# Patient Record
Sex: FEMALE | Race: WHITE | Employment: FULL TIME | URBAN - METROPOLITAN AREA
[De-identification: names, ages, dates, MRNs, and addresses within clinical notes are randomized per-mention and may not be internally consistent; named-entity substitution may affect disease eponyms.]

---

## 2017-11-15 ENCOUNTER — HOSPITAL ENCOUNTER (OUTPATIENT)
Facility: HOSPITAL | Age: 42
Setting detail: OBSERVATION
Discharge: HOME | End: 2017-11-15
Attending: OBSTETRICS & GYNECOLOGY | Admitting: OBSTETRICS & GYNECOLOGY
Payer: COMMERCIAL

## 2017-11-15 VITALS
TEMPERATURE: 98 F | RESPIRATION RATE: 16 BRPM | WEIGHT: 178 LBS | HEIGHT: 63 IN | HEART RATE: 90 BPM | BODY MASS INDEX: 31.54 KG/M2 | DIASTOLIC BLOOD PRESSURE: 71 MMHG | SYSTOLIC BLOOD PRESSURE: 127 MMHG

## 2017-11-15 PROBLEM — Z34.90 PREGNANCY: Status: ACTIVE | Noted: 2017-11-15

## 2017-11-15 PROBLEM — Z34.90 PREGNANCY (HCC): Status: ACTIVE | Noted: 2017-11-15

## 2017-11-15 PROCEDURE — 81002 URINALYSIS NONAUTO W/O SCOPE: CPT
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
## 2018-01-15 LAB
HIV RESULT OB: NEGATIVE
STREP GP B CULT OB: POSITIVE

## 2018-01-21 ENCOUNTER — HOSPITAL ENCOUNTER (INPATIENT)
Facility: HOSPITAL | Age: 43
LOS: 4 days | Discharge: HOME | End: 2018-01-25
Attending: OBSTETRICS & GYNECOLOGY | Admitting: OBSTETRICS & GYNECOLOGY
Payer: COMMERCIAL

## 2018-01-21 LAB
BILIRUBIN URINE: NEGATIVE
BLOOD URINE: NEGATIVE
CONTROL RUN WITHIN 24 HOURS?: YES
GLUCOSE URINE: NEGATIVE
KETONE URINE: NEGATIVE
LEUKOCYTE ESTERASE URINE: NEGATIVE
NITRITE URINE: NEGATIVE
PH URINE: 7 (ref 5–8)
PROTEIN URINE: NEGATIVE
SPEC GRAVITY: 1.01 (ref 1–1.03)
URINE CLARITY: CLEAR
URINE COLOR: YELLOW
UROBILINOGEN URINE: 0.2

## 2018-01-21 PROCEDURE — 85025 COMPLETE CBC W/AUTO DIFF WBC: CPT | Performed by: OBSTETRICS & GYNECOLOGY

## 2018-01-21 PROCEDURE — 81002 URINALYSIS NONAUTO W/O SCOPE: CPT

## 2018-01-21 PROCEDURE — 86780 TREPONEMA PALLIDUM: CPT | Performed by: OBSTETRICS & GYNECOLOGY

## 2018-01-21 RX ORDER — HYDROMORPHONE HYDROCHLORIDE 1 MG/ML
2 INJECTION, SOLUTION INTRAMUSCULAR; INTRAVENOUS; SUBCUTANEOUS ONCE
Status: COMPLETED | OUTPATIENT
Start: 2018-01-21 | End: 2018-01-21

## 2018-01-21 RX ORDER — SODIUM CHLORIDE, SODIUM LACTATE, POTASSIUM CHLORIDE, CALCIUM CHLORIDE 600; 310; 30; 20 MG/100ML; MG/100ML; MG/100ML; MG/100ML
INJECTION, SOLUTION INTRAVENOUS CONTINUOUS
Status: DISCONTINUED | OUTPATIENT
Start: 2018-01-21 | End: 2018-01-22

## 2018-01-22 ENCOUNTER — ANESTHESIA (OUTPATIENT)
Dept: OBGYN UNIT | Facility: HOSPITAL | Age: 43
End: 2018-01-22
Payer: COMMERCIAL

## 2018-01-22 ENCOUNTER — ANESTHESIA EVENT (OUTPATIENT)
Dept: OBGYN UNIT | Facility: HOSPITAL | Age: 43
End: 2018-01-22
Payer: COMMERCIAL

## 2018-01-22 ENCOUNTER — SURGERY (OUTPATIENT)
Age: 43
End: 2018-01-22

## 2018-01-22 PROBLEM — Z98.891 PREVIOUS CESAREAN SECTION: Status: ACTIVE | Noted: 2018-01-22

## 2018-01-22 LAB
ANTIBODY SCREEN: NEGATIVE
BASOPHILS # BLD AUTO: 0.06 X10(3) UL (ref 0–0.1)
BASOPHILS NFR BLD AUTO: 0.4 %
EOSINOPHIL # BLD AUTO: 0.26 X10(3) UL (ref 0–0.3)
EOSINOPHIL NFR BLD AUTO: 1.7 %
ERYTHROCYTE [DISTWIDTH] IN BLOOD BY AUTOMATED COUNT: 13 % (ref 11.5–16)
HCT VFR BLD AUTO: 39.2 % (ref 34–50)
HGB BLD-MCNC: 12.9 G/DL (ref 12–16)
IMMATURE GRANULOCYTE COUNT: 0.12 X10(3) UL (ref 0–1)
IMMATURE GRANULOCYTE RATIO %: 0.8 %
LYMPHOCYTES # BLD AUTO: 1.93 X10(3) UL (ref 0.9–4)
LYMPHOCYTES NFR BLD AUTO: 12.6 %
MCH RBC QN AUTO: 30.3 PG (ref 27–33.2)
MCHC RBC AUTO-ENTMCNC: 32.9 G/DL (ref 31–37)
MCV RBC AUTO: 92 FL (ref 81–100)
MONOCYTES # BLD AUTO: 0.97 X10(3) UL (ref 0.1–0.6)
MONOCYTES NFR BLD AUTO: 6.4 %
NEUTROPHIL ABS PRELIM: 11.92 X10 (3) UL (ref 1.3–6.7)
NEUTROPHILS # BLD AUTO: 11.92 X10(3) UL (ref 1.3–6.7)
NEUTROPHILS NFR BLD AUTO: 78.1 %
PLATELET # BLD AUTO: 220 10(3)UL (ref 150–450)
RBC # BLD AUTO: 4.26 X10(6)UL (ref 3.8–5.1)
RED CELL DISTRIBUTION WIDTH-SD: 42.4 FL (ref 35.1–46.3)
RH BLOOD TYPE: POSITIVE
T PALLIDUM AB SER QL IA: NONREACTIVE
WBC # BLD AUTO: 15.3 X10(3) UL (ref 4–13)

## 2018-01-22 PROCEDURE — 88302 TISSUE EXAM BY PATHOLOGIST: CPT | Performed by: OBSTETRICS & GYNECOLOGY

## 2018-01-22 PROCEDURE — 86900 BLOOD TYPING SEROLOGIC ABO: CPT | Performed by: OBSTETRICS & GYNECOLOGY

## 2018-01-22 PROCEDURE — 86901 BLOOD TYPING SEROLOGIC RH(D): CPT | Performed by: OBSTETRICS & GYNECOLOGY

## 2018-01-22 PROCEDURE — 86850 RBC ANTIBODY SCREEN: CPT | Performed by: OBSTETRICS & GYNECOLOGY

## 2018-01-22 PROCEDURE — 0UB70ZZ EXCISION OF BILATERAL FALLOPIAN TUBES, OPEN APPROACH: ICD-10-PCS | Performed by: OBSTETRICS & GYNECOLOGY

## 2018-01-22 RX ORDER — NALBUPHINE HCL 10 MG/ML
2.5 AMPUL (ML) INJECTION EVERY 4 HOURS PRN
Status: DISCONTINUED | OUTPATIENT
Start: 2018-01-22 | End: 2018-01-25

## 2018-01-22 RX ORDER — NALBUPHINE HCL 10 MG/ML
2.5 AMPUL (ML) INJECTION
Status: DISCONTINUED | OUTPATIENT
Start: 2018-01-22 | End: 2018-01-22 | Stop reason: HOSPADM

## 2018-01-22 RX ORDER — ONDANSETRON 2 MG/ML
4 INJECTION INTRAMUSCULAR; INTRAVENOUS ONCE AS NEEDED
Status: DISCONTINUED | OUTPATIENT
Start: 2018-01-22 | End: 2018-01-22 | Stop reason: HOSPADM

## 2018-01-22 RX ORDER — HYDROCODONE BITARTRATE AND ACETAMINOPHEN 5; 325 MG/1; MG/1
1 TABLET ORAL EVERY 4 HOURS PRN
Status: DISCONTINUED | OUTPATIENT
Start: 2018-01-22 | End: 2018-01-25

## 2018-01-22 RX ORDER — SIMETHICONE 80 MG
80 TABLET,CHEWABLE ORAL 3 TIMES DAILY PRN
Status: DISCONTINUED | OUTPATIENT
Start: 2018-01-22 | End: 2018-01-25

## 2018-01-22 RX ORDER — NALOXONE HYDROCHLORIDE 0.4 MG/ML
0.08 INJECTION, SOLUTION INTRAMUSCULAR; INTRAVENOUS; SUBCUTANEOUS
Status: ACTIVE | OUTPATIENT
Start: 2018-01-22 | End: 2018-01-23

## 2018-01-22 RX ORDER — DIPHENHYDRAMINE HYDROCHLORIDE 50 MG/ML
25 INJECTION INTRAMUSCULAR; INTRAVENOUS ONCE AS NEEDED
Status: DISCONTINUED | OUTPATIENT
Start: 2018-01-22 | End: 2018-01-22 | Stop reason: HOSPADM

## 2018-01-22 RX ORDER — HYDROCODONE BITARTRATE AND ACETAMINOPHEN 10; 325 MG/1; MG/1
1 TABLET ORAL EVERY 4 HOURS PRN
Status: DISCONTINUED | OUTPATIENT
Start: 2018-01-22 | End: 2018-01-25

## 2018-01-22 RX ORDER — KETOROLAC TROMETHAMINE 30 MG/ML
INJECTION, SOLUTION INTRAMUSCULAR; INTRAVENOUS
Status: COMPLETED
Start: 2018-01-22 | End: 2018-01-22

## 2018-01-22 RX ORDER — SODIUM CHLORIDE, SODIUM LACTATE, POTASSIUM CHLORIDE, CALCIUM CHLORIDE 600; 310; 30; 20 MG/100ML; MG/100ML; MG/100ML; MG/100ML
INJECTION, SOLUTION INTRAVENOUS CONTINUOUS
Status: DISCONTINUED | OUTPATIENT
Start: 2018-01-22 | End: 2018-01-22

## 2018-01-22 RX ORDER — METOCLOPRAMIDE HYDROCHLORIDE 5 MG/ML
10 INJECTION INTRAMUSCULAR; INTRAVENOUS EVERY 6 HOURS PRN
Status: DISCONTINUED | OUTPATIENT
Start: 2018-01-22 | End: 2018-01-25

## 2018-01-22 RX ORDER — SODIUM CHLORIDE, SODIUM LACTATE, POTASSIUM CHLORIDE, CALCIUM CHLORIDE 600; 310; 30; 20 MG/100ML; MG/100ML; MG/100ML; MG/100ML
INJECTION, SOLUTION INTRAVENOUS CONTINUOUS
Status: DISCONTINUED | OUTPATIENT
Start: 2018-01-22 | End: 2018-01-25

## 2018-01-22 RX ORDER — ONDANSETRON 2 MG/ML
4 INJECTION INTRAMUSCULAR; INTRAVENOUS EVERY 6 HOURS PRN
Status: DISCONTINUED | OUTPATIENT
Start: 2018-01-22 | End: 2018-01-25

## 2018-01-22 RX ORDER — CEFAZOLIN SODIUM/WATER 2 G/20 ML
2 SYRINGE (ML) INTRAVENOUS
Status: DISCONTINUED | OUTPATIENT
Start: 2018-01-22 | End: 2018-01-22

## 2018-01-22 RX ORDER — ZOLPIDEM TARTRATE 5 MG/1
5 TABLET ORAL NIGHTLY PRN
Status: DISCONTINUED | OUTPATIENT
Start: 2018-01-22 | End: 2018-01-25

## 2018-01-22 RX ORDER — DIPHENHYDRAMINE HYDROCHLORIDE 50 MG/ML
12.5 INJECTION INTRAMUSCULAR; INTRAVENOUS EVERY 4 HOURS PRN
Status: DISCONTINUED | OUTPATIENT
Start: 2018-01-22 | End: 2018-01-25

## 2018-01-22 RX ORDER — BISACODYL 10 MG
10 SUPPOSITORY, RECTAL RECTAL
Status: DISCONTINUED | OUTPATIENT
Start: 2018-01-22 | End: 2018-01-25

## 2018-01-22 RX ORDER — KETOROLAC TROMETHAMINE 30 MG/ML
30 INJECTION, SOLUTION INTRAMUSCULAR; INTRAVENOUS ONCE AS NEEDED
Status: COMPLETED | OUTPATIENT
Start: 2018-01-22 | End: 2018-01-22

## 2018-01-22 RX ORDER — IBUPROFEN 600 MG/1
600 TABLET ORAL EVERY 6 HOURS SCHEDULED
Status: DISCONTINUED | OUTPATIENT
Start: 2018-01-23 | End: 2018-01-25

## 2018-01-22 RX ORDER — DEXTROSE, SODIUM CHLORIDE, SODIUM LACTATE, POTASSIUM CHLORIDE, AND CALCIUM CHLORIDE 5; .6; .31; .03; .02 G/100ML; G/100ML; G/100ML; G/100ML; G/100ML
INJECTION, SOLUTION INTRAVENOUS CONTINUOUS
Status: DISCONTINUED | OUTPATIENT
Start: 2018-01-22 | End: 2018-01-25

## 2018-01-22 RX ORDER — DOCUSATE SODIUM 100 MG/1
100 CAPSULE, LIQUID FILLED ORAL
Status: DISCONTINUED | OUTPATIENT
Start: 2018-01-23 | End: 2018-01-25

## 2018-01-22 RX ORDER — DIPHENHYDRAMINE HCL 25 MG
25 CAPSULE ORAL EVERY 4 HOURS PRN
Status: DISCONTINUED | OUTPATIENT
Start: 2018-01-22 | End: 2018-01-25

## 2018-01-22 RX ORDER — TRISODIUM CITRATE DIHYDRATE AND CITRIC ACID MONOHYDRATE 500; 334 MG/5ML; MG/5ML
30 SOLUTION ORAL ONCE
Status: COMPLETED | OUTPATIENT
Start: 2018-01-22 | End: 2018-01-22

## 2018-01-22 RX ORDER — HYDROMORPHONE HYDROCHLORIDE 1 MG/ML
0.4 INJECTION, SOLUTION INTRAMUSCULAR; INTRAVENOUS; SUBCUTANEOUS EVERY 2 HOUR PRN
Status: ACTIVE | OUTPATIENT
Start: 2018-01-22 | End: 2018-01-23

## 2018-01-22 RX ORDER — KETOROLAC TROMETHAMINE 30 MG/ML
30 INJECTION, SOLUTION INTRAMUSCULAR; INTRAVENOUS EVERY 6 HOURS PRN
Status: DISPENSED | OUTPATIENT
Start: 2018-01-22 | End: 2018-01-23

## 2018-01-22 NOTE — PROGRESS NOTES
Patient in stable condition. Discharge instructions given. ID bands veirified. Hugs and Kisses tags remain in place. Instructional sheets at bedside, reviewed and signed. Infant assessment complete. Infant remains with parents.

## 2018-01-22 NOTE — PROGRESS NOTES
Patient transferred to mother/baby room 2218 per cart in stable condition with baby and personal belongings. Accompanied by  and staff. Report given to mother/baby RN.

## 2018-01-22 NOTE — PLAN OF CARE
Problem: Patient/Family Goals  Goal: Patient/Family Long Term Goal  Patient's Long Term Goal: Safe,  delivery or antepartum discharge home    Interventions:  -    - See additional Care Plan goals for specific interventions    Outcome: Completed Terry

## 2018-01-22 NOTE — PROGRESS NOTES
Pt presents as  w/ edc of 18 c/o LLQ pain all day and ctx q 5 minute since 1800. Pt ambulatory and admitted to triage room 5 accompanied by mother. EFM tested and applied, FHTs tracing and audible in 120s. Pt denies SROM, vaginal bleeding.

## 2018-01-22 NOTE — ANESTHESIA PREPROCEDURE EVALUATION
PRE-OP EVALUATION    Patient Name: Darrin Culver    Pre-op Diagnosis: * No pre-op diagnosis entered *    Procedure(s):        Surgeon(s) and Role:     * Rhianna Foster MD - Primary    Pre-op vitals reviewed.   Temp: 99 °F (37.2 °C)  Pulse: 64  Resp: 18  BP: Airway    Airway assessment appropriate for age. Cardiovascular    Cardiovascular exam normal.  Rhythm: regular  Rate: normal     Dental    No notable dental history.          Pulmonary    Pulmonary exam normal.  Breath sounds clear to ausc

## 2018-01-22 NOTE — OPERATIVE REPORT
[facility]   Section - Operative Note    Michelle Furnish Patient Status:  Inpatient    1975 MRN [id number]   Location [address] [facility] Attending Genna Morrissey MD   Hosp Day # 1 PCP Hamzah Sharma MD   Preoperative Diagno cord.  Uterus, tubes and ovaries were normal in appearance. The uterine cavity was swept clean using a wet lap. The  hysterotomy was closed using 0 Vicryl. The left fallopian tube was identified and traced to its fimbriated end.  It was grasped with a ba

## 2018-01-23 LAB
BASOPHILS # BLD AUTO: 0.04 X10(3) UL (ref 0–0.1)
BASOPHILS NFR BLD AUTO: 0.3 %
EOSINOPHIL # BLD AUTO: 0.09 X10(3) UL (ref 0–0.3)
EOSINOPHIL NFR BLD AUTO: 0.6 %
ERYTHROCYTE [DISTWIDTH] IN BLOOD BY AUTOMATED COUNT: 12.9 % (ref 11.5–16)
HCT VFR BLD AUTO: 29.6 % (ref 34–50)
HGB BLD-MCNC: 10 G/DL (ref 12–16)
IMMATURE GRANULOCYTE COUNT: 0.08 X10(3) UL (ref 0–1)
IMMATURE GRANULOCYTE RATIO %: 0.6 %
LYMPHOCYTES # BLD AUTO: 1.54 X10(3) UL (ref 0.9–4)
LYMPHOCYTES NFR BLD AUTO: 10.8 %
MCH RBC QN AUTO: 30 PG (ref 27–33.2)
MCHC RBC AUTO-ENTMCNC: 33.8 G/DL (ref 31–37)
MCV RBC AUTO: 88.9 FL (ref 81–100)
MONOCYTES # BLD AUTO: 1.11 X10(3) UL (ref 0.1–0.6)
MONOCYTES NFR BLD AUTO: 7.8 %
NEUTROPHIL ABS PRELIM: 11.34 X10 (3) UL (ref 1.3–6.7)
NEUTROPHILS # BLD AUTO: 11.34 X10(3) UL (ref 1.3–6.7)
NEUTROPHILS NFR BLD AUTO: 79.9 %
PLATELET # BLD AUTO: 174 10(3)UL (ref 150–450)
RBC # BLD AUTO: 3.33 X10(6)UL (ref 3.8–5.1)
RED CELL DISTRIBUTION WIDTH-SD: 41.9 FL (ref 35.1–46.3)
WBC # BLD AUTO: 14.2 X10(3) UL (ref 4–13)

## 2018-01-23 PROCEDURE — 85025 COMPLETE CBC W/AUTO DIFF WBC: CPT | Performed by: OBSTETRICS & GYNECOLOGY

## 2018-01-23 NOTE — PROGRESS NOTES
[facility]    Patients Name: Leigha James  Attending Physician: Gail Pozo MD  CSN: [id number]    Location:  80/46-A  MRN: [id number]    YOB: 1975  Admission Date: 1/21/2018     Obstetric Anesthesia Pain Progress Note    Post-Op

## 2018-01-23 NOTE — PROGRESS NOTES
[facility]  Post-Partum Caesarean Section Progress Note    Spencer Jacobs Patient Status:  Inpatient    1975 MRN [id number]   [facility] 2SW-J Attending Ronda Yang MD   Hosp Day # 2 PCP Juve Ferrara MD     SUBJECTIVE:    MFL

## 2018-01-24 NOTE — PROGRESS NOTES
[facility]  Post-Partum Caesarean Section Progress Note    Melissa Austin Patient Status:  Inpatient    1975 MRN [id number]   [facility] 2SW-J Attending Balbir Nielsen MD   Hosp Day # 3 PCP Giovanna Avelar MD     SUBJECTIVE:    LEISAYX

## 2018-01-25 VITALS
TEMPERATURE: 98 F | DIASTOLIC BLOOD PRESSURE: 84 MMHG | WEIGHT: 180 LBS | OXYGEN SATURATION: 94 % | BODY MASS INDEX: 31.89 KG/M2 | SYSTOLIC BLOOD PRESSURE: 125 MMHG | HEIGHT: 63 IN | HEART RATE: 62 BPM | RESPIRATION RATE: 20 BRPM

## 2018-01-25 PROBLEM — Z98.891 PREVIOUS CESAREAN SECTION: Status: RESOLVED | Noted: 2018-01-22 | Resolved: 2018-01-25

## 2018-01-25 PROBLEM — Z34.90 PREGNANCY (HCC): Status: RESOLVED | Noted: 2017-11-15 | Resolved: 2018-01-25

## 2018-01-25 PROBLEM — Z34.90 PREGNANCY: Status: RESOLVED | Noted: 2017-11-15 | Resolved: 2018-01-25

## 2018-01-25 RX ORDER — HYDROCODONE BITARTRATE AND ACETAMINOPHEN 10; 325 MG/1; MG/1
1 TABLET ORAL EVERY 4 HOURS PRN
Qty: 30 TABLET | Refills: 0 | Status: SHIPPED | OUTPATIENT
Start: 2018-01-25 | End: 2019-06-12 | Stop reason: ALTCHOICE

## 2018-01-25 NOTE — PLAN OF CARE
PAIN - ADULT    • Verbalizes/displays adequate comfort level or patient's stated pain goal Completed        POSTPARTUM    • Long Term Goal:Experiences normal postpartum course Completed    • Optimize infant feeding at the breast Completed    • Appropriate

## 2018-01-25 NOTE — PROGRESS NOTES
Discharge to home with all belongings and baby in [address]. Hugs and Kisses removed, ID bands verified and signed for. Accompanied by staff to car, no questions regarding discharge instructions.

## 2018-01-25 NOTE — DISCHARGE SUMMARY
[facility]  Discharge Summary    Sole Tomlin Patient Status:  Inpatient    1975 MRN [id number]   [facility] 2SW-J Attending Ananth Young MD   [facility] Day # 4 PCP Alycia Zurita MD         [facility]: Estimated Date of Delivery: 18

## 2018-01-25 NOTE — PROGRESS NOTES
[facility]  Post-Partum Caesarean Section Progress Note    Gibran Parra Patient Status:  Inpatient    1975 MRN [id number]   [facility] 2SW-J Attending John Mitchell MD   Hosp Day # 4 PCP Pan Jackson MD     SUBJECTIVE:    QEL

## 2018-01-30 ENCOUNTER — TELEPHONE (OUTPATIENT)
Dept: OBGYN UNIT | Facility: HOSPITAL | Age: 43
End: 2018-01-30

## 2018-01-30 NOTE — ANESTHESIA POSTPROCEDURE EVALUATION
[address] Patient Status:  Inpatient   Age/Gender 38year old female MRN [id number]   [facility] 2SW-J Attending No att. providers found   [address] Day # 4 PCP Audi Medina MD       Anesthesia Post-op Note    Procedure(s):

## 2018-12-08 ENCOUNTER — OFFICE VISIT (OUTPATIENT)
Dept: FAMILY MEDICINE CLINIC | Facility: CLINIC | Age: 43
End: 2018-12-08

## 2018-12-08 VITALS
HEIGHT: 63 IN | WEIGHT: 167.81 LBS | TEMPERATURE: 98 F | OXYGEN SATURATION: 97 % | SYSTOLIC BLOOD PRESSURE: 130 MMHG | RESPIRATION RATE: 16 BRPM | BODY MASS INDEX: 29.73 KG/M2 | DIASTOLIC BLOOD PRESSURE: 76 MMHG | HEART RATE: 71 BPM

## 2018-12-08 DIAGNOSIS — J02.9 SORE THROAT: Primary | ICD-10-CM

## 2018-12-08 DIAGNOSIS — J02.9 ACUTE VIRAL PHARYNGITIS: ICD-10-CM

## 2018-12-08 PROCEDURE — 87081 CULTURE SCREEN ONLY: CPT | Performed by: NURSE PRACTITIONER

## 2018-12-08 PROCEDURE — 87880 STREP A ASSAY W/OPTIC: CPT | Performed by: NURSE PRACTITIONER

## 2018-12-08 PROCEDURE — 99203 OFFICE O/P NEW LOW 30 MIN: CPT | Performed by: NURSE PRACTITIONER

## 2018-12-08 NOTE — PATIENT INSTRUCTIONS
Rapid strep negative. Will send culture and call with results. Viral Pharyngitis (Sore Throat)    You or your child have pharyngitis (sore throat). This infection is caused by a virus.  It can cause throat pain that is worse when swallowing, aching al Follow up with a healthcare provider or our staff if you or your child are not getting better over the next week.   When to seek medical advice  Call your healthcare provider right away if any of these occur:  · Fever as directed by your healthcare provider · Suck on throat lozenges, cough drops, hard candy, ice chips, or frozen fruit-juice bars. Use the sugar-free versions if your diet or medical condition requires them. Gargle to ease irritation  Gargling every hour or 2 can ease irritation.  Try gargling w

## 2018-12-08 NOTE — PROGRESS NOTES
CHIEF COMPLAINT:   Patient presents with:  Sore Throat: x over 1 week  Ear Pain: R ear x 1 day  Nasal Congestion: x 1 week  Cough: x 1 week        HPI:   Donny Valdovinos is a 41year old female presents to clinic with complaint of sore throat.  Patient has had SKIN: no rashes,no suspicious lesions  HEAD: atraumatic, normocephalic  EYES: conjunctiva clear, EOM intact  EARS: TM's clear, non-injected, no bulging, retraction, or fluid bilaterally  NOSE: nostrils patent, clear nasal discharge, nasal mucosa pink  THRO You or your child have pharyngitis (sore throat). This infection is caused by a virus. It can cause throat pain that is worse when swallowing, aching all over, headache, and fever.  The infection may be spread by coughing, kissing, or touching others after Call your healthcare provider right away if any of these occur:  · Fever as directed by your healthcare provider.  For children, seek care if:  ? Your child is of any age and has repeated fevers above 104°F (40°C). ?  Your child is younger than 2 years of Gargling every hour or 2 can ease irritation.  Try gargling with 1 of these solutions:  · 1/4 teaspoon of salt in 1/2 cup of warm water  · An over-the-counter anesthetic gargle  Use medicine for more relief  Over-the-counter medicine can reduce sore throat

## 2019-06-12 ENCOUNTER — OFFICE VISIT (OUTPATIENT)
Dept: FAMILY MEDICINE CLINIC | Facility: CLINIC | Age: 44
End: 2019-06-12

## 2019-06-12 ENCOUNTER — LAB ENCOUNTER (OUTPATIENT)
Dept: LAB | Age: 44
End: 2019-06-12
Attending: FAMILY MEDICINE
Payer: COMMERCIAL

## 2019-06-12 VITALS
HEIGHT: 63 IN | SYSTOLIC BLOOD PRESSURE: 110 MMHG | BODY MASS INDEX: 29.06 KG/M2 | WEIGHT: 164 LBS | TEMPERATURE: 97 F | DIASTOLIC BLOOD PRESSURE: 70 MMHG | RESPIRATION RATE: 16 BRPM | HEART RATE: 70 BPM

## 2019-06-12 DIAGNOSIS — Z00.00 WELLNESS EXAMINATION: Primary | ICD-10-CM

## 2019-06-12 DIAGNOSIS — M19.90 ARTHRITIS: ICD-10-CM

## 2019-06-12 DIAGNOSIS — Z00.00 LABORATORY EXAM ORDERED AS PART OF ROUTINE GENERAL MEDICAL EXAMINATION: ICD-10-CM

## 2019-06-12 DIAGNOSIS — Z12.31 ENCOUNTER FOR SCREENING MAMMOGRAM FOR BREAST CANCER: ICD-10-CM

## 2019-06-12 PROCEDURE — 99386 PREV VISIT NEW AGE 40-64: CPT | Performed by: FAMILY MEDICINE

## 2019-06-12 PROCEDURE — 84443 ASSAY THYROID STIM HORMONE: CPT

## 2019-06-12 PROCEDURE — 85025 COMPLETE CBC W/AUTO DIFF WBC: CPT

## 2019-06-12 PROCEDURE — 80061 LIPID PANEL: CPT

## 2019-06-12 PROCEDURE — 36415 COLL VENOUS BLD VENIPUNCTURE: CPT

## 2019-06-12 PROCEDURE — 99203 OFFICE O/P NEW LOW 30 MIN: CPT | Performed by: FAMILY MEDICINE

## 2019-06-12 PROCEDURE — 80053 COMPREHEN METABOLIC PANEL: CPT

## 2019-06-12 NOTE — PATIENT INSTRUCTIONS
Thank you for choosing Levon Clements MD at [place] [address]  To Do: Jamison Cary  1. Please see age appropriate health prevention below    [employer] is located in [address]. Monday, Tuesday & Friday – 8 a.m. to 4 p.m.   Wednesday, Thursday – benefits outweigh those potential risks and we strive to make you healthier and to improve your quality of life.     Referrals, and Radiology Information:    If your insurance requires a referral to a specialist, please allow 5 business days to process your age 39 and women without symptoms at any age who are overweight or obese and have 1 or more additional risk factors for diabetes At least every 3 years1   Type 2 diabetes or prediabetes All women diagnosed with gestational diabetes Lifelong testing every 3 women in this age group Complete exam at age 36 and eye exams every 2 to 4 years. If you have a chronic disease, ask your healthcare provider how often you should have your eyes examined. 4   Vaccine Who needs it How often   Chickenpox (varicella) All women children At routine exams   Sexually transmitted infection prevention Women at increased risk for infection–talk with your healthcare provider At routine exams   Use of tobacco and the health effects it can cause All women in this age group Every exam   1A

## 2019-06-12 NOTE — H&P
Wellness Exam    REASON FOR VISIT:    Yasmin Colmenares is a 41year old female who presents for an [address]     Current Complaints: Ms. Debora Resendez is a pleasant 40 y/o F here for her wellness exam  Flu shot: see immunization record  Health Maintenance Recommendation Internal Lab or Procedure External Lab or Procedure   Breast Cancer Screening   Every 2 yrs age 54-69 Mammogram due on 11/22/2017    Pap Every 3 yrs age 21-65 or Pap and HPV every 5 yrs age 33-67 Pap Smear,2 Years due on 08/01/2017    Chlamy  SECTION N/A 2018    Performed by Nallely Lyon MD at [facility] L[facility]D OR   • EXTRACTION ERUPTED TOOTH/EXR      wisdom teeth   • PPTL Bilateral 2018    Performed by Nallely Lyon MD at [facility] L+D OR      Family History   Problem Relation Age of Ons atraumatic. Nose: Nose normal.   Eyes: EOM are normal. Pupils are equal, round, and reactive to light. No scleral icterus. Neck: Normal range of motion. No thyromegaly present. Cardiovascular: Normal rate, regular rhythm and normal heart sounds.   Exa COMP METABOLIC PANEL (14); Future  -     LIPID PANEL;  Future  -     TSH W REFLEX TO FREE T4; Future    Arthritis    -Bilateral hand pain likely secondary to arthritis; with the nature of her occupation, she may be prone to having this; the rest hands as mu

## 2019-09-23 ENCOUNTER — HOSPITAL ENCOUNTER (OUTPATIENT)
Dept: MAMMOGRAPHY | Age: 44
Discharge: HOME | End: 2019-09-23
Attending: FAMILY MEDICINE
Payer: COMMERCIAL

## 2019-09-23 ENCOUNTER — HOSPITAL ENCOUNTER (OUTPATIENT)
Dept: MAMMOGRAPHY | Age: 44
End: 2019-09-23
Attending: FAMILY MEDICINE
Payer: COMMERCIAL

## 2019-09-23 DIAGNOSIS — Z12.31 ENCOUNTER FOR SCREENING MAMMOGRAM FOR BREAST CANCER: ICD-10-CM

## 2019-09-23 PROCEDURE — 77067 SCR MAMMO BI INCL CAD: CPT | Performed by: FAMILY MEDICINE

## 2019-09-23 PROCEDURE — 77063 BREAST TOMOSYNTHESIS BI: CPT | Performed by: FAMILY MEDICINE

## 2020-08-19 ENCOUNTER — OFFICE VISIT (OUTPATIENT)
Dept: FAMILY MEDICINE CLINIC | Facility: CLINIC | Age: 45
End: 2020-08-19

## 2020-08-19 VITALS
HEIGHT: 63 IN | BODY MASS INDEX: 28.53 KG/M2 | WEIGHT: 161 LBS | HEART RATE: 90 BPM | DIASTOLIC BLOOD PRESSURE: 80 MMHG | TEMPERATURE: 99 F | OXYGEN SATURATION: 98 % | RESPIRATION RATE: 16 BRPM | SYSTOLIC BLOOD PRESSURE: 120 MMHG

## 2020-08-19 DIAGNOSIS — Z20.2 POSSIBLE EXPOSURE TO STD: ICD-10-CM

## 2020-08-19 DIAGNOSIS — Z00.00 WELLNESS EXAMINATION: Primary | ICD-10-CM

## 2020-08-19 DIAGNOSIS — Z00.00 LABORATORY EXAM ORDERED AS PART OF ROUTINE GENERAL MEDICAL EXAMINATION: ICD-10-CM

## 2020-08-19 DIAGNOSIS — Z12.31 SCREENING MAMMOGRAM, ENCOUNTER FOR: ICD-10-CM

## 2020-08-19 PROCEDURE — 99396 PREV VISIT EST AGE 40-64: CPT | Performed by: FAMILY MEDICINE

## 2020-08-19 PROCEDURE — G0438 PPPS, INITIAL VISIT: HCPCS | Performed by: FAMILY MEDICINE

## 2020-08-19 PROCEDURE — 3008F BODY MASS INDEX DOCD: CPT | Performed by: FAMILY MEDICINE

## 2020-08-19 PROCEDURE — 3079F DIAST BP 80-89 MM HG: CPT | Performed by: FAMILY MEDICINE

## 2020-08-19 PROCEDURE — 3074F SYST BP LT 130 MM HG: CPT | Performed by: FAMILY MEDICINE

## 2020-08-19 NOTE — PROGRESS NOTES
Wellness Exam    REASON FOR VISIT:    Angel Raines is a 45year old female who presents for an [address]     Current Complaints: Ms. German Kruger is here for her  Wellness exam  Flu shot: see immunization record  Health Maintenance Topics with due sta all    PHQ-2 SCORE: 0              PREVENTATIVE SERVICES  INDICATIONS AND SCHEDULE Recommendation Internal Lab or Procedure External Lab or Procedure   Breast Cancer Screening   Every 2 yrs age 54-69 Mammogram due on 09/23/2020    Pap Every 3 yrs age 21-65 INFORMATION:   Past Medical History:   Diagnosis Date   • Fibroadenoma of left breast 2015   • Varicose vein 2015      Past Surgical History:   Procedure Laterality Date   •      •  SECTION N/A 2018    Performed by Hayes Willoughby, Resp 16   Ht 63\"   Wt 161 lb (73 kg)   LMP 08/05/2020   SpO2 98%   BMI 28.52 kg/m²    Patient's last menstrual period was 08/05/2020. Constitutional: She appears her stated age, nourished, and pleasant.  Vital signs reviewed as noted  Head: Normocephalic T4; Future    Screening mammogram, encounter for  -     JIM JACKELYN 2D+3D SCREENING [facility] CHIQUITA([id number]); Future    Possible exposure to STD  -     JIM JACKELYN 2D+3D SCREENING [facility] ROSANNA([id number]); Future  -     T PALLIDUM SCREENING CASCADE;  Future  - after 1956 and not immune     Patient Active Problem List:     Fibroadenoma of left breast     Varicose vein     Arthritis    PREVENTIVE VISIT,EST,40-64

## 2020-08-19 NOTE — PATIENT INSTRUCTIONS
Thank you for choosing Audi Medina MD at [place] [address]  To Do: Carmen Diana  1. Please see age appropriate health prevention below    [employer] is located in [address]. Monday, Tuesday & Friday – 8 a.m. to 4 p.m.   Wednesday, Thursday – benefits outweigh those potential risks and we strive to make you healthier and to improve your quality of life.     Referrals, and Radiology Information:    If your insurance requires a referral to a specialist, please allow 5 business days to process your who are overweight or obese and have 1 or more additional risk factors for diabetes At least every 3 years1   Type 2 diabetes or prediabetes All women diagnosed with gestational diabetes Lifelong testing every 3 years   Type 2 diabetes All women with predi age group At routine exams   Gonorrhea Sexually active women at increased risk for infection At routine exams   Hepatitis C Anyone at increased risk; 1 time for those [facility] At routine exams   High cholesterol or triglycerides All women Pneumococcal conjugate vaccine (PCV13) and pneumococcal polysaccharide vaccine (PPSV23) Women at increased risk for infection–talk with your healthcare provider 1 or 2 doses   Tetanus/diphtheria/pertussis (Td/Tdap) booster All women in this age group A 1

## 2020-09-18 ENCOUNTER — LAB ENCOUNTER (OUTPATIENT)
Dept: LAB | Age: 45
End: 2020-09-18
Attending: FAMILY MEDICINE
Payer: COMMERCIAL

## 2020-09-18 DIAGNOSIS — Z00.00 LABORATORY EXAM ORDERED AS PART OF ROUTINE GENERAL MEDICAL EXAMINATION: ICD-10-CM

## 2020-09-18 DIAGNOSIS — Z20.2 POSSIBLE EXPOSURE TO STD: Primary | ICD-10-CM

## 2020-09-18 LAB
ALBUMIN SERPL-MCNC: 4.1 G/DL (ref 3.4–5)
ALBUMIN/GLOB SERPL: 1.1 {RATIO} (ref 1–2)
ALP LIVER SERPL-CCNC: 73 U/L (ref 37–98)
ALT SERPL-CCNC: 26 U/L (ref 13–56)
ANION GAP SERPL CALC-SCNC: 5 MMOL/L (ref 0–18)
AST SERPL-CCNC: 13 U/L (ref 15–37)
BASOPHILS # BLD AUTO: 0.07 X10(3) UL (ref 0–0.2)
BASOPHILS NFR BLD AUTO: 0.6 %
BILIRUB SERPL-MCNC: 0.6 MG/DL (ref 0.1–2)
BUN BLD-MCNC: 8 MG/DL (ref 7–18)
BUN/CREAT SERPL: 7.5 (ref 10–20)
C TRACH DNA SPEC QL NAA+PROBE: NEGATIVE
CALCIUM BLD-MCNC: 10 MG/DL (ref 8.5–10.1)
CHLORIDE SERPL-SCNC: 106 MMOL/L (ref 98–112)
CHOLEST SMN-MCNC: 182 MG/DL (ref ?–200)
CO2 SERPL-SCNC: 27 MMOL/L (ref 21–32)
CREAT BLD-MCNC: 1.06 MG/DL (ref 0.55–1.02)
DEPRECATED RDW RBC AUTO: 46.3 FL (ref 35.1–46.3)
EOSINOPHIL # BLD AUTO: 0.19 X10(3) UL (ref 0–0.7)
EOSINOPHIL NFR BLD AUTO: 1.6 %
ERYTHROCYTE [DISTWIDTH] IN BLOOD BY AUTOMATED COUNT: 13.4 % (ref 11–15)
GLOBULIN PLAS-MCNC: 3.7 G/DL (ref 2.8–4.4)
GLUCOSE BLD-MCNC: 92 MG/DL (ref 70–99)
HBV SURFACE AB SER QL: REACTIVE
HBV SURFACE AB SERPL IA-ACNC: 85.29 MIU/ML
HBV SURFACE AG SER-ACNC: <0.1 [IU]/L
HBV SURFACE AG SERPL QL IA: NONREACTIVE
HCT VFR BLD AUTO: 46.3 % (ref 35–48)
HCV AB SERPL QL IA: NONREACTIVE
HDLC SERPL-MCNC: 62 MG/DL (ref 40–59)
HGB BLD-MCNC: 15.5 G/DL (ref 12–16)
IMM GRANULOCYTES # BLD AUTO: 0.08 X10(3) UL (ref 0–1)
IMM GRANULOCYTES NFR BLD: 0.7 %
LDLC SERPL CALC-MCNC: 103 MG/DL (ref ?–100)
LYMPHOCYTES # BLD AUTO: 1.83 X10(3) UL (ref 1–4)
LYMPHOCYTES NFR BLD AUTO: 15.1 %
M PROTEIN MFR SERPL ELPH: 7.8 G/DL (ref 6.4–8.2)
MCH RBC QN AUTO: 31.6 PG (ref 26–34)
MCHC RBC AUTO-ENTMCNC: 33.5 G/DL (ref 31–37)
MCV RBC AUTO: 94.5 FL (ref 80–100)
MONOCYTES # BLD AUTO: 0.79 X10(3) UL (ref 0.1–1)
MONOCYTES NFR BLD AUTO: 6.5 %
N GONORRHOEA DNA SPEC QL NAA+PROBE: NEGATIVE
NEUTROPHILS # BLD AUTO: 9.15 X10 (3) UL (ref 1.5–7.7)
NEUTROPHILS # BLD AUTO: 9.15 X10(3) UL (ref 1.5–7.7)
NEUTROPHILS NFR BLD AUTO: 75.5 %
NONHDLC SERPL-MCNC: 120 MG/DL (ref ?–130)
OSMOLALITY SERPL CALC.SUM OF ELEC: 284 MOSM/KG (ref 275–295)
PATIENT FASTING Y/N/NP: YES
PATIENT FASTING Y/N/NP: YES
PLATELET # BLD AUTO: 315 10(3)UL (ref 150–450)
POTASSIUM SERPL-SCNC: 4.2 MMOL/L (ref 3.5–5.1)
RBC # BLD AUTO: 4.9 X10(6)UL (ref 3.8–5.3)
SODIUM SERPL-SCNC: 138 MMOL/L (ref 136–145)
T PALLIDUM AB SER QL IA: NONREACTIVE
TRIGL SERPL-MCNC: 85 MG/DL (ref 30–149)
TSI SER-ACNC: 1.32 MIU/ML (ref 0.36–3.74)
VLDLC SERPL CALC-MCNC: 17 MG/DL (ref 0–30)
WBC # BLD AUTO: 12.1 X10(3) UL (ref 4–11)

## 2020-09-18 PROCEDURE — 87591 N.GONORRHOEAE DNA AMP PROB: CPT

## 2020-09-18 PROCEDURE — 84443 ASSAY THYROID STIM HORMONE: CPT

## 2020-09-18 PROCEDURE — 80061 LIPID PANEL: CPT

## 2020-09-18 PROCEDURE — 87491 CHLMYD TRACH DNA AMP PROBE: CPT

## 2020-09-18 PROCEDURE — 87389 HIV-1 AG W/HIV-1&-2 AB AG IA: CPT

## 2020-09-18 PROCEDURE — 86803 HEPATITIS C AB TEST: CPT

## 2020-09-18 PROCEDURE — 86780 TREPONEMA PALLIDUM: CPT

## 2020-09-18 PROCEDURE — 80053 COMPREHEN METABOLIC PANEL: CPT

## 2020-09-18 PROCEDURE — 87340 HEPATITIS B SURFACE AG IA: CPT

## 2020-09-18 PROCEDURE — 86706 HEP B SURFACE ANTIBODY: CPT

## 2020-09-18 PROCEDURE — 85025 COMPLETE CBC W/AUTO DIFF WBC: CPT

## 2020-09-22 PROCEDURE — 87624 HPV HI-RISK TYP POOLED RSLT: CPT | Performed by: NURSE PRACTITIONER

## 2020-09-22 PROCEDURE — 88175 CYTOPATH C/V AUTO FLUID REDO: CPT | Performed by: NURSE PRACTITIONER

## 2020-10-28 ENCOUNTER — HOSPITAL ENCOUNTER (OUTPATIENT)
Dept: MAMMOGRAPHY | Age: 45
Discharge: HOME | End: 2020-10-28
Attending: FAMILY MEDICINE
Payer: COMMERCIAL

## 2020-10-28 DIAGNOSIS — Z20.2 POSSIBLE EXPOSURE TO STD: ICD-10-CM

## 2020-10-28 DIAGNOSIS — Z12.31 SCREENING MAMMOGRAM, ENCOUNTER FOR: ICD-10-CM

## 2020-10-28 PROCEDURE — 77067 SCR MAMMO BI INCL CAD: CPT | Performed by: FAMILY MEDICINE

## 2020-10-28 PROCEDURE — 77063 BREAST TOMOSYNTHESIS BI: CPT | Performed by: FAMILY MEDICINE

## 2022-12-05 ENCOUNTER — OFFICE VISIT (OUTPATIENT)
Dept: FAMILY MEDICINE CLINIC | Facility: CLINIC | Age: 47
End: 2022-12-05
Payer: COMMERCIAL

## 2022-12-05 ENCOUNTER — TELEPHONE (OUTPATIENT)
Dept: FAMILY MEDICINE CLINIC | Facility: CLINIC | Age: 47
End: 2022-12-05

## 2022-12-05 VITALS
OXYGEN SATURATION: 98 % | BODY MASS INDEX: 26.22 KG/M2 | WEIGHT: 148 LBS | RESPIRATION RATE: 16 BRPM | SYSTOLIC BLOOD PRESSURE: 122 MMHG | HEIGHT: 63 IN | TEMPERATURE: 98 F | HEART RATE: 88 BPM | DIASTOLIC BLOOD PRESSURE: 74 MMHG

## 2022-12-05 DIAGNOSIS — Z00.00 WELLNESS EXAMINATION: Primary | ICD-10-CM

## 2022-12-05 DIAGNOSIS — Z12.11 SCREEN FOR COLON CANCER: ICD-10-CM

## 2022-12-05 DIAGNOSIS — Z12.31 ENCOUNTER FOR SCREENING MAMMOGRAM FOR MALIGNANT NEOPLASM OF BREAST: ICD-10-CM

## 2022-12-05 PROCEDURE — 3074F SYST BP LT 130 MM HG: CPT | Performed by: FAMILY MEDICINE

## 2022-12-05 PROCEDURE — 99396 PREV VISIT EST AGE 40-64: CPT | Performed by: FAMILY MEDICINE

## 2022-12-05 PROCEDURE — 3008F BODY MASS INDEX DOCD: CPT | Performed by: FAMILY MEDICINE

## 2022-12-05 PROCEDURE — 3078F DIAST BP <80 MM HG: CPT | Performed by: FAMILY MEDICINE

## 2022-12-05 NOTE — PATIENT INSTRUCTIONS
Thank you for choosing Madan Taylor MD at [place] [address]  To Do: Lottie Risk  1. Please see age appropriate health prevention below    [facility] is located in [address]. Monday, Tuesday & Friday - 8 a.m. to 4 p.m. Wednesday, Thursday - 7 a.m. to 3 p.m. The lab is closed daily from 12 p.m.-12:30 p.m. Saturday lab hours by appointment. Call [phone] to schedule the appointment. Please signup for [employer], which is electronic access to your record if you have not done so. All your results will post on there. [email or web address]   You can NOW use [employer] to book your appointments with us, or consider using open access scheduling which is through the edward website [email or web address] and type in Madan Taylor MD and follow the links for \"Schedule Online Now\"    To schedule Imaging or tests at [facility] Scheduling [phone], Go to [facility] A ER Building (For example: CT scans, X rays, Ultrasound, MRI)  Cardiac Testing in ER building Building A second floor Cardiac Testing [phone] (For example: Holter Monitor, Cardiac Stress tests,Event Monitor, or 2D Echocardiograms)  [facility] call [phone] usually in dg A  Walk in Clinic in [place] at [facility]. [address] Mon-Fri at 8am-7:30 p.m., and Sat/Sun 9:00a. m.-4:30 p.m. Also at [address] [facility]  Call [phone] for info     Please call our office about any questions regarding your treatment/medicines/tests as a result of today's visit. For your safety, read the entire package insert of all medicines prescribed to you and be aware of all of the risks of treatment even beyond those discussed today. All therapies have potential risk of harm or side effects or medication interactions.   It is your duty and for your safety to discuss with the pharmacist and our office with questions, and to notify us and stop treatment if problems arise, but know that our intention is that the benefits outweigh those potential risks and we strive to make you healthier and to improve your quality of life. Referrals, and Radiology Information:    If your insurance requires a referral to a specialist, please allow 5 business days to process your referral request.    If Claudio Oneill MD orders a CT or MRI, it may take up to 10 business days to receive approval from your insurance company. Once our office has called informing you that the insurance company approved your testing, please call Central Scheduling at [phone]  Please allow our office 5 business days to contact you regarding any testing results. Refill policies:   Allow 3 business days for refills; controlled substances may take longer and must be picked up from the office in person. Narcotic medications can only be filled in 30 day increments and must be refilled at an office visit only. If your prescription is due for a refill, you may be due for a follow-up appointment. We cannot refill your maintenance medications at a preventative wellness visit. To best provide you care, patients receiving maintenance medications need to be seen at least twice a year.

## 2023-11-20 PROCEDURE — 87624 HPV HI-RISK TYP POOLED RSLT: CPT | Performed by: OBSTETRICS & GYNECOLOGY

## 2023-11-20 PROCEDURE — 88175 CYTOPATH C/V AUTO FLUID REDO: CPT | Performed by: OBSTETRICS & GYNECOLOGY

## 2023-11-27 ENCOUNTER — HOSPITAL ENCOUNTER (OUTPATIENT)
Dept: MAMMOGRAPHY | Age: 48
Discharge: HOME | End: 2023-11-27
Attending: FAMILY MEDICINE
Payer: COMMERCIAL

## 2023-11-27 DIAGNOSIS — Z12.31 ENCOUNTER FOR SCREENING MAMMOGRAM FOR MALIGNANT NEOPLASM OF BREAST: ICD-10-CM

## 2023-11-27 PROCEDURE — 77067 SCR MAMMO BI INCL CAD: CPT | Performed by: FAMILY MEDICINE

## 2023-11-27 PROCEDURE — 77063 BREAST TOMOSYNTHESIS BI: CPT | Performed by: FAMILY MEDICINE

## 2023-12-11 ENCOUNTER — HOSPITAL ENCOUNTER (OUTPATIENT)
Dept: ULTRASOUND IMAGING | Age: 48
Discharge: HOME | End: 2023-12-11
Attending: FAMILY MEDICINE
Payer: COMMERCIAL

## 2023-12-11 ENCOUNTER — HOSPITAL ENCOUNTER (OUTPATIENT)
Dept: MAMMOGRAPHY | Age: 48
Discharge: HOME | End: 2023-12-11
Attending: FAMILY MEDICINE
Payer: COMMERCIAL

## 2023-12-11 DIAGNOSIS — R92.2 INCONCLUSIVE MAMMOGRAM: ICD-10-CM

## 2023-12-11 PROCEDURE — 77065 DX MAMMO INCL CAD UNI: CPT | Performed by: FAMILY MEDICINE

## 2023-12-11 PROCEDURE — 76642 ULTRASOUND BREAST LIMITED: CPT | Performed by: FAMILY MEDICINE

## 2023-12-11 PROCEDURE — 77061 BREAST TOMOSYNTHESIS UNI: CPT | Performed by: FAMILY MEDICINE

## 2023-12-11 NOTE — IMAGING NOTE
Leonardo Capps is recommended for an ultrasound guided biopsy of the left breast by   1223: Spoke with Leonardo Capps at this time. History Inconclusive mammogram   Findings- left breast small nodule with irregular margins and ultrasound demonstrates a corresponding well-marginated hypoechoic nodule at 0230, 8 cm from   nipple with internal flow measuring 5 x 4 x 5 mm. Recommendation-ULTRASOUND-GUIDED BIOPSY: LEFT BREAST     See EMR for complete imaging report    Medications and allergies reviewed: NKDA reported  No current outpatient medications on file. Leonardo Capps denies the use of prescribed anticoagulants, denies known bleeding disorders and/or liver disease, denies chemotherapy    Procedure explained and questions answered. Juli Acharya provided with written educational material by the imaging staff at the time of the recommendation. Leonardo Capps instructed to take 1000 mg of acetaminophen on the day of the biopsy, eat a light meal, and bring or wear a sport bra. Post biopsy care and instruction reviewed: including no lifting more than five pounds, no upper body exercise, icing of biopsy site, no submerging in water. Leonardo Capps verbalized understanding. Ms. Jean Marie Yee informed that the [facility] would be contacting her once the biopsy order is received to schedule an appointment.

## 2023-12-18 ENCOUNTER — TELEPHONE (OUTPATIENT)
Dept: FAMILY MEDICINE CLINIC | Facility: CLINIC | Age: 48
End: 2023-12-18

## 2023-12-18 NOTE — TELEPHONE ENCOUNTER
Ramon Inocente- Jasson is having Breast Biopsy on Friday and would like something called into the pharmacy as she is feeling anxious and would like something to take the edge off.     [facility] [address] AT [place] [address] OF  [address], [phone], [phone]

## 2023-12-22 ENCOUNTER — HOSPITAL ENCOUNTER (OUTPATIENT)
Dept: MAMMOGRAPHY | Facility: HOSPITAL | Age: 48
Discharge: HOME | End: 2023-12-22
Attending: FAMILY MEDICINE
Payer: COMMERCIAL

## 2023-12-22 DIAGNOSIS — N63.0 BREAST MASS: ICD-10-CM

## 2023-12-22 PROCEDURE — 88305 TISSUE EXAM BY PATHOLOGIST: CPT | Performed by: FAMILY MEDICINE

## 2023-12-22 PROCEDURE — 88360 TUMOR IMMUNOHISTOCHEM/MANUAL: CPT | Performed by: FAMILY MEDICINE

## 2023-12-22 PROCEDURE — 88341 IMHCHEM/IMCYTCHM EA ADD ANTB: CPT | Performed by: FAMILY MEDICINE

## 2023-12-22 PROCEDURE — 19083 BX BREAST 1ST LESION US IMAG: CPT | Performed by: FAMILY MEDICINE

## 2023-12-22 PROCEDURE — 88377 M/PHMTRC ALYS ISHQUANT/SEMIQ: CPT | Performed by: FAMILY MEDICINE

## 2023-12-22 PROCEDURE — 88342 IMHCHEM/IMCYTCHM 1ST ANTB: CPT | Performed by: FAMILY MEDICINE

## 2023-12-22 PROCEDURE — 77065 DX MAMMO INCL CAD UNI: CPT | Performed by: FAMILY MEDICINE

## 2023-12-26 ENCOUNTER — TELEPHONE (OUTPATIENT)
Dept: MAMMOGRAPHY | Facility: HOSPITAL | Age: 48
End: 2023-12-26

## 2023-12-26 NOTE — TELEPHONE ENCOUNTER
Telephoned Sb Garner and name,  verified with patient. Notified Sbcale Garner of left breast positive for ILC and LCIS biopsy result. Concordance pending. Sb Garner reports biopsy site is healing well. Radiologist recommends surgical consultation. Dr Gil Haile' office is referring patient to Dr Olman Santiago. Patient provided with the contact information for Dr Olman Santiago, the Breast Program Navigators, and myself and informed that one of us will be getting back to her with an appointment date and time. Pt verbalized understanding and had no further questions at this time.

## 2023-12-27 ENCOUNTER — TELEPHONE (OUTPATIENT)
Dept: MAMMOGRAPHY | Facility: HOSPITAL | Age: 48
End: 2023-12-27

## 2023-12-27 NOTE — TELEPHONE ENCOUNTER
Called and spoke to patient. Patient accepted an appt with Dr Deepthi Sales on Tuesday, 1-2-24 at 9 am. Directions given. Patient verbalized understanding and has no further questions at this time.

## 2023-12-28 ENCOUNTER — TELEPHONE (OUTPATIENT)
Dept: HEMATOLOGY/ONCOLOGY | Facility: HOSPITAL | Age: 48
End: 2023-12-28

## 2023-12-28 NOTE — TELEPHONE ENCOUNTER
Phoned patient and we discussed newly diagnosed breast cancer. Introduced myself as one of the breast nurse navigators and explained the role of the breast nurse navigator. Explained the role of the physicians on her breast cancer care team. Reviewed over pathology report and discussed the type of breast cancer, grade, and ER/[place] and Her 2. Briefly discussed breast cancer treatments including surgery, radiation, hormone therapy, and chemotherapy. Explained the breast cancer multidisciplinary meeting and that her case will be discussed. Offered to schedule genetics, will schedule in the next  2 weeks. Patient given my contact information to call with any further questions or concerns.

## 2023-12-28 NOTE — TELEPHONE ENCOUNTER
Left message for patient to call back. Calling to introduce myself as one of the navigators and to schedule for genetic testing. Number provided for call back.

## 2024-01-02 ENCOUNTER — TELEPHONE (OUTPATIENT)
Dept: HEMATOLOGY/ONCOLOGY | Facility: HOSPITAL | Age: 49
End: 2024-01-02

## 2024-01-02 ENCOUNTER — NURSE NAVIGATOR ENCOUNTER (OUTPATIENT)
Dept: HEMATOLOGY/ONCOLOGY | Facility: HOSPITAL | Age: 49
End: 2024-01-02

## 2024-01-02 ENCOUNTER — OFFICE VISIT (OUTPATIENT)
Facility: LOCATION | Age: 49
End: 2024-01-02
Payer: COMMERCIAL

## 2024-01-02 ENCOUNTER — OFFICE VISIT (OUTPATIENT)
Dept: HEMATOLOGY/ONCOLOGY | Facility: HOSPITAL | Age: 49
End: 2024-01-02
Attending: SURGERY
Payer: COMMERCIAL

## 2024-01-02 DIAGNOSIS — F41.9 ANXIETY: ICD-10-CM

## 2024-01-02 DIAGNOSIS — C50.412 MALIGNANT NEOPLASM OF UPPER-OUTER QUADRANT OF LEFT BREAST IN FEMALE, ESTROGEN RECEPTOR POSITIVE  (HCC): Primary | ICD-10-CM

## 2024-01-02 DIAGNOSIS — Z17.0 MALIGNANT NEOPLASM OF UPPER-OUTER QUADRANT OF LEFT BREAST IN FEMALE, ESTROGEN RECEPTOR POSITIVE  (HCC): Primary | ICD-10-CM

## 2024-01-02 PROCEDURE — 99211 OFF/OP EST MAY X REQ PHY/QHP: CPT

## 2024-01-02 PROCEDURE — 99245 OFF/OP CONSLTJ NEW/EST HI 55: CPT | Performed by: SURGERY

## 2024-01-02 NOTE — H&P
New Patient Visit Note       Active Problems      1. Malignant neoplasm of upper-outer quadrant of left breast in female, estrogen receptor positive  (HCC)    2. Anxiety        Chief Complaint   New diagnosis of breast cancer      History of Present Illness   The patient is a 48-year-old female seen at the request of her primary care physician regarding a new diagnosis of left breast cancer.  She is accompanied by her mother.  The patient had noticed no new masses or nipple drainage.  She had noticed a little more breast pain around the time of her menstrual cycles, assuming that was because she is approaching menopause.  She underwent routine imaging, followed by additional views.  Ultimately, an ultrasound-guided breast biopsy revealed invasive lobular carcinoma.    The patient does have a family history of breast cancer.  Her mother had the diagnosis at the age of 47 of DCIS.  She underwent lumpectomy followed by radiation therapy.  The patient's maternal grandmother also had breast cancer.  The patient denies family history of ovarian or prostate cancer.  Her brother did have cholangiocarcinoma.    The patient was 32 when she was first pregnant.  She has been pregnant 3 times and has 3 children.  She attempted breast-feeding.  She was 14 at the age of menarche and continues to have menstrual cycles.  She was on birth control pills from the age of 21 until 31.  She also had an IUD in the past.  Currently, she is not on any birth control.    The patient also reports anxiety, especially with this new diagnosis.  She is requesting to meet with a counselor.      Allergies  Harleen is allergic to other.    Past Medical / Surgical / Social / Family History    The past medical and past surgical history have been reviewed by me today.    Past Medical History:   Diagnosis Date    Fibroadenoma of left breast 2015    Varicose vein 2015     Past Surgical History:   Procedure Laterality Date       2008,2009,2018    EXTRACTION ERUPTED TOOTH/EXR      wisdom teeth    INSERT INTRAUTERINE DEVICE  2011    Mirena insertion    OTHER SURGICAL HISTORY Bilateral 01/22/2018    RLTCS with bilateral Salpingectomies    REMOVE INTRAUTERINE DEVICE  2015    Mirena removal       The family history and social history have been reviewed by me today.    Family History   Problem Relation Age of Onset    Breast Cancer Mother 46    Hypertension Mother     Psychiatric Mother         depression    Hypertension Father     Diabetes Father     Heart Disorder Father 52        quad bypass    Other (hypothyroid) Father     Other (Cholangiocarinoma) Brother 36    Breast Cancer Maternal Grandmother 63     Social History     Socioeconomic History    Marital status:    Tobacco Use    Smoking status: Never    Smokeless tobacco: Never   Substance and Sexual Activity    Alcohol use: No     Comment: 7-10 drinks a week, not during pregnancy    Drug use: No   Other Topics Concern    Caffeine Concern Yes     Comment: 3 cups of coffee daily    Exercise No      No current outpatient medications on file.      Review of Systems:    Allergic/Immuno:  Review of patient's allergies performed.  Cardiovascular:  Negative for cool extremity and irregular heartbeat/palpitations  Constitutional:  Negative for decreased activity, fever, irritability and lethargy  Endocrine:  Negative for abnormal sleep patterns, increased activity, polydipsia and polyphagia  ENMT:  Negative for ear drainage, hearing loss and nasal drainage  Eyes:  Negative for eye discharge and vision loss  Gastrointestinal:  Negative for abdominal pain, constipation, decreased appetite, diarrhea and vomiting  Genitourinary:  Negative for dysuria and hematuria  Hema/Lymph:  Negative for easy bleeding and easy bruising  Integumentary:  Negative for pruritus and rash  Musculoskeletal:  Negative for bone/joint symptoms  Neurological:  Negative for gait disturbance  Psychiatric:  Negative for  inappropriate interaction  Respiratory:  Negative for cough, dyspnea and wheezing     Physical Findings   [facility] 11/26/2023 (Exact Date)   Physical Exam  Vitals and nursing note reviewed.   Constitutional:       General: She is not in acute distress.     Appearance: She is well-developed. She is not diaphoretic.   HENT:      Head: Normocephalic and atraumatic.   Eyes:      General: No scleral icterus.     Conjunctiva/sclera: Conjunctivae normal.      Pupils: Pupils are equal, round, and reactive to light.   Neck:      Vascular: No JVD.      Trachea: Trachea normal.   Cardiovascular:      Rate and Rhythm: Normal rate and regular rhythm.      Heart sounds: S1 normal and S2 normal. No murmur heard.  Pulmonary:      Effort: No accessory muscle usage or respiratory distress.      Breath sounds: No decreased breath sounds, wheezing, rhonchi or rales.   Chest:      Chest wall: No mass.   Breasts:     Breasts are symmetrical.      Right: No inverted nipple, mass, nipple discharge, skin change or tenderness.      Left: No inverted nipple, mass, nipple discharge, skin change or tenderness.      Comments: Left breast biopsy site healing well  Musculoskeletal:      Cervical back: Full passive range of motion without pain and neck supple.   Lymphadenopathy:      Head:      Right side of head: No submental, submandibular, preauricular, posterior auricular or occipital adenopathy.      Left side of head: No submental, submandibular, preauricular, posterior auricular or occipital adenopathy.      Cervical:      Right cervical: No superficial, deep or posterior cervical adenopathy.     Left cervical: No superficial, deep or posterior cervical adenopathy.      Upper Body:      Right upper body: No axillary or pectoral adenopathy.      Left upper body: No axillary or pectoral adenopathy.   Neurological:      Mental Status: She is alert and oriented to person, place, and time.   Psychiatric:         Speech: Speech normal.          Behavior: Behavior normal.           MAMMOGRAM   I personally viewed the films and agree with the radiologist's interpretation.     FINDINGS:  This is a continuation of the mammogram dated 11/27/2023.  Additional views of the left breast confirm a small nodule with irregular margins and ultrasound demonstrates a corresponding well-marginated hypoechoic nodule at 0230, 8 cm from  nipple with internal flow measuring 5 x 4 x 5 mm.  Ultrasound-guided biopsy is recommended.  There is no axillary lymphadenopathy.                   Impression   CONCLUSION:  Recommend ultrasound-guided biopsy of small mass in upper outer quadrant of left breast.  This recommendation was communicated to the patient at the time of examination, and a message regarding this recommendation was sent via the electronic   medical record to Dr. Jones.     BI-RADS CATEGORY:    DIAGNOSTIC CATEGORY 4c-HIGH SUSPICION, BUT NOT CLASSIC FOR MALIGNANCY:       RECOMMENDATIONS:    ULTRASOUND-GUIDED BIOPSY: LEFT BREAST                A letter explaining the results in lay terms has been sent to the patient.  This exam was evaluated with a computer-aided device.  This patient's information has been entered into a reminder system with a target due date for the next mammogram.        LOCATION:  [place]              Dictated by (CST): Norm Person MD on 12/11/2023 at 9:44 AM           PATHOLOGY   I reviewed the pathology report.     Final Diagnosis:   Ultrasound-guided core biopsy, left breast mass, 2:30, upper outer quadrant:  -Invasive lobular carcinoma.        -Grade 2 (Tubules: 3, Nuclei: 2, Mitoses: 1).        -Largest focus of tumor measures 5 mm (0.5 cm); present in 80% of submitted tissue.   -Lobular carcinoma in situ (LCIS).      Electronically signed by Clementine Hall MD on 12/26/2023     Antibody & Clone             Result Interpretation   Estrogen Receptor   -   SP1 100 % Positive Cells Strong Positive   Progesterone Receptor   -   16 100 %  Positive Cells Strong Positive   KI-67   -   MM1 12 % Positive Cells Intermediate   Her2   -   CB11 2+ Equivocal **         Assessment   1. Malignant neoplasm of upper-outer quadrant of left breast in female, estrogen receptor positive  (HCC)    2. Anxiety          Plan   I had a lengthy discussion with the patient regarding the diagnosis of breast cancer.  We discussed the biology of breast cancer as well as the difference between in situ and invasive disease.  We discussed the role of breast MRI as well the advantages and disadvantages of breast MRI.  If MRI shows multifocal disease based on imaging or potential other biopsies, she will need a mastectomy.  Since the morphology is lobular, which is often larger than the mammogram estimates, and she has dense breast tissue, I would like her to proceed with MRI.  I will reach out to her with those results.  We discussed the treatment options of breast cancer in regards to surgery, radiation, chemotherapy, and endocrine therapy.  In regards to surgery, we discussed the options of lumpectomy and mastectomy and the difference between each option.  The possibility of another surgery to achieve clear margins was also discussed.  If she opts for mastectomy, the option of reconstruction was discussed.  The risks of surgery were discussed including bleeding, infection, need for reoperation, and arm swelling.  She expressed understanding.  We discussed the role of radiation therapy and if she opts for lumpectomy, she will need radiation therapy and the length and course of radiation therapy will depend on the final pathology.  We also discussed situations where radiation therapy is needed after mastectomy (large tumor size, positive lymph nodes and positive margins on a mastectomy).  The final decision regarding raditation therapy would be made after surgery.  In regards to chemotherapy, I will send her to medical oncology.  Based on the final pathology report it will be  determined if chemotherapy or endocrine therapy would provide any additional benefit.    Because of her age and family history, she does qualify for genetic testing.  She has an appointment to meet the genetic counselor next week.  The patient very much would like to proceed with a lumpectomy with sentinel lymph node biopsy.  Even if she is gene positive, she is considering moving forward with lumpectomy and increased surveillance.  I will reach out to her with the results of the MRI, which will allow her time to consider her surgical options.  I briefly discussed that she would be a candidate for tamoxifen therapy, if she is premenopausal.  Her mother, who is a  and had breast cancer, declined tamoxifen therapy herself because she knows of people who  of other cancers as a direct result of tamoxifen.  She is fearful for her daughter to take the tamoxifen.  I encouraged the patient to discuss her fears with the medical oncologist when we are at that point in her treatment plan.  I have also referred the patient on for counseling.    All of her questions were answered to her satisfaction.  Preoperatively, the patient has stage I breast cancer.       Imaging & Referrals   MRI BREAST (W+WO) W/CAD BILAT (JTB=[id number])      Caro Nicole MD

## 2024-01-02 NOTE — PROGRESS NOTES
Met with patient and her mother in Dr. Nicole's clinic. Introduced myself as one the breast nurse navigators and explained the role of the breast nurse navigator and coordination of care. Explained the role of all of the physicians involved in her care including the surgeon, medical oncologist, radiation oncologist, and possibly plastic surgeon. Reviewed over the patients pathology report and discussed receptors including ER/AK/ and Her 2. Will contact patient with these results if they are not available. Patient was given the breast cancer treatment handbook and reviewed over how to use this resource. Discussed the breast multidisciplinary conference and that her case was discussed. Patient was given the contact information for the social workers at the [facility] and resources for support as requested. Breast cancer journey map provided to patient and discussed possible Oncotype, if applicable, and other cancer treatments such as chemotherapy and radiation. Provided lumpectomy surgical sheet. Next step in care will be to schedule breast MRI.     Pt was provided with breast nurse navigator contact information and was encouraged to phone with any other questions or concerns.

## 2024-01-02 NOTE — PROGRESS NOTES
Patient presents to clinic for breast consultation accompanied by her mother. Referred by Ezra Jones MD.  [facility] JACKELYN 2D+3D screening bilateral views performed on 11/27/23 .  Additional views, Ultrasound left breast performed on 12/11/23.  Left breast biopsy on 12/22/23 resulted as invasive lobular carcinoma and lobular carcinoma in situ.  Patient's blood pressure at arrival was 150/97.  Patient complains of tenderness/pain reaching only 4/10 which patient states is mild.  States that she doesn't take any medication for the pain.  Patient denies swelling, redness, warmth, bleeding/discharge, or fever.  Dr. Nicole informed.  Medication, allergies, and history reviewed and updated.      Post visit BP was 145/92.  Dr. Nicole notified and patient instructed to contact her PCP regarding this issue.

## 2024-01-02 NOTE — TELEPHONE ENCOUNTER
Called patient to notify her that she is scheduled for her breast MRI on Thursday January 4, 2024 at 1530 and to arrive 15-30 minutes at the [facility]. She verbalized understanding and thanked me for the assistance. Pt was provided with the breast nurse navigators contact information and was encouraged to phone with any other questions or concerns.

## 2024-01-03 ENCOUNTER — TELEPHONE (OUTPATIENT)
Dept: FAMILY MEDICINE CLINIC | Facility: CLINIC | Age: 49
End: 2024-01-03

## 2024-01-03 NOTE — TELEPHONE ENCOUNTER
Future Appointments   Date Time Provider Department Center   1/4/2024  3:30 PM  MR RM1 (1.5T WIDE)  MRI [facility]   1/8/2024  8:00 AM Ezra Jones MD [facility] [address] [facility] [address]   1/8/2024  1:00 PM Laura Dasilva, CHRISW [facility]   1/8/2024  2:00 PM Shira De Leon  GENETIC [facility]   1/8/2024  3:00 PM  OOT  CHEMO [facility]   1/22/2024  2:00 PM Ezra Jones MD [facility] [address] [facility] [address]

## 2024-01-03 NOTE — TELEPHONE ENCOUNTER
Pt states she was recently diagnosed w/breast cancer. At recent visit w/surgical oncologist, Pt had high BP, 150/100 3x's taken while at the visit. Pt does not take her BP at home.    Future Appointments   Date Time Provider Department Center   1/4/2024  3:30 PM  MR RM1 (1.5T WIDE)  MRI [facility]   1/8/2024  1:00 PM Laura Dasilva, SIXTO [facility]   1/8/2024  2:00 PM Shira De Leon  GEN[facility]   1/8/2024  3:00 PM  OOT  CHEMO [facility]   1/22/2024  2:00 PM Ezra Jones MD [facility] [address] [facility] [address]

## 2024-01-04 ENCOUNTER — TELEPHONE (OUTPATIENT)
Dept: HEMATOLOGY/ONCOLOGY | Facility: HOSPITAL | Age: 49
End: 2024-01-04

## 2024-01-04 ENCOUNTER — HOSPITAL ENCOUNTER (OUTPATIENT)
Dept: MRI IMAGING | Facility: HOSPITAL | Age: 49
Discharge: HOME | End: 2024-01-04
Attending: SURGERY
Payer: COMMERCIAL

## 2024-01-04 DIAGNOSIS — Z17.0 MALIGNANT NEOPLASM OF UPPER-OUTER QUADRANT OF LEFT BREAST IN FEMALE, ESTROGEN RECEPTOR POSITIVE  (HCC): ICD-10-CM

## 2024-01-04 DIAGNOSIS — C50.412 MALIGNANT NEOPLASM OF UPPER-OUTER QUADRANT OF LEFT BREAST IN FEMALE, ESTROGEN RECEPTOR POSITIVE  (HCC): ICD-10-CM

## 2024-01-04 PROCEDURE — 77049 MRI BREAST C-+ W/CAD BI: CPT | Performed by: SURGERY

## 2024-01-04 PROCEDURE — A9575 INJ GADOTERATE MEGLUMI 0.1ML: HCPCS

## 2024-01-04 RX ORDER — GADOTERATE MEGLUMINE 376.9 MG/ML
15 INJECTION INTRAVENOUS
Status: COMPLETED | OUTPATIENT
Start: 2024-01-04 | End: 2024-01-04

## 2024-01-04 RX ADMIN — GADOTERATE MEGLUMINE 15 ML: 376.9 INJECTION INTRAVENOUS at 16:13:00

## 2024-01-04 NOTE — TELEPHONE ENCOUNTER
Called patient and verified her full name and  and provided her Her-2/FISH results were negative. Answered her questions to the best of my ability and provided her directions to her breast MRI since she was at [facility] for her test. She thanked me for the phone call and assistance. Pt was provided with the breast nurse navigators contact information and was encouraged to phone with any other questions or concerns.

## 2024-01-05 ENCOUNTER — TELEPHONE (OUTPATIENT)
Facility: LOCATION | Age: 49
End: 2024-01-05

## 2024-01-05 ENCOUNTER — TELEPHONE (OUTPATIENT)
Dept: HEMATOLOGY/ONCOLOGY | Facility: HOSPITAL | Age: 49
End: 2024-01-05

## 2024-01-05 NOTE — TELEPHONE ENCOUNTER
Called patient and asked if she saw and read Dr. Nicole's note in regards to her breast MRI results. She stated she had and was not able to call her back since she was busy at work. Verified her full name and  and reviewed her breast MRI results. I stated to her that the area of concern is larger in the MRI than her recent mammogram and it is warranting the team to place her back on our multidisciplinary conference to discuss the results of her MRI and possible treatment plans such as chemotherapy and to have a consultation with the medical oncologist, Dr. May. I offered her an appointment with Dr. May for 2024 at 1400 at the [facility]. She accepted the appointment and verbalized understanding of the results and that the team will be discussing her case on Tuesday morning before her appointment. Verbal and emotional reassurance was provided to patient. She thanked me for the phone call and assistance. Pt was provided with the breast nurse navigators contact information and was encouraged to phone with any other questions or concerns.

## 2024-01-05 NOTE — TELEPHONE ENCOUNTER
I left a detailed voicemail message on the patient's mobile phone number.  I discussed that the MRI shows a lot more enhancement that was was expected.  Happily, the right breast is completely benign.    I did notify her that the breast navigators will be reaching out to her to help set up an appointment with one of the medical oncologist.  We will also review her imaging at breast conference next Tuesday, and I will call her after that discussion.    I asked her to call with any further questions.

## 2024-01-08 ENCOUNTER — NURSE ONLY (OUTPATIENT)
Dept: HEMATOLOGY/ONCOLOGY | Facility: HOSPITAL | Age: 49
End: 2024-01-08
Attending: SURGERY
Payer: COMMERCIAL

## 2024-01-08 ENCOUNTER — GENETICS ENCOUNTER (OUTPATIENT)
Dept: GENETICS | Facility: HOSPITAL | Age: 49
End: 2024-01-08
Attending: SURGERY
Payer: COMMERCIAL

## 2024-01-08 ENCOUNTER — OFFICE VISIT (OUTPATIENT)
Dept: FAMILY MEDICINE CLINIC | Facility: CLINIC | Age: 49
End: 2024-01-08
Payer: COMMERCIAL

## 2024-01-08 VITALS
OXYGEN SATURATION: 98 % | WEIGHT: 157 LBS | BODY MASS INDEX: 27.82 KG/M2 | SYSTOLIC BLOOD PRESSURE: 158 MMHG | TEMPERATURE: 98 F | HEIGHT: 63 IN | RESPIRATION RATE: 16 BRPM | HEART RATE: 88 BPM | DIASTOLIC BLOOD PRESSURE: 100 MMHG

## 2024-01-08 DIAGNOSIS — Z17.0 MALIGNANT NEOPLASM OF UPPER-OUTER QUADRANT OF LEFT BREAST IN FEMALE, ESTROGEN RECEPTOR POSITIVE  (HCC): ICD-10-CM

## 2024-01-08 DIAGNOSIS — Z17.0 MALIGNANT NEOPLASM OF LEFT BREAST IN FEMALE, ESTROGEN RECEPTOR POSITIVE, UNSPECIFIED SITE OF BREAST  (HCC): Primary | ICD-10-CM

## 2024-01-08 DIAGNOSIS — R03.0 ELEVATED BLOOD PRESSURE READING: ICD-10-CM

## 2024-01-08 DIAGNOSIS — Z00.00 WELLNESS EXAMINATION: Primary | ICD-10-CM

## 2024-01-08 DIAGNOSIS — C50.412 MALIGNANT NEOPLASM OF UPPER-OUTER QUADRANT OF LEFT BREAST IN FEMALE, ESTROGEN RECEPTOR POSITIVE  (HCC): ICD-10-CM

## 2024-01-08 DIAGNOSIS — C50.912 MALIGNANT NEOPLASM OF LEFT BREAST IN FEMALE, ESTROGEN RECEPTOR POSITIVE, UNSPECIFIED SITE OF BREAST  (HCC): Primary | ICD-10-CM

## 2024-01-08 PROCEDURE — 96040 HC GENETIC COUNSELING EA 30 MIN: CPT | Performed by: GENETIC COUNSELOR, MS

## 2024-01-08 PROCEDURE — 3080F DIAST BP >= 90 MM HG: CPT | Performed by: FAMILY MEDICINE

## 2024-01-08 PROCEDURE — 36415 COLL VENOUS BLD VENIPUNCTURE: CPT

## 2024-01-08 PROCEDURE — 3077F SYST BP >= 140 MM HG: CPT | Performed by: FAMILY MEDICINE

## 2024-01-08 PROCEDURE — 99396 PREV VISIT EST AGE 40-64: CPT | Performed by: FAMILY MEDICINE

## 2024-01-08 PROCEDURE — 3008F BODY MASS INDEX DOCD: CPT | Performed by: FAMILY MEDICINE

## 2024-01-08 NOTE — PATIENT INSTRUCTIONS
Thank you for choosing Ezra Jones MD at [facility]  To Do: Harleen Euceda  1. High Blood pressure  What Is a Normal Blood Pressure?  The Joint National Committee on Prevention, Detection, Evaluation, and Treatment of High Blood Pressure has classified blood pressure measurements into several categories:  Normal blood pressure is systolic pressure less than 120 and diastolic pressure less than 80 mmHg.   \"Prehypertension\" is systolic pressure of 120-139 or diastolic pressure of 80-89 mmHg.   Stage 1 Hypertension is blood pressure greater than systolic pressure of 140-159 or diastolic pressure of 90-99 mmHg or greater.   Stage 2 Hypertension is systolic pressure of 160 or greater or diastolic pressure of 100 or greater.  What Health Problems Are Associated With High Blood Pressure?  Several potentially serious health conditions are linked to high blood pressure, including:  Atherosclerosis: a disease of the arteries caused by a buildup of plaque, or fatty material, on the inside walls of the blood vessels; hypertension contributes to this buildup by putting added stress and force on the artery walls.   Heart Disease: Heart failure (the heart is not strong enough to pump blood adequately), ischemic heart disease (the heart tissue doesn't get enough blood), and hypertensive hypertrophic cardiomyopathy (thickened, abnormally functioning heart muscle) are all associated with high blood pressure.   Kidney Disease: Hypertension can damage the blood vessels and filters in the kidneys, so that the kidneys cannot excrete waste properly.   Stroke: Hypertension can lead to stroke, either by contributing to the process of atherosclerosis (which can lead to blockages and/or clots), or by weakening the blood vessel wall and causing it to rupture.   Eye Disease: Hypertension can damage the very small blood vessels in the retina.  Bleeding from the aorta, the large blood vessel that supplies blood to the abdomen,  pelvis, and legs   Heart failure   Poor blood supply to the legs  Erectile Dysfunction  Problems with your vision    Overview  \"Blood pressure\" is the force of blood pushing against the walls of the arteries as the heart pumps blood. If this pressure rises and stays high over time, it can damage the body in many ways.  About 1 in 3 adults in the [place] has HBP. The condition itself usually has no signs or symptoms. You can have it for years without knowing it. During this time, though, HBP can damage your heart, blood vessels, kidneys, and other parts of your body.  Knowing your blood pressure numbers is important, even when you're feeling fine. If your blood pressure is normal, you can work with your health care team to keep it that way. If your blood pressure is too high, treatment may help prevent damage to your body's organs.    By [facility] staff   DASH stands for Dietary Approaches to Stop Hypertension. The DASH diet is a lifelong approach to healthy eating that's designed to help treat or prevent high blood pressure (hypertension). The DASH diet encourages you to reduce the sodium in your diet and eat a variety of foods rich in nutrients that help lower blood pressure, such as potassium, calcium and magnesium.   By following the DASH diet, you may be able to reduce your blood pressure by a few points in just two weeks. Over time, your blood pressure could drop by eight to 14 points, which can make a significant difference in your health risks.   DASH DIET  The low-salt Dietary Approaches to Stop Hypertension (DASH) diet is proven to help lower blood pressure. Its effects on blood pressure are sometimes seen within a few weeks.  This diet is not only rich in important nutrients and fiber, but it also includes foods that contain far more potassium (4,700 milligrams (mg)/day), calcium (1,250 mg/day), and magnesium (500 mg/day) and much less sodium (salt) than the typical American diet.  Limit sodium to  no more than 2,300 mg a day (eating only 1,500 mg a day is an even better goal).   Reduce saturated fat to no more than 6% of daily calories and total fat to 27% of daily calories. Low-fat dairy products appear to be especially beneficial for lowering systolic blood pressure.   When choosing fats, select monounsaturated oils, such as olive or canola oils.   Choose whole grains over white flour or pasta products.   Choose fresh fruits and vegetables every day. Many of these foods are rich in potassium, fiber, or both.   Eat nuts, seeds, or legumes (dried beans or peas) daily.   Choose modest amounts of protein (no more than 18% of total daily calories). Fish, skinless poultry, and soy products are the best protein sources.   Other daily nutrient goals in the DASH diet include limiting carbohydrates to 55% of daily calories and dietary cholesterol to 150 mg. Try to get at least 30 grams (g) of daily fiber.    Grains (6 to 8 servings a day)  Grains include bread, cereal, rice and pasta. Examples of one serving of grains include 1 slice whole-wheat bread, 1 ounce (oz.) dry cereal, or 1/2 cup cooked cereal, rice or pasta.   Focus on whole grains because they have more fiber and nutrients than do refined grains. For instance, use brown rice instead of white rice, whole-wheat pasta instead of regular pasta and whole-grain bread instead of white bread. Look for products labeled \"100 percent whole grain\" or \"100 percent whole wheat.\"   Grains are naturally low in fat, so avoid spreading on butter or adding cream and cheese sauces.   Vegetables (4 to 5 servings a day)  Tomatoes, carrots, broccoli, sweet potatoes, greens and other vegetables are full of fiber, vitamins, and such minerals as potassium and magnesium. Examples of one serving include 1 cup raw leafy green vegetables or 1/2 cup cut-up raw or cooked vegetables.   Don't think of vegetables only as side dishes -- a hearty blend of vegetables served over brown rice or  whole-wheat noodles can serve as the main dish for a meal.   Fresh or frozen vegetables are both good choices. When buying frozen and canned vegetables, choose those labeled as low sodium or without added salt.   To increase the number of servings you fit in daily, be creative. In a stir-rogers, for instance, cut the amount of meat in half and double up on the vegetables.   Fruits (4 to 5 servings a day)  Many fruits need little preparation to become a healthy part of a meal or snack. Like vegetables, they're packed with fiber, potassium and magnesium and are typically low in fat -- exceptions include avocados and coconuts. Examples of one serving include 1 medium fruit or 1/2 cup fresh, frozen or canned fruit.   Have a piece of fruit with meals and one as a snack, then round out your day with a dessert of fresh fruits topped with a splash of low-fat yogurt.   Leave on edible peels whenever possible. The peels of apples, pears and most fruits with pits add interesting texture to recipes and contain healthy nutrients and fiber.   Remember that citrus fruits and juice, such as grapefruit, can interact with certain medications, so check with your doctor or pharmacist to see if they're OK for you.   Dairy (2 to 3 servings a day)  Milk, yogurt, cheese and other dairy products are major sources of calcium, vitamin D and protein. But the key is to make sure that you choose dairy products that are low-fat or fat-free because otherwise they can be a major source of fat. Examples of one serving include 1 cup skim or 1% milk, 1 cup yogurt or 1 1/2 oz. cheese.   Low-fat or fat-free frozen yogurt can help you boost the amount of dairy products you eat while offering a sweet treat. Add fruit for a healthy twist.   If you have trouble digesting dairy products, choose lactose-free products or consider taking an over-the-counter product that contains the enzyme lactase, which can reduce or prevent the symptoms of lactose intolerance.    Go easy on regular and even fat-free cheeses because they are typically high in sodium.   Lean meat, poultry and fish (6 or fewer servings a day)  Meat can be a rich source of protein, B vitamins, iron and zinc. But because even lean varieties contain fat and cholesterol, don't make them a mainstay of your diet -- cut back typical meat portions by one-third or one-half and pile on the vegetables instead. Examples of one serving include 1 oz. cooked skinless poultry, seafood or lean meat, 1 egg, or 1 oz. water-packed, no-salt-added canned tuna.   Trim away skin and fat from meat and then broil, grill, roast or poach instead of frying.   Eat heart-healthy fish, such as salmon, herring and tuna. These types of fish are high in omega-3 fatty acids, which can help lower your total cholesterol.   Nuts, seeds and legumes (4 to 5 servings a week)   Almonds, sunflower seeds, kidney beans, peas, lentils and other foods in this family are good sources of magnesium, potassium and protein. They're also full of fiber and phytochemicals, which are plant compounds that may protect against some cancers and cardiovascular disease. Serving sizes are small and are intended to be consumed weekly because these foods are high in calories. Examples of one serving include 1/3 cup (1 1/2 oz.) nuts, 2 tablespoons seeds or 1/2 cup cooked beans or peas.   Nuts sometimes get a bad rap because of their fat content, but they contain healthy types of fat -- monounsaturated fat and omega-3 fatty acids. They're high in calories, however, so eat them in moderation. Try adding them to stir-fries, salads or cereals.   Soybean-based products, such as tofu and tempeh, can be a good alternative to meat because they contain all of the amino acids your body needs to make a complete protein, just like meat. They also contain isoflavones, a type of natural plant compound (phytochemical) that has been shown to have some health benefits.   Fats and oils (2 to  3 servings a day)  Fat helps your body absorb essential vitamins and helps your body's immune system. But too much fat increases your risk of heart disease, diabetes and obesity. The DASH diet strives for a healthy balance by providing 30 percent or less of daily calories from fat, with a focus on the healthier unsaturated fats. Examples of one serving include 1 teaspoon soft margarine, 1 tablespoon low-fat mayonnaise or 2 tablespoons light salad dressing.   Saturated fat and trans fat are the main dietary culprits in raising your blood cholesterol and increasing your risk of coronary artery disease. DASH helps keep your daily saturated fat to less than 10 percent of your total calories by limiting use of meat, butter, cheese, whole milk, cream and eggs in your diet, along with foods made from lard, solid shortenings, and palm and coconut oils.   Avoid trans fat, commonly found in such processed foods as crackers, baked goods and fried items.   Read food labels on margarine and salad dressing so that you can choose those that are lowest in saturated fat and free of trans fat.   Sweets (5 or fewer a week)  You don't have to banish sweets entirely while following the DASH diet -- just go easy on them. Examples of one serving include 1 tablespoon sugar, jelly or jam, 1/2 cup sorbet or 1 cup (8 oz.) lemonade.   When you eat sweets, choose those that are fat-free or low-fat, such as sorbets, fruit ices, jelly beans, hard candy, rosa elena crackers or low-fat cookies.   Artificial sweeteners such as aspartame (NutraSweet, Equal) and sucralose (Splenda) may help satisfy your sweet tooth while sparing the sugar. But remember that you still must use them sensibly. It's OK to swap a diet cola for a regular cola, but not in place of a more nutritious beverage such as low-fat milk or even plain water.   Cut back on added sugar, which has no nutritional value but can pack on calories.   DASH diet: Alcohol and caffeine  Drinking too  much alcohol can increase blood pressure. The DASH diet recommends that men limit alcohol to two or fewer drinks a day and women one or less.   The DASH diet doesn't address caffeine consumption. The influence of caffeine on blood pressure remains unclear. But caffeine can cause your blood pressure to rise at least temporarily. If you already have high blood pressure or if you think caffeine is affecting your blood pressure, talk to your doctor about your caffeine consumption.   The DASH diet is not designed to promote weight loss, but it can be used as part of an overall weight-loss strategy. The DASH diet is based on a diet of about 2,000 calories a day. If you're trying to lose weight, though, you may want to eat around 1,600 a day. You may need to adjust your serving goals based on your health or individual circumstances -- something your health care team can help you decide.   Tips to cut back on sodium  The foods at the core of the DASH diet are naturally low in sodium. So just by following the DASH diet, you're likely to reduce your sodium intake. You also can cut back on sodium in your diet by:   Using sodium-free spices or flavorings with your food instead of salt   Not adding salt when cooking rice, pasta or hot cereal   Rinsing canned foods to remove some of the sodium   Buying foods labeled \"no salt added,\" \"sodium-free,\" \"low sodium\" or \"very low sodium\"   One teaspoon of table salt has about 2,300 mg of sodium, and 2/3 teaspoon of table salt has about 1,500 mg of sodium. When you read food labels, you may be surprised at just how much sodium some processed foods contain. Even low-fat soups, canned vegetables, ready-to-eat cereals and sliced turkey from the local deli -- all foods you may have considered healthy -- often have lots of sodium.   You may not notice a difference in taste when you choose low-sodium food and beverages. If things seem too bland, gradually introduce low-sodium foods and cut  back on table salt until you reach your sodium goal. That'll give your palate time to adjust. It can take several weeks for your taste buds to get used to less salty foods.     [facility] is located in [address].  Monday, Tuesday & Friday - 8 a.m. to 4 p.m.  Wednesday, Thursday - 7 a.m. to 3 p.m.  The lab is closed daily from 12 p.m.-12:30 p.m.  Saturday lab hours by appointment. Call [phone] to schedule the appointment.   Please signup for [employer], which is electronic access to your record if you have not done so.  All your results will post on there.  [email or web address]   You can NOW use [employer]otigo to book your appointments with us, or consider using open access scheduling which is through the [employer] website [email or web address] and type in Ezra Jones MD and follow the links for \"Schedule Online Now\"    To schedule Imaging or tests at [place] call Central Scheduling [phone], Go to [facility] ER Building (For example: CT scans, X rays, Ultrasound, MRI)  Cardiac Testing in ER building Building A second floor Cardiac Testing [phone] (For example: Holter Monitor, Cardiac Stress tests,Event Monitor, or 2D Echocardiograms)  [facility] call [phone] usually in [facility]  Walk [facility] Clinic in [place] at [address] Mon-Fri at 8am-7:30 p.m., and Sat/Sun 9:00a.m.-4:30 p.m.  Also at [address] [place] [address]  Call [phone] for info     Please call our office about any questions regarding your treatment/medicines/tests as a result of today's visit.  For your safety, read the entire package insert of all medicines prescribed to you and be aware of all of the risks of treatment even beyond those discussed today.  All therapies have potential risk of harm or side effects or medication interactions.  It is your duty and for your safety to discuss with the pharmacist and our office with questions, and to notify us and stop treatment if problems arise, but know that  our intention is that the benefits outweigh those potential risks and we strive to make you healthier and to improve your quality of life.    Referrals, and Radiology Information:    If your insurance requires a referral to a specialist, please allow 5 business days to process your referral request.    If Ezra Jones MD orders a CT or MRI, it may take up to 10 business days to receive approval from your insurance company. Once our office has called informing you that the insurance company approved your testing, please call Central Scheduling at [phone]  Please allow our office 5 business days to contact you regarding any testing results.    Refill policies:   Allow 3 business days for refills; controlled substances may take longer and must be picked up from the office in person.  Narcotic medications can only be filled in 30 day increments and must be refilled at an office visit only.  If your prescription is due for a refill, you may be due for a follow-up appointment.  We cannot refill your maintenance medications at a preventative wellness visit.  To best provide you care, patients receiving maintenance medications need to be seen at least twice a year.

## 2024-01-08 NOTE — PROGRESS NOTES
Wellness Exam    REASON FOR VISIT:    Harleen Euceda is a 48 year old female who presents for an Annual Health Assessment.    Current Complaints: Ms. Euceda is here for her wellness exam  Flu shot: see immunization record  Health Maintenance Topics with due status: Overdue       Topic Date Due    Colorectal Cancer Screening Never done    COVID-19 Vaccine Never done    Pneumococcal Vaccine: Birth to 64yrs Never done    DTaP,Tdap,and Td Vaccines Never done    Influenza Vaccine Never done    Annual Physical 12/05/2023    Annual Depression Screening 01/01/2024     Reported Health:   She is a very pleasant 48-year-old female who is generally healthy but has been recently diagnosed with left breast cancer.  She will be seeing the oncologist tomorrow.  When she has seen her breast surgeon recently, her blood pressure was noted to be elevated but when she had seen her OB/GYN a few months ago this was normal.  Today's quite elevated.  It is unusual for her to have blood pressure like this. I had reviewed past medical and family histories together with allergy and medication lists documented.    Details about the complaints:  As abovementioned    General Health     How would you describe your current health state?: Good    Type of Diet: Balanced    How do you maintain positive mental well-being?: Social Interaction    How would you describe your daily physical activity?: Moderate    If you are a male age 45-79 or a female age 55-79, do you take aspirin?: No    Have you had any immunizations at another office such as Influenza, Hepatitis B, Tetanus, or Pneumococcal?: No    At any time do you feel concerned for the safety/well-being of yourself and/or your children, in your home or elsewhere?: No     CAGE:     Cut: Have you ever felt you should Cut down on your drinking?: No    Annoyed: Have people Annoyed you by criticizing your drinking?: No    Guilty: Have you ever felt bad or Guilty about your drinking?: No    Eye Opener:  Have you ever had a drink first thing in the morning to steady your nerves or to get rid of a hangover (Eye opener)?: No    Scoring  Total Score: 0       PHQ-4: Over the LAST 2 WEEKS       Depression Screening (PHQ-2/PHQ-9): Over the LAST 2 WEEKS   Little interest or pleasure in doing things (over the last two weeks)?: Not at all    Feeling down, depressed, or hopeless (over the last two weeks)?: Not at all    PHQ-2 SCORE: 0              PREVENTATIVE SERVICES  INDICATIONS AND SCHEDULE Recommendation Internal Lab or Procedure External Lab or Procedure   Breast Cancer Screening   Every 2 yrs age 50-74 Health Maintenance   Topic Date Due    Mammogram  01/04/2025       Pap Every 3 yrs age 21-65 or Pap and HPV every 5 yrs age 30-65 Health Maintenance   Topic Date Due    Pap Smear  11/20/2026       Chlamydia Screening Screen Annually age<25, if sex active/on OCPs; >24 high risk No results found for: \"CHLAMYDIA\"    Colonoscopy Screen Every 10 years Health Maintenance   Topic Date Due    Colorectal Cancer Screening  Never done       Flex Sigmoidoscopy Screen  Every 5 years No results found for this or any previous visit.    Fecal Occult Blood  Annually No results found for: \"FOB\", \"OCCULTSTOOL\"    Obesity Screening Screen all adults annually Body mass index is 27.81 kg/m².      Preventive Services for Which Recommendations Vary with Risk Recommendation Internal Lab or Procedure External Lab or Procedure   Cholesterol Screening Recommended screening varies with age, risk and gender LDL Cholesterol (mg/dL)   Date Value   09/18/2020 103 (H)     LDL-CHOLESTEROL (mg/dL (calc))   Date Value   08/07/2015 106       Diabetes Screening  if history of high blood pressure or other  risk factors HEMOGLOBIN A1c (% of total Hgb)   Date Value   08/07/2015 5.4     Glucose (mg/dL)   Date Value   09/18/2020 92     GLUCOSE (mg/dL)   Date Value   08/07/2015 90         Gonorrhea Screening if high risk No results found for: \"GONOCOCCUS\"    HIV  Screening For all adults age 18-65, older adults at increased risk HIV Antigen Antibody Combo (no units)   Date Value   2020 Non-Reactive       Syphilis Screening Screen if pregnant or high risk No results found for: \"RPR\"    Hepatitis C Screening Screen those at high risk plus screen one time for adults born 1943-1 965 Hepatitis C Virus (no units)   Date Value   2020 Nonreactive       Tuberculosis Screen if high risk No components found for: \"PPDINDURAT\"      ALLERGIES:     Allergies   Allergen Reactions    Other SHORTNESS OF BREATH     Peonies and lemongrass.         CURRENT MEDICATIONS:   No current outpatient medications on file.      MEDICAL INFORMATION:   Past Medical History:   Diagnosis Date    Fibroadenoma of left breast 2015    Varicose vein 2015      Past Surgical History:   Procedure Laterality Date      ,,    EXTRACTION ERUPTED TOOTH/EXR      wisdom teeth    INSERT INTRAUTERINE DEVICE      Mirena insertion    OTHER SURGICAL HISTORY Bilateral 2018    RLTCS with bilateral Salpingectomies    REMOVE INTRAUTERINE DEVICE      Mirena removal      Family History   Problem Relation Age of Onset    Breast Cancer Mother 46    Hypertension Mother     Psychiatric Mother         depression    Hypertension Father     Diabetes Father     Heart Disorder Father 52        quad bypass    Other (hypothyroid) Father     Other (Cholangiocarinoma) Brother 36    Breast Cancer Maternal Grandmother 63      SOCIAL HISTORY:   Social History     Socioeconomic History    Marital status:    Tobacco Use    Smoking status: Never    Smokeless tobacco: Never   Substance and Sexual Activity    Alcohol use: No     Comment: 7-10 drinks a week, not during pregnancy    Drug use: No   Other Topics Concern    Caffeine Concern Yes     Comment: 3 cups of coffee daily    Exercise No          REVIEW OF SYSTEMS:   Constitutional: Negative for fever, chills and fatigue.   HENT: Negative  for hearing loss, congestion, sore throat and neck pain.    Eyes: Negative for pain and visual disturbance.   Respiratory: Negative for cough and shortness of breath.    Cardiovascular: Negative for chest pain and palpitations.   Gastrointestinal: Negative for nausea, vomiting, abdominal pain and diarrhea.   Genitourinary: Negative for urgency, frequency and difficulty urinating.   Musculoskeletal: Negative for arthralgias and no gait problem.   Skin: Negative for color change and rash.   Neurological: Negative for tremors, weakness and numbness.   Hematological: Negative for adenopathy. Does not bruise/bleed easily.   Psychiatric/Behavioral: Negative for confusion and agitation. The patient is not nervous/anxious.    EXAM:   BP (!) 158/100   Pulse 88   Temp 97.6 °F (36.4 °C) (Temporal)   Resp 16   Ht 5' 3\" (1.6 m)   Wt 157 lb (71.2 kg)   LMP 12/23/2023   SpO2 98%   BMI 27.81 kg/m²    Patient's last menstrual period was 12/23/2023.   Constitutional: She appears her stated age, nourished, and pleasant. Vital signs reviewed as noted  Head: Normocephalic and atraumatic.   Nose: Nose normal.   Eyes: EOM are normal. Pupils are equal, round, and reactive to light. No scleral icterus.   Neck: Normal range of motion. No thyromegaly present.   Cardiovascular: Normal rate, regular rhythm and normal heart sounds.  Exam reveals no friction rub.    No murmur heard.  Pulmonary/Chest: Effort normal and breath sounds normal. She has no wheezes. She has no rales.   Abdominal: Soft. Bowel sounds are normal. There is no tenderness.   Musculoskeletal: Normal range of motion. She exhibits no edema.   Lymphadenopathy:    She has no cervical adenopathy or supraclavicular adenopathy.   Neurological: She is alert and oriented to person, place, and time. She has normal reflexes.   Skin: Skin is warm. No rash noted. No erythema. with normal hair  Psychiatric: She has a normal mood and affect and her behavior is normal.     ASSESSMENT  AND OTHER RELEVANT CHRONIC CONDITIONS:   Harleen Euceda is a 48 year old female who presents for an Annual Health Assessment.   1. Wellness examination    2. Malignant neoplasm of upper-outer quadrant of left breast in female, estrogen receptor positive  (HCC)    3. Elevated blood pressure reading        PLAN SUMMARY:   Harleen Euceda is a 48 year old female  Age appropriate cancer screening, labs, safety, immunizations were discussed with the patient and ordered as follows:    Harleen was seen today for blood pressure.    Diagnoses and all orders for this visit:    Wellness examination    Malignant neoplasm of upper-outer quadrant of left breast in female, estrogen receptor positive  (HCC)  -Await recommendations from oncology  Elevated blood pressure reading  -Likely due to underlying stress and anxiety  - She and I agreed that we will monitor blood pressure for now.  Try to do some relaxation and meditative techniques if possible.  However I did ask her to give us an update on her blood pressure levels.    Call or come in sooner if symptoms worsen or persist.      This note was prepared using Dragon Medical voice recognition dictation software. As a result errors may occur. When identified these errors have been corrected. While every attempt is made to correct errors during dictation discrepancies may still exist              No orders of the defined types were placed in this encounter.      Imaging & Consults:  None    Her 5 year prevention plan includes: annual visits for fasting labs  Health Maintenance   Topic Date Due    Colorectal Cancer Screening  Never done    COVID-19 Vaccine (1) Never done    Pneumococcal Vaccine: Birth to 64yrs (1 - PCV) Never done    DTaP,Tdap,and Td Vaccines (1 - Tdap) Never done    Influenza Vaccine (1) Never done    Annual Physical  12/05/2023    Annual Depression Screening  01/01/2024    HTN: BP Follow-Up  02/08/2024    Mammogram  01/04/2025    Pap Smear  11/20/2026      Patient/Caregiver Education:  Patient/Caregiver Education: There are no barriers to learning. Medical education done.  Outcome: Patient verbalizes understanding.    Educated by: MD     The patient indicates understanding of these issues and agrees to the plan.    SUGGESTED VACCINATIONS - Influenza, Pneumococcal, Zoster, Tetanus     There is no immunization history on file for this patient.    Influenza Annually   Pneumococcal if high risk   Td/Tdap once then every 10 years   HPV Females 11-26: 3 doses   Zoster (Shingles) 60 and older: one dose   Varicella 2 doses if not immune   MMR 1-2 doses if born after 1954 and not immune     Patient Active Problem List   Diagnosis    Fibroadenoma of left breast    Varicose vein    Arthritis    Malignant neoplasm of upper-outer quadrant of left breast in female, estrogen receptor positive  (HCC)     PREVENTIVE VISIT,EST,40-64

## 2024-01-08 NOTE — PROGRESS NOTES
Referring Provider:  Caro Nicole MD    Additional Provider(s):  MD Slick Cabral MD    Reason for Referral:  Harleen Euceda was referred for genetic counseling because of a new diagnosis of breast cancer. Ms. Euceda is a 48 year-old woman of [place] and [place] descent who was diagnosed with an ER/OK-positive, HER2-negative invasive lobular carcinoma of the left breast on 23. Her ovaries are intact. She has not had a colonoscopy.    Social History:  Ms. Euceda was seen today by herself. Ms. Euceda lives in [place].     Family History:   A three generation pedigree was obtained.      Ms. Euceda has two sons, ages 14 and 15, and an almost five year-old daughter.     Ms. Euceda had one brother and has two paternal half-brothers. Ms. Euceda's brother  from cholangiocarcinoma at age 37.     Ms. Euceda's mother had breast cancer at age 47. Ms. Euceda's mother has two brothers and one maternal half-brother, none of whom have had cancer. Ms. Euceda's maternal grandmother had breast cancer at age 63 and  in her 70s. Ms. Euceda's maternal grandfather  at age 80 and did not have cancer.     Ms. Euceda's father is 67 years-old and has not had cancer; however, Ms. Euceda's father had a thyroidectomy at age 19 or 20 for benign nodules and a unilateral nephrectomy in his 50s for what turned out to be a benign renal lesion. Ms. Euceda's father has one brother, one sister, and a paternal half-brother, none of whom have had cancer. Ms. Euceda's paternal grandparents  at unspecified ages and did not have cancer.     Please see the pedigree for additional family history information.     Counseling:   The following information was discussed with Ms. Euceda.    Genetics of Cancer:  The majority of cancers are sporadic whereas approximately 10-30% of cases of cancer cases are attributed to familial factors such as unidentified low penetrance genes in the family or shared environmental factors.  Approximately 5-10% of  cancers are related to a hereditary cancer syndrome.  Signs of a hereditary cancer syndrome include some rare cancers, common cancers occurring at unusually young ages, multiple primary cancers in the some individual, or the same type of cancer or related cancers (e.g., breast and ovarian, colorectal and endometrial) in three or more individuals in the same lineage.      Risk Assessment:   Ms. Euceda meets NCCN Guidelines testing criteria for high-penetrance breast cancer susceptibility genes. Risk assessment to estimate the chance of Ms. Euceda harboring a BRCA1/2 pathogenic variant was done by using the [place] II Model. Based on this model, Ms. Euceda's risk of carrying a BRCA1/2 pathogenic variant is estimated to be 11%. It is important to note that all prediction models have limitations and medical management should be based on clinical judgment, and personal and family history. In addition, there are no prediction models or testing criteria for moderate penetrance cancer predisposition genes such as JOHN and CHEK2. I recommend that testing be performed as part of a multigene panel.     Genetic Testing (Panel):  The pros, cons, and limitations of genetic testing were discussed including the potential implications of test results on clinical management.     If a pathogenic variant is not identified (negative result), it is still possible that Ms. Euceda has a pathogenic variant in one of these genes that was not detected by the genetic test, or that the family is dealing with a hereditary cancer syndrome involving a different gene. It is also possible that Ms. Euceda's relatives have a pathogenic variant in one of these genes that Ms. Euceda did not inherit. In this scenario, options for cancer screening/management should be determined according to personal and family histories and should be discussed with a physician.      A variant of uncertain significance is a DNA change that may or may not alter the function of the gene;  therefore, it is usually not possible to determine if the gene variant is responsible for an individual's increased cancer risk.     If Ms. Euceda is found to carry a pathogenic variant in a cancer predisposition gene, she is at significantly increased risk for various cancers. The magnitude of these risks, and the cancers for which she is at increased risk would depend on the gene involved. Medical recommendations for individuals with BRCA1/2 pathogenic variants were reviewed as an example. It was also explained that for some of the genes for which testing is available, the associated cancer risks have yet to be determined and medical management recommendations may not yet be available for individuals with pathogenic variants in these genes. If she were to test positive for a pathogenic variant, her children and siblings would each have a 50% chance of carrying the same variant. At-risk adults (>18) would have the option of pursuing targeted genetic testing to clarify their cancer risks. Genetic test results have implications for the entire biological family. Thus, it is recommended that she share her genetic test results with her biological family members so that they may have their risk assessed.     Genetic Information Non-Discrimination Act:  The legal protections of the Genetic Information Nondiscrimination Act (SUSANA) for health insurance and employment were discussed.  SUSANA does not provide protection for life insurance, disability or long-term care insurance.    Summary and Plan:  Ms. Euceda was referred for genetic counseling because of a new diagnosis of breast cancer. Her reported family history is suspicious for a hereditary cancer syndrome. Genetic testing on Ms. Euceda for BRCA1/2 pathogenic variants as part of a multigene panel is indicated.      At the conclusion of the counseling session Ms. Euceda decided to proceed with genetic testing. Written consent was obtained. Blood and paperwork were sent to  [facility] for their Breast Cancer STAT panel (JOHN, BRCA1, BRCA2, CDH1, CHEK2, PALB2, PTEN, STK11, and TP53). I anticipate that Ms. Euceda's results will be available within 6-13 days and will call her with the results.  Results will also be communicated to Dr. Nicole, Dr. Jones, and Dr. Jama.    Approximately 35 minutes was spent in consultation with Ms. Euceda.

## 2024-01-09 ENCOUNTER — TELEPHONE (OUTPATIENT)
Facility: LOCATION | Age: 49
End: 2024-01-09

## 2024-01-09 ENCOUNTER — TELEPHONE (OUTPATIENT)
Dept: HEMATOLOGY/ONCOLOGY | Facility: HOSPITAL | Age: 49
End: 2024-01-09

## 2024-01-09 ENCOUNTER — NURSE NAVIGATOR ENCOUNTER (OUTPATIENT)
Dept: HEMATOLOGY/ONCOLOGY | Facility: HOSPITAL | Age: 49
End: 2024-01-09

## 2024-01-09 ENCOUNTER — OFFICE VISIT (OUTPATIENT)
Dept: HEMATOLOGY/ONCOLOGY | Facility: HOSPITAL | Age: 49
End: 2024-01-09
Attending: SURGERY
Payer: COMMERCIAL

## 2024-01-09 VITALS
HEART RATE: 53 BPM | DIASTOLIC BLOOD PRESSURE: 94 MMHG | RESPIRATION RATE: 16 BRPM | WEIGHT: 157.5 LBS | SYSTOLIC BLOOD PRESSURE: 156 MMHG | BODY MASS INDEX: 28 KG/M2 | OXYGEN SATURATION: 100 % | TEMPERATURE: 98 F

## 2024-01-09 DIAGNOSIS — C50.812 MALIGNANT NEOPLASM OF OVERLAPPING SITES OF LEFT BREAST IN FEMALE, ESTROGEN RECEPTOR POSITIVE  (HCC): Primary | ICD-10-CM

## 2024-01-09 DIAGNOSIS — Z17.0 MALIGNANT NEOPLASM OF OVERLAPPING SITES OF LEFT BREAST IN FEMALE, ESTROGEN RECEPTOR POSITIVE  (HCC): Primary | ICD-10-CM

## 2024-01-09 PROCEDURE — 99245 OFF/OP CONSLTJ NEW/EST HI 55: CPT | Performed by: INTERNAL MEDICINE

## 2024-01-09 NOTE — TELEPHONE ENCOUNTER
I called and left a detailed voice message stating that we reviewed her images in conference today.  It does appear that she has about 11 cm of enhancement.  Because of the large size of the enhancement, I am recommending a mastectomy.  I will ask the breast navigators to help coordinate an appointment with plastic surgery.  Once she meets with plastic surgery, we can begin to coordinate surgical dates.    I asked her to call with any further questions.

## 2024-01-09 NOTE — PROGRESS NOTES
Patient is here for consultation for breast cancer.  She had a routine mammogram that showed abnormalities.  She has had biopsy and subsequent MRI.    She is here to discuss next steps.   She denies any fever, cough or shortness of breath.  She is eating well.  She has been more tired recently.   She denies any pain.      Education Record    Learner:  Patient and Family Member    Disease / Diagnosis: breast cancer    Barriers / Limitations:  None   Comments:    Method:  Discussion   Comments:    General Topics:  Plan of care reviewed   Comments:    Outcome:  Shows understanding   Comments:

## 2024-01-09 NOTE — TELEPHONE ENCOUNTER
Called patient and notified her of the CT and bone scan I helped her schedule for Monday January 15, 2024 at [facility]. Stated to the patient that her CT scan will be at the [facility] at 1100 and to drink the oral contrast that was provided prior and then she will go to the [facility] at 1200 for her bone scan. She will meet with Laura at the [facility] at 1300 for her appointment and then at 1500 she will proceed back to the [facility] to check in with nuclear medicine for her bone scan. I stated I received a message from the plastics department that they are working on an appointment for her and will contact her once they have one. She thanked me for the phone call and assistance. Pt was provided with the breast nurse navigators contact information and was encouraged to phone with any other questions or concerns.

## 2024-01-09 NOTE — PROGRESS NOTES
Met with patient and her family member in Dr. May's clinic. Discussed with her the tests that Dr. May ordered for her, bone scan and CT scan, and offered my assistance with scheduling and she accepted. I stated I would contact her with date, time, and location of ordered tests. She stated she has fasting labs from her PCP to get done and Dr. May ordered labs as well and she will arrange for those labs to be done at that time. Discussed Dr. Nicole's message about the multidisciplinary conference meeting that we reviewed her breast MRI results and that Dr. Nicole is recommending a mastectomy. I offered to help with a plastic surgeon appointment and briefly discussed unilateral vs bilateral mastectomy, types of reconstruction options to expect at plastic surgeon consultation, and how she told her son about her breast cancer. Verbal and emotional support provided. She requested she would like to be called with results of CT and bone scan. She thanked me for assistance and was provided with the breast nurse navigators contact information and was encouraged to phone with any other questions or concerns.

## 2024-01-10 NOTE — CONSULTS
Cancer Center Report of Consultation    Patient Name: Harleen Euceda   YOB: 1975   Medical Record Number: [id number]   CSN: [id number]   Consulting Physician: Deejay May MD  Referring Physician(s): No ref. provider found  Date of Consultation: 2024     Reason for Consultation:  Harleen Euceda was seen today in the Cancer Center for evaluation and management of left breast cancer.    History of Present Illness:     48 year old woman had a screening mammogram on 2023 that showed asymmetry in the left breast. She had diagnostic mammogram and US on 2023 that showed a 5 x 4 x 5 mm nodule. She had US biopsy on 2023 that showed invasive lobular carcinoma, grade II with %, %, Ki-67 12% and Her2 2+ IHC; FISH not amplified. She had an MRI of the breasts on 2024 that showed more extensive abnormality in the left breast with an 11 cm area of enhancement. There are no abnormal appearing axillary nodes.     She has been otherwise well. She has no pain. She has no dyspnea or cough. She has no fever or sweats. She has no other complaints.    Past Medical History:  Past Medical History:   Diagnosis Date    Fibroadenoma of left breast 2015    Varicose vein 2015       Past Surgical History:  Past Surgical History:   Procedure Laterality Date      ,,    EXTRACTION ERUPTED TOOTH/EXR      wisdom teeth    INSERT INTRAUTERINE DEVICE      Mirena insertion    OTHER SURGICAL HISTORY Bilateral 2018    RLTCS with bilateral Salpingectomies    REMOVE INTRAUTERINE DEVICE      Mirena removal       Family Medical History:  Family History   Problem Relation Age of Onset    Breast Cancer Mother 46    Hypertension Mother     Psychiatric Mother         depression    Hypertension Father     Diabetes Father     Heart Disorder Father 52        quad bypass    Other (hypothyroid) Father     Other (Cholangiocarinoma) Brother 36    Breast Cancer Maternal  Grandmother 63       Gyne History:  OB History    Para Term  AB Living   3 3 3 0 0 3   SAB IAB Ectopic Multiple Live Births   0 0 0 0 3   Obstetric Comments   Menarche 14   1st child at age 32       Psychosocial History:  Social History     Socioeconomic History    Marital status:      Spouse name: Not on file    Number of children: Not on file    Years of education: Not on file    Highest education level: Not on file   Occupational History    Not on file   Tobacco Use    Smoking status: Never    Smokeless tobacco: Never   Substance and Sexual Activity    Alcohol use: No     Comment: 7-10 drinks a week, not during pregnancy    Drug use: No    Sexual activity: Not on file   Other Topics Concern     Service Not Asked    Blood Transfusions Not Asked    Caffeine Concern Yes     Comment: 3 cups of coffee daily    Occupational Exposure Not Asked    Hobby Hazards Not Asked    Sleep Concern Not Asked    Stress Concern Not Asked    Weight Concern Not Asked    Special Diet Not Asked    Back Care Not Asked    Exercise No    Bike Helmet Not Asked    Seat Belt Not Asked    Self-Exams Not Asked   Social History Narrative    Not on file     Social Determinants of Health     Financial Resource Strain: Not on file   Food Insecurity: Not on file   Transportation Needs: Not on file   Physical Activity: Not on file   Stress: Not on file   Social Connections: Not on file   Housing Stability: Not on file       Allergies:   Allergies   Allergen Reactions    Other SHORTNESS OF BREATH     Peonies and lemongrass.         Current Medications:  No current outpatient medications on file.    Review of Systems:    Constitutional: Negative for anorexia, fatigue, fevers, chills, night sweats and weight loss.  Eyes: Negative for visual disturbance, irritation and redness.  Respiratory: Negative for cough, hemoptysis, chest pain, or dyspnea.  Cardiovascular: Negative for angina, orthopnea or  palpitations.  Gastrointestinal: Negative for nausea, vomiting, change in bowel habits, diarrhea, constipation and abdominal pain.  Integument/breast: Negative for rash, skin lesions, and pruritus.  Hematologic/lymphatic: Negative for easy bruising, bleeding, and lymphadenopathy.  Musculoskeletal: Negative for myalgias, arthralgias, muscle weakness.  Genitourinary: Negative for dysuria or hematuria  Neurological: Negative for headaches, dizziness, speech problems, gait problems and focal weakness.  Psychiatric: The patient's mood was calm and appropriate for this visit.    The pertinent positives and negatives were described in the HPI and above. All other systems were negative.      Vital Signs:  Height: --  Weight: 71.4 kg (157 lb 8 oz) (01/09 1356)  BSA (Calculated - sq m): --  Pulse: 53 (01/09 1356)  BP: 156/94 (01/09 1356)  Temp: 97.8 °F (36.6 °C) (01/09 1356)  Do Not Use - Resp Rate: --  SpO2: 100 % (01/09 1356)    Physical Examination:    Constitutional: Patient is alert and oriented x 3, not in acute distress.  HEENT:  Oropharynx is clear. Neck is supple.  Eyes: Anicteric sclera. Pink conjunctiva.  Respiratory: Clear to auscultation and percussion. No rales.  No wheezes.  Cardiovascular: Regular rate and rhythm.   Breast: the right breast has no mass. The left breast has no mass but is asymmetric, larger than the right.  Gastrointestinal: Soft, non tender with good bowel sounds.  Extremities: No edema. No calf tenderness.  Neurological: Grossly intact without focal motor or sensory deficit.  Lymphatics: There is no palpable lymphadenopathy throughout in the cervical, supraclavicular, or axillary regions.    Labs reviewed at this visit:  Lab Results   Component Value Date    WBC 12.1 (H) 09/18/2020    RBC 4.90 09/18/2020    HGB 15.5 09/18/2020    HCT 46.3 09/18/2020    MCV 94.5 09/18/2020    MCH 31.6 09/18/2020    MCHC 33.5 09/18/2020    RDW 13.4 09/18/2020    .0 09/18/2020     Lab Results   Component  Value Date     09/18/2020    K 4.2 09/18/2020     09/18/2020    CO2 27.0 09/18/2020    BUN 8 09/18/2020    CREATSERUM 1.06 (H) 09/18/2020    GLU 92 09/18/2020    CA 10.0 09/18/2020    ALKPHO 73 09/18/2020    ALT 26 09/18/2020    AST 13 (L) 09/18/2020    BILT 0.6 09/18/2020    ALB 4.1 09/18/2020    TP 7.8 09/18/2020        Radiologic imaging reviewed at this visit:    MRI Breast on 1/4/2024:  FINDINGS:  Minimal background parenchymal enhancement is noted.     Extensive segmental enhancement in the outer left breast is noted measuring up to 11.1 centimeters in length (series 80,151 image 83) by 4.6 centimeters in craniocaudal dimension by 3.2 centimeters in transverse dimension (series [id number], image 129).    Enhancement extends from the left axillary tail to the retroareolar region.     No abnormal enhancement is noted in the right breast.     A lymph node in the left axillary tail is demonstrates a fatty hilum measuring up to 1.2 x 0.5 cm (image 96) which is nonspecific.  Additional subcentimeter lymph nodes are noted including a 2nd left axillary lymph node measuring up to 0.8 x 0.6 cm  (series [id number], image 58).  No abnormal internal mammary adenopathy is noted.  Benign appearing right axilla lymph nodes are present.     Limited views of the anterior chest wall and upper abdomen are within normal limits.     Impression   CONCLUSION:    1. Irregular segmental enhancement in the outer left breast is suspicious for neoplasm.  Surgical and medical consultation is advised.     DIAGNOSTIC CATEGORY 6--KNOWN BIOPSY PROVEN MALIGNANCY: LEFT BREAST         Assessment/Plan:    Invasive lobular carcinoma of the overlapping quadrants of the left breast:  Clincial T3 disease with 11 cm of MRI abnormality  Clinical N0 disease  %  %  Ki-67 12%  Her2 2+ IHC  Her2 not amplied FISH    I had a long discussion with the patient about her work up. She has extensive abnormality in the left breast. She will need a left  breast mastectomy. She has seen Dr. Keturah Nicole and she will see plastic surgery. I would recommend metastatic work up with CBC and CMP. She will also get a CT of the CAP and a bone scan. If this is negative then she can proceed with surgery. Based on the final pathology we will decide on further management. She will have nurys sampling. We will also find out on pathology whether there is one large disease or whether this is multifocal smaller disease. I did review the option of endocrine therapy with tamoxifen or OFS/AI. I also discussed the issue of chemotherapy.    This visit lasted 70 minutes with 20 minutes for history and physical exam, 35 minutes for management discussion, 5 minutes for previsit discussion of patient with Dr. Nicole and radiology, Dr. Joseph, and 10 minutes for post visit charting.        Deejay May MD

## 2024-01-12 ENCOUNTER — OFFICE VISIT (OUTPATIENT)
Dept: SURGERY | Facility: CLINIC | Age: 49
End: 2024-01-12
Payer: COMMERCIAL

## 2024-01-12 VITALS
TEMPERATURE: 98 F | HEIGHT: 62.21 IN | RESPIRATION RATE: 16 BRPM | DIASTOLIC BLOOD PRESSURE: 98 MMHG | OXYGEN SATURATION: 99 % | BODY MASS INDEX: 28.67 KG/M2 | SYSTOLIC BLOOD PRESSURE: 150 MMHG | WEIGHT: 157.81 LBS | HEART RATE: 67 BPM

## 2024-01-12 DIAGNOSIS — C50.412 MALIGNANT NEOPLASM OF UPPER-OUTER QUADRANT OF LEFT BREAST IN FEMALE, ESTROGEN RECEPTOR POSITIVE  (HCC): Primary | ICD-10-CM

## 2024-01-12 DIAGNOSIS — Z17.0 MALIGNANT NEOPLASM OF UPPER-OUTER QUADRANT OF LEFT BREAST IN FEMALE, ESTROGEN RECEPTOR POSITIVE  (HCC): Primary | ICD-10-CM

## 2024-01-12 PROCEDURE — 3080F DIAST BP >= 90 MM HG: CPT | Performed by: SURGERY

## 2024-01-12 PROCEDURE — 99243 OFF/OP CNSLTJ NEW/EST LOW 30: CPT | Performed by: SURGERY

## 2024-01-12 PROCEDURE — 3077F SYST BP >= 140 MM HG: CPT | Performed by: SURGERY

## 2024-01-12 PROCEDURE — 3008F BODY MASS INDEX DOCD: CPT | Performed by: SURGERY

## 2024-01-12 NOTE — PATIENT INSTRUCTIONS
Surgeon:         Dr. Brandon Alonzo                                        Tel:         [phone]                                  Fax:        [phone]    Surgery/Procedure: Bilateral breast skin envelope reduction. 1.5 hours, general anesthesia, outpatient. Joint with Dr. Nicole. Outpatient  Please request pre-op pec block per anesthesia.      Dx Code: C50.412    Hospital:  [facility]: [address] [place] [id number]           [phone]    1. Someone will need to drive you to and from the hospital if your procedure is outpatient.    2.Do not drink alcohol or smoke 24 hours prior to your procedure.    3. Bring a picture ID and your insurance card.    4. You will be contacted by the hospital the day before to confirm the procedure time and location.     5. Do not take any herbal supplements or blood thinners at least one week before your procedure/surgery. This includes NSAID's (aspirin, baby aspirin, Motrin, Ibuprofen, Aleve, Advil, Naproxen, etc), Plavix, fish oil, vitamin E, turmeric, CoQ10, or green tea supplements, etc. *TYLENOL or acetaminophen is ok to take*    6. PRE-OPERATIVE TESTING: History and physical with medical clearance is REQUIRED within 30 days of the surgery date and is mandatory per Dr. Alonzo. *If this is not done, your surgery will be postponed*  MEDICAL CLEARANCE WITH DR. Jones  CBC  CMP  EKG (within 90 days)    7. If you take Coumadin, Plavix, Xarelto, or Eliquis, please contact your prescribing physician for special instructions on how long to hold. If you take insulin, contact your primary care physician for special instructions.     8. Please inform us if you start or change any medications at least one week before surgery (ex: blood thinners, weight loss medications, diabetic medications, herbal supplements, etc)    9. Does patient have diagnosis of sleep apnea?    [   ] Yes     [  X   ]  No    Consent obtained  Photos taken on 1/12/2024

## 2024-01-12 NOTE — CONSULTS
New Patient Consultation    This is the first visit for this 48 year old female who presents to discuss reconstructive options following surgery for breast cancer.    History of Present Illness:   The patient is a 48 year old female who presents with a left breast cancer found by radiographic abnormality.  The patient ultimately underwent biopsy which revealed invasive lobular carcinoma.  She does not have breast pain. She does have family history of breast cancer in her maternal grandmother and mother. She does not have significant past history for breast diseases or breast surgery.  She wears a 34 D bra.  The patient was seen by Dr. Nicole and has elected to undergo a bilateral mastectomy..  She is here today to discuss post-mastectomy reconstructive options.  She is dieting between no reconstruction versus autologous reconstruction and is not interested in implant-based reconstruction.    Past Medical History:      Past Medical History:   Diagnosis Date    Breast cancer (HCC)     Fibroadenoma of left breast 2015    Varicose vein 2015         Past Surgical History:  Past Surgical History:   Procedure Laterality Date      ,,    EXTRACTION ERUPTED TOOTH/EXR      wisdom teeth    INSERT INTRAUTERINE DEVICE      Mirena insertion    OTHER SURGICAL HISTORY Bilateral 2018    RLTCS with bilateral Salpingectomies    REMOVE INTRAUTERINE DEVICE      Mirena removal    SKIN SURGERY           Medications:    No outpatient medications have been marked as taking for the 24 encounter (Office Visit) with Brandon Alonzo MD.         Allergies:    Allergies   Allergen Reactions    Other SHORTNESS OF BREATH     Peonies and lemongrass.           Family History:   Family History   Problem Relation Age of Onset    Hypertension Father     Diabetes Father     Heart Disorder Father 52        quad bypass    Other (hypothyroid) Father     Breast Cancer Mother 46    Hypertension Mother      Psychiatric Mother         depression    Breast Cancer Maternal Grandmother 63    Other (Cholangiocarinoma) Brother 36         Social History:  History   Alcohol Use    6.0 standard drinks of alcohol/week    6 Standard drinks or equivalent per week     Comment: 7-10 drinks a week, not during pregnancy       History   Smoking Status    Never   Smokeless Tobacco    Never       History   Drug Use No           Review of Systems:    General:   The patient denies, fever, chills, night sweats, fatigue, generalized weakness, change in appetite, weight loss, or weight gain.    Endocrine:  There is no history of poor/slow wound healing, weight loss/gain, fertility or hormone problems, cold intolerance, heat intolerance, excessive thirst, or thyroid disease.      Allergic/Immunologic:  There is no history of hives, hay fever, angioedema or anaphylaxis.    HEENT:  The patient denies ear pain, ear drainage, hearing loss, change in vision, double vision, cataracts, glaucoma, nasal congestion, nosebleed, hoarseness, sore throat, or swollen glands.    Respiratory:  The patient denies shortness of breath, cough, bloody cough, phlegm, asthma, or wheezing.    Cardiovascular:   The patient denies chest pain/pressure, palpitations, irregular heartbeat, high blood pressure, stroke, or leg swelling.    Breasts:  The patient denies breast masses, pain, change in the breast skin, skin dimpling, nipple discharge, or rash.    Gastrointestinal:  There is no history of difficulty or pain with swallowing, reflux symptoms, nausea, vomiting, dark/bloody stools, diarrhea, constipation, change in bowel habits, or abdominal pain.    Genitourinary:  The patient denies frequent urination, needing to get up at night to urinate, urinary hesitancy or retaining urine, painful urination, urinary incontinence, decreased urine stream, blood in urine, or vaginal/penile discharge.    Skin:  Then patient denies rash, itching, skin lesions, dry skin, change in  skin color or change in moles, sunburn with blistering.    Hematologic/Lymphatic:  The patient denies easily bruising or bleeding, persistent swollen glands or lymph nodes, bleeding disorders, blood clots, or pulmonary embolism.    Gynecologic:  The patient denies irregular menses, pelvic pain, pain with intercourse, painful menses, or pregnancy.    Musculoskeletal:  The patient denies muscle aches/pain, joint pain, stiff joints, neck pain, back pain or bone pain.    Neurologic:  There is no history of migraines or severe headaches, seizure/epilepsy, speech problems, coordination problems, trembling/tremors, fainting/black outs, dizziness, memory problems, loss of sensation/numbness, problems walking, weakness, tingling or burning in hands/feet.     Psychiatric:  There is no history of abusive relationship, bipolar disorder, sleep disturbance, anxiety, depression or feeling of despair.     Physical Exam:    BP (!) 150/98 (BP Location: Right arm, Patient Position: Sitting)   Pulse 67   Temp 97.9 °F (36.6 °C) (Tympanic)   Resp 16   Ht 1.58 m (5' 2.21\")   Wt 71.6 kg (157 lb 12.8 oz)   LMP 12/23/2023   SpO2 99%   BMI 28.67 kg/m²     The patient is awake,  alert, and oriented.  She is well-nourished, well-developed woman who appears her stated age. Her speech patterns and movements are normal. Her affect is appropriate.    HEENT: The head is normocephalic. The neck is supple. The thyroid is not enlarged and is without palpable masses.  The trachea is in the midline. Conjunctiva are clear, non-icteric.    Right breast: Grade 2 ptosis is noted.  Striae are noted.  Measurements: sternal notch to nipple 37cm, nipple to inframammary fold 12cm, base diameter 15cm.  The skin, nipple, and areola appear normal.  There is no nipple retraction or discharge.  There are no dominant masses in the breast.     Left breast:  Grade 2 ptosis is noted.  Striae are noted.  Measurements:  sternal notch to nipple 27cm, nipple to  inframammary fold 12cm, base diameter 15cm.  The skin, nipple, and areola appear normal.  There is no nipple retraction or discharge.  There are no dominant masses in the breast.      Abdomen:  A moderate abdominal pannus with striae is noted.  A reducible approximately 5 mm umbilical hernia is noted.  A transverse scar is noted without palpable hernia.    Lymph Nodes:  There is no cervical, supraclavicular, or axillary lymphadenopathy appreciated.    Back: There is no vertebral column tenderness.    Skin: The skin appears normal. There are no suspicious appearing rashes or lesions.    Extremities: The extremities are without deformity, cyanosis or edema.    Impression:   The patient is a 48 year old female who presents with a left breast cancer is awaiting bilateral mastectomy.     Discussion and Plan:  We reviewed the options for post-mastectomy reconstruction and discussed the risks/benefits of timing (immediate versus delayed) and technique (implant-based versus autologous).  Given the patient's desires, the majority of discussion was focused on autologous reconstruction. The nature and technique of breast reconstruction with abdominal free flap was reviewed with the patient. We reviewed the variations of abdominal free flap reconstruction including KIMBERLEY flap, SIEA flap, muscle-sparing free TRAM flap, and free TRAM flap. Representative illustrations and photographs were demonstrated to the patient. The risks of surgery including but not limited to bleeding, infection, scarring, seroma, delayed wound healing, partial/total flap loss, fat necrosis, asymmetry, injury to adjacent structures, abdominal hernia/weakness/bulge, need for further surgery, and venous thromboembolism were reviewed. The expected post-operative course was discussed including the need for activity limitation, drains, and suture removal. Finally, we discussed the possible need for revisions as well as the process of nipple areolar  reconstruction.      The patient is concerned about the recovery associate with autologous reconstruction and declines.  As such, we discussed the option of a skin envelope reduction via a [place] approach.  The nature of the procedure and the scarring was demonstrated to the patient.  We discussed the likely need for future procedures including fat grafting as well as the possible need for more formal breast reconstruction with abdominal free flap in the future.  The risks of the surgery were reviewed including but not limited to bleeding, infection, scarring, seroma, delayed wound healing, injury to adjacent structures, mastectomy skin flap necrosis, and need for further surgery.  We discussed expected postoperative course including need for drains, activity limitation, and compression.  Multiple questions were answered to patient satisfaction.  No guarantees as to outcome were offered.  The patient expresses understanding and wishes to proceed. A total of 40 minutes was spent reviewing the patient's history and diagnostic testing, examining the patient, reviewing reconstructive options, and coordinating the patient's care.

## 2024-01-15 ENCOUNTER — GENETICS ENCOUNTER (OUTPATIENT)
Dept: HEMATOLOGY/ONCOLOGY | Facility: HOSPITAL | Age: 49
End: 2024-01-15

## 2024-01-15 ENCOUNTER — HOSPITAL ENCOUNTER (OUTPATIENT)
Dept: CT IMAGING | Facility: HOSPITAL | Age: 49
Discharge: HOME | End: 2024-01-15
Attending: INTERNAL MEDICINE
Payer: COMMERCIAL

## 2024-01-15 ENCOUNTER — LAB ENCOUNTER (OUTPATIENT)
Dept: LAB | Age: 49
End: 2024-01-15
Attending: FAMILY MEDICINE
Payer: COMMERCIAL

## 2024-01-15 ENCOUNTER — HOSPITAL ENCOUNTER (OUTPATIENT)
Dept: NUCLEAR MEDICINE | Facility: HOSPITAL | Age: 49
Discharge: HOME | End: 2024-01-15
Attending: INTERNAL MEDICINE
Payer: COMMERCIAL

## 2024-01-15 DIAGNOSIS — C50.812 MALIGNANT NEOPLASM OF OVERLAPPING SITES OF LEFT BREAST IN FEMALE, ESTROGEN RECEPTOR POSITIVE  (HCC): ICD-10-CM

## 2024-01-15 DIAGNOSIS — Z00.00 WELLNESS EXAMINATION: ICD-10-CM

## 2024-01-15 DIAGNOSIS — Z17.0 MALIGNANT NEOPLASM OF OVERLAPPING SITES OF LEFT BREAST IN FEMALE, ESTROGEN RECEPTOR POSITIVE  (HCC): ICD-10-CM

## 2024-01-15 LAB
ALBUMIN SERPL-MCNC: 4.1 G/DL (ref 3.4–5)
ALBUMIN/GLOB SERPL: 1.2 {RATIO} (ref 1–2)
ALP LIVER SERPL-CCNC: 69 U/L
ALT SERPL-CCNC: 21 U/L
ANION GAP SERPL CALC-SCNC: 5 MMOL/L (ref 0–18)
AST SERPL-CCNC: 11 U/L (ref 15–37)
BASOPHILS # BLD AUTO: 0.07 X10(3) UL (ref 0–0.2)
BASOPHILS NFR BLD AUTO: 0.9 %
BILIRUB SERPL-MCNC: 0.7 MG/DL (ref 0.1–2)
BUN BLD-MCNC: 16 MG/DL (ref 9–23)
CALCIUM BLD-MCNC: 9.6 MG/DL (ref 8.5–10.1)
CHLORIDE SERPL-SCNC: 106 MMOL/L (ref 98–112)
CHOLEST SERPL-MCNC: 171 MG/DL (ref ?–200)
CO2 SERPL-SCNC: 27 MMOL/L (ref 21–32)
CREAT BLD-MCNC: 1 MG/DL
CREAT BLD-MCNC: 1.07 MG/DL
EGFRCR SERPLBLD CKD-EPI 2021: 65 ML/MIN/1.73M2 (ref 60–?)
EGFRCR SERPLBLD CKD-EPI 2021: 70 ML/MIN/1.73M2 (ref 60–?)
EOSINOPHIL # BLD AUTO: 0.42 X10(3) UL (ref 0–0.7)
EOSINOPHIL NFR BLD AUTO: 5.7 %
ERYTHROCYTE [DISTWIDTH] IN BLOOD BY AUTOMATED COUNT: 13.4 %
FASTING PATIENT LIPID ANSWER: YES
FASTING STATUS PATIENT QL REPORTED: YES
GLOBULIN PLAS-MCNC: 3.3 G/DL (ref 2.8–4.4)
GLUCOSE BLD-MCNC: 87 MG/DL (ref 70–99)
HCT VFR BLD AUTO: 47.6 %
HDLC SERPL-MCNC: 61 MG/DL (ref 40–59)
HGB BLD-MCNC: 15.1 G/DL
IMM GRANULOCYTES # BLD AUTO: 0.02 X10(3) UL (ref 0–1)
IMM GRANULOCYTES NFR BLD: 0.3 %
LDLC SERPL CALC-MCNC: 90 MG/DL (ref ?–100)
LYMPHOCYTES # BLD AUTO: 1.57 X10(3) UL (ref 1–4)
LYMPHOCYTES NFR BLD AUTO: 21.2 %
MCH RBC QN AUTO: 29.4 PG (ref 26–34)
MCHC RBC AUTO-ENTMCNC: 31.7 G/DL (ref 31–37)
MCV RBC AUTO: 92.6 FL
MONOCYTES # BLD AUTO: 0.69 X10(3) UL (ref 0.1–1)
MONOCYTES NFR BLD AUTO: 9.3 %
NEUTROPHILS # BLD AUTO: 4.62 X10 (3) UL (ref 1.5–7.7)
NEUTROPHILS # BLD AUTO: 4.62 X10(3) UL (ref 1.5–7.7)
NEUTROPHILS NFR BLD AUTO: 62.6 %
NONHDLC SERPL-MCNC: 110 MG/DL (ref ?–130)
OSMOLALITY SERPL CALC.SUM OF ELEC: 287 MOSM/KG (ref 275–295)
PLATELET # BLD AUTO: 329 10(3)UL (ref 150–450)
POTASSIUM SERPL-SCNC: 3.9 MMOL/L (ref 3.5–5.1)
PROT SERPL-MCNC: 7.4 G/DL (ref 6.4–8.2)
RBC # BLD AUTO: 5.14 X10(6)UL
SODIUM SERPL-SCNC: 138 MMOL/L (ref 136–145)
T4 FREE SERPL-MCNC: 0.9 NG/DL (ref 0.8–1.7)
TRIGL SERPL-MCNC: 110 MG/DL (ref 30–149)
TSI SER-ACNC: 2.4 MIU/ML (ref 0.36–3.74)
VLDLC SERPL CALC-MCNC: 18 MG/DL (ref 0–30)
WBC # BLD AUTO: 7.4 X10(3) UL (ref 4–11)

## 2024-01-15 PROCEDURE — 80061 LIPID PANEL: CPT

## 2024-01-15 PROCEDURE — 85025 COMPLETE CBC W/AUTO DIFF WBC: CPT

## 2024-01-15 PROCEDURE — 74177 CT ABD & PELVIS W/CONTRAST: CPT | Performed by: INTERNAL MEDICINE

## 2024-01-15 PROCEDURE — 80053 COMPREHEN METABOLIC PANEL: CPT

## 2024-01-15 PROCEDURE — 82565 ASSAY OF CREATININE: CPT

## 2024-01-15 PROCEDURE — 78832 RP LOCLZJ TUM SPECT W/CT 2: CPT | Performed by: INTERNAL MEDICINE

## 2024-01-15 PROCEDURE — 84443 ASSAY THYROID STIM HORMONE: CPT

## 2024-01-15 PROCEDURE — 71260 CT THORAX DX C+: CPT | Performed by: INTERNAL MEDICINE

## 2024-01-15 PROCEDURE — 78306 BONE IMAGING WHOLE BODY: CPT | Performed by: INTERNAL MEDICINE

## 2024-01-15 PROCEDURE — 84439 ASSAY OF FREE THYROXINE: CPT

## 2024-01-15 RX ORDER — IOHEXOL 350 MG/ML
80 INJECTION, SOLUTION INTRAVENOUS
Status: COMPLETED | OUTPATIENT
Start: 2024-01-15 | End: 2024-01-15

## 2024-01-15 RX ADMIN — IOHEXOL 80 ML: 350 INJECTION, SOLUTION INTRAVENOUS at 11:25:00

## 2024-01-15 NOTE — PROGRESS NOTES
Referring Provider:                    Caro Nicole MD     Additional Provider(s):              MD Slick Cabral MD    Reason for Referral:  Harleen Euceda had genetic testing performed on 01/08/24 because of a new diagnosis of breast cancer at age 48 and a family history of breast cancer.     Genetic Testing Result:  No known pathogenic variants were found in the following 9 genes: JOHN, BRCA1, BRCA2, CDH1, CHEK2, PALB2, PTEN, STK11, and TP53.  Please refer to the report from [employer] ([id number]) for additional testing information. These results were discussed with Ms. Euceda by phone on 01/15/24.      Summary and Plan:  These results indicate that it is unlikely that Ms. Euceda has a pathogenic variant in any of the genes listed above. The limitations of the testing include the chance that a pathogenic variant in a gene other than those included in this analysis might be the cause of cancer in Ms. Euceda or her relatives. I encouraged Ms. Euceda to call me at [phone] if she decides to proceed with the recommended reflex testing (available at no additional cost if requested within 60 days) to look for pathogenic variants in additional genes associated with hereditary cancer predisposition genes. Otherwise, I encourage Ms. Euceda to contact me on an annual basis to see if there have been any updates in genetic testing that would apply to her or to inform me if there are any changes to the family history.    In the meantime, Ms. Euceda and her relatives should speak with their physicians to discuss recommended medical management according to their personal and family history.    Cc:  Harleen Euceda

## 2024-01-16 ENCOUNTER — TELEPHONE (OUTPATIENT)
Dept: HEMATOLOGY/ONCOLOGY | Facility: HOSPITAL | Age: 49
End: 2024-01-16

## 2024-01-16 NOTE — TELEPHONE ENCOUNTER
Called patient regarding CT and bone scan results.  She did see the results in MyChart.  She confirmed surgical preferences for a bilateral mastectomy with an envelope flap closure with Dr. Alonzo and no tissue expanders.

## 2024-01-18 ENCOUNTER — TELEPHONE (OUTPATIENT)
Facility: LOCATION | Age: 49
End: 2024-01-18

## 2024-01-18 DIAGNOSIS — C50.412 MALIGNANT NEOPLASM OF UPPER-OUTER QUADRANT OF LEFT BREAST IN FEMALE, ESTROGEN RECEPTOR POSITIVE  (HCC): Primary | ICD-10-CM

## 2024-01-18 DIAGNOSIS — C50.912 MALIGNANT NEOPLASM OF LEFT FEMALE BREAST, UNSPECIFIED ESTROGEN RECEPTOR STATUS, UNSPECIFIED SITE OF BREAST (HCC): Primary | ICD-10-CM

## 2024-01-18 DIAGNOSIS — Z17.0 MALIGNANT NEOPLASM OF UPPER-OUTER QUADRANT OF LEFT BREAST IN FEMALE, ESTROGEN RECEPTOR POSITIVE  (HCC): Primary | ICD-10-CM

## 2024-01-22 ENCOUNTER — TELEPHONE (OUTPATIENT)
Dept: MAMMOGRAPHY | Facility: HOSPITAL | Age: 49
End: 2024-01-22

## 2024-01-22 RX ORDER — DIAZEPAM 5 MG
5 TABLET ORAL AS NEEDED
Status: CANCELLED | OUTPATIENT
Start: 2024-01-22

## 2024-01-22 RX ORDER — CEFAZOLIN SODIUM/WATER 2 G/20 ML
2 SYRINGE (ML) INTRAVENOUS ONCE
Status: CANCELLED | OUTPATIENT
Start: 2024-01-22 | End: 2024-01-22

## 2024-01-22 RX ORDER — LIDOCAINE/PRILOCAINE 2.5 %-2.5%
CREAM (GRAM) TOPICAL ONCE
Status: CANCELLED | OUTPATIENT
Start: 2024-01-22 | End: 2024-01-22

## 2024-01-22 NOTE — TELEPHONE ENCOUNTER
Spoke with Harleen Euceda regarding [place] Lymph Node mapping which will be done in nuclear medicine department before mastectomy surgery scheduled for 2-21-24. Procedure explained and all questions answered. Verbal education about lymphedema given.  Breast Care Coordinator to provide written information regarding mastectomy surgery, breast cancer and lymphedema. Pt verbalized understanding and had no further questions at this time.

## 2024-01-23 ENCOUNTER — TELEPHONE (OUTPATIENT)
Dept: HEMATOLOGY/ONCOLOGY | Facility: HOSPITAL | Age: 49
End: 2024-01-23

## 2024-01-23 NOTE — TELEPHONE ENCOUNTER
Called patient in regards to scheduling the pre op mastectomy class before her scheduled surgery with Dr. Nicole and Dr. Alonzo and scheduled for the pre op mastectomy class for Wednesday February 7, 2024 at 1030 at the [facility]. She thanked me for phone call and assistance. Pt was provided with the breast nurse navigators contact information and was encouraged to phone with any other questions or concerns.

## 2024-02-01 ENCOUNTER — TELEPHONE (OUTPATIENT)
Dept: FAMILY MEDICINE CLINIC | Facility: CLINIC | Age: 49
End: 2024-02-01

## 2024-02-01 ENCOUNTER — TELEPHONE (OUTPATIENT)
Dept: SURGERY | Facility: CLINIC | Age: 49
End: 2024-02-01

## 2024-02-01 DIAGNOSIS — Z01.818 PREOP EXAMINATION: Primary | ICD-10-CM

## 2024-02-01 NOTE — TELEPHONE ENCOUNTER
Pt called LVM regarding labs if she needed to have them redrawn, informed pt that she needs them draw again,  needs to be 30 days before surgery , PCP already put in her order today she has pcp appt for 02/14/24. For clearance. Pt was appreciative of the call back.

## 2024-02-01 NOTE — TELEPHONE ENCOUNTER
Patient is scheduled for a preop   Future Appointments   Date Time Provider Department Center   2/7/2024 10:30 AM Lorrie Harris, [facility]  [facility]   2/14/2024  8:30 AM Ezra Jones MD [facility] [address] [facility]   2/21/2024 12:30 PM  [facility]  [facility]     EKG,cbc,cmp are needed.    Labs and EKG ordered and pt notified via [employer]hart to complete prior to appointment.

## 2024-02-05 ENCOUNTER — EKG ENCOUNTER (OUTPATIENT)
Dept: LAB | Age: 49
End: 2024-02-05
Attending: FAMILY MEDICINE
Payer: COMMERCIAL

## 2024-02-05 ENCOUNTER — LAB ENCOUNTER (OUTPATIENT)
Dept: LAB | Age: 49
End: 2024-02-05
Attending: FAMILY MEDICINE
Payer: COMMERCIAL

## 2024-02-05 DIAGNOSIS — Z01.818 PREOP EXAMINATION: ICD-10-CM

## 2024-02-05 LAB
ALBUMIN SERPL-MCNC: 3.8 G/DL (ref 3.4–5)
ALBUMIN/GLOB SERPL: 1.2 {RATIO} (ref 1–2)
ALP LIVER SERPL-CCNC: 74 U/L
ALT SERPL-CCNC: 21 U/L
ANION GAP SERPL CALC-SCNC: 4 MMOL/L (ref 0–18)
AST SERPL-CCNC: 16 U/L (ref 15–37)
ATRIAL RATE: 64 BPM
BASOPHILS # BLD AUTO: 0.07 X10(3) UL (ref 0–0.2)
BASOPHILS NFR BLD AUTO: 0.8 %
BILIRUB SERPL-MCNC: 0.4 MG/DL (ref 0.1–2)
BUN BLD-MCNC: 10 MG/DL (ref 9–23)
CALCIUM BLD-MCNC: 9 MG/DL (ref 8.5–10.1)
CHLORIDE SERPL-SCNC: 109 MMOL/L (ref 98–112)
CO2 SERPL-SCNC: 26 MMOL/L (ref 21–32)
CREAT BLD-MCNC: 0.9 MG/DL
EGFRCR SERPLBLD CKD-EPI 2021: 80 ML/MIN/1.73M2 (ref 60–?)
EOSINOPHIL # BLD AUTO: 0.51 X10(3) UL (ref 0–0.7)
EOSINOPHIL NFR BLD AUTO: 6 %
ERYTHROCYTE [DISTWIDTH] IN BLOOD BY AUTOMATED COUNT: 13.7 %
FASTING STATUS PATIENT QL REPORTED: NO
GLOBULIN PLAS-MCNC: 3.3 G/DL (ref 2.8–4.4)
GLUCOSE BLD-MCNC: 96 MG/DL (ref 70–99)
HCT VFR BLD AUTO: 41.9 %
HGB BLD-MCNC: 14.3 G/DL
IMM GRANULOCYTES # BLD AUTO: 0.05 X10(3) UL (ref 0–1)
IMM GRANULOCYTES NFR BLD: 0.6 %
LYMPHOCYTES # BLD AUTO: 1.73 X10(3) UL (ref 1–4)
LYMPHOCYTES NFR BLD AUTO: 20.4 %
MCH RBC QN AUTO: 30.2 PG (ref 26–34)
MCHC RBC AUTO-ENTMCNC: 34.1 G/DL (ref 31–37)
MCV RBC AUTO: 88.4 FL
MONOCYTES # BLD AUTO: 0.7 X10(3) UL (ref 0.1–1)
MONOCYTES NFR BLD AUTO: 8.2 %
NEUTROPHILS # BLD AUTO: 5.44 X10 (3) UL (ref 1.5–7.7)
NEUTROPHILS # BLD AUTO: 5.44 X10(3) UL (ref 1.5–7.7)
NEUTROPHILS NFR BLD AUTO: 64 %
OSMOLALITY SERPL CALC.SUM OF ELEC: 287 MOSM/KG (ref 275–295)
P AXIS: 58 DEGREES
P-R INTERVAL: 128 MS
PLATELET # BLD AUTO: 298 10(3)UL (ref 150–450)
POTASSIUM SERPL-SCNC: 4 MMOL/L (ref 3.5–5.1)
PROT SERPL-MCNC: 7.1 G/DL (ref 6.4–8.2)
Q-T INTERVAL: 398 MS
QRS DURATION: 84 MS
QTC CALCULATION (BEZET): 410 MS
R AXIS: 49 DEGREES
RBC # BLD AUTO: 4.74 X10(6)UL
SODIUM SERPL-SCNC: 139 MMOL/L (ref 136–145)
T AXIS: 43 DEGREES
VENTRICULAR RATE: 64 BPM
WBC # BLD AUTO: 8.5 X10(3) UL (ref 4–11)

## 2024-02-05 PROCEDURE — 36415 COLL VENOUS BLD VENIPUNCTURE: CPT

## 2024-02-05 PROCEDURE — 93010 ELECTROCARDIOGRAM REPORT: CPT | Performed by: INTERNAL MEDICINE

## 2024-02-05 PROCEDURE — 93005 ELECTROCARDIOGRAM TRACING: CPT

## 2024-02-05 PROCEDURE — 80053 COMPREHEN METABOLIC PANEL: CPT

## 2024-02-05 PROCEDURE — 85025 COMPLETE CBC W/AUTO DIFF WBC: CPT

## 2024-02-07 ENCOUNTER — NURSE ONLY (OUTPATIENT)
Dept: HEMATOLOGY/ONCOLOGY | Facility: HOSPITAL | Age: 49
End: 2024-02-07
Attending: SURGERY
Payer: COMMERCIAL

## 2024-02-07 NOTE — PROGRESS NOTES
Patient attended pre-operative mastectomy course in the cancer center.  Discussed what to expect on day of surgery, PAT phone calls, sentinel lymph node mapping procedure, mastectomy bra and dressings, drain care and measurement on drain log, pain control, medication and constipation prevention, lymphedema awareness, post-op exercises and provided samples of various styles of breast prosthesis.  During class was provided ample hands-on time to practice stripping HO drain tubing, reviewing drainage cup and reattaching HO drain to mastectomy bras.    Patient was provided a bag with heart-shaped splinting pillow, drain lanyard, gauze/kerlix, bio patch, tegaderm, abd pads and drainage cup.  Resources provided for [employer], post-operative resources, drain log and mastectomy education.  Pathology and follow-up timelines reviewed.  Re-enforced teaching regarding emergency care and when to contact surgeon's office.    After class emailed PowerPoint slides, drain care video and supplemental information from the [employer]. Patient was given navigator contact information and encouraged to reach out with any other questions or concerns.

## 2024-02-12 ENCOUNTER — HOSPITAL ENCOUNTER (OUTPATIENT)
Facility: HOSPITAL | Age: 49
Setting detail: OBSERVATION
Discharge: HOME | End: 2024-02-13
Attending: EMERGENCY MEDICINE | Admitting: INTERNAL MEDICINE
Payer: COMMERCIAL

## 2024-02-12 ENCOUNTER — TELEPHONE (OUTPATIENT)
Dept: FAMILY MEDICINE CLINIC | Facility: CLINIC | Age: 49
End: 2024-02-12
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
## 2024-02-12 DIAGNOSIS — T18.128A ESOPHAGEAL OBSTRUCTION DUE TO FOOD IMPACTION: Primary | ICD-10-CM

## 2024-02-12 LAB
ALBUMIN SERPL-MCNC: 4.2 G/DL (ref 3.4–5)
ALBUMIN/GLOB SERPL: 1.1 {RATIO} (ref 1–2)
ALP LIVER SERPL-CCNC: 80 U/L
ALT SERPL-CCNC: 27 U/L
ANION GAP SERPL CALC-SCNC: 7 MMOL/L (ref 0–18)
AST SERPL-CCNC: 14 U/L (ref 15–37)
BASOPHILS # BLD AUTO: 0.07 X10(3) UL (ref 0–0.2)
BASOPHILS NFR BLD AUTO: 0.6 %
BILIRUB SERPL-MCNC: 0.6 MG/DL (ref 0.1–2)
BUN BLD-MCNC: 12 MG/DL (ref 9–23)
CALCIUM BLD-MCNC: 9.5 MG/DL (ref 8.5–10.1)
CHLORIDE SERPL-SCNC: 111 MMOL/L (ref 98–112)
CO2 SERPL-SCNC: 25 MMOL/L (ref 21–32)
CREAT BLD-MCNC: 1.05 MG/DL
EGFRCR SERPLBLD CKD-EPI 2021: 66 ML/MIN/1.73M2 (ref 60–?)
EOSINOPHIL # BLD AUTO: 0.17 X10(3) UL (ref 0–0.7)
EOSINOPHIL NFR BLD AUTO: 1.5 %
ERYTHROCYTE [DISTWIDTH] IN BLOOD BY AUTOMATED COUNT: 14 %
GLOBULIN PLAS-MCNC: 3.9 G/DL (ref 2.8–4.4)
GLUCOSE BLD-MCNC: 100 MG/DL (ref 70–99)
HCT VFR BLD AUTO: 46.3 %
HGB BLD-MCNC: 15.5 G/DL
IMM GRANULOCYTES # BLD AUTO: 0.04 X10(3) UL (ref 0–1)
IMM GRANULOCYTES NFR BLD: 0.4 %
LYMPHOCYTES # BLD AUTO: 1.65 X10(3) UL (ref 1–4)
LYMPHOCYTES NFR BLD AUTO: 14.8 %
MCH RBC QN AUTO: 29.6 PG (ref 26–34)
MCHC RBC AUTO-ENTMCNC: 33.5 G/DL (ref 31–37)
MCV RBC AUTO: 88.5 FL
MONOCYTES # BLD AUTO: 0.85 X10(3) UL (ref 0.1–1)
MONOCYTES NFR BLD AUTO: 7.6 %
NEUTROPHILS # BLD AUTO: 8.34 X10 (3) UL (ref 1.5–7.7)
NEUTROPHILS # BLD AUTO: 8.34 X10(3) UL (ref 1.5–7.7)
NEUTROPHILS NFR BLD AUTO: 75.1 %
OSMOLALITY SERPL CALC.SUM OF ELEC: 296 MOSM/KG (ref 275–295)
PLATELET # BLD AUTO: 350 10(3)UL (ref 150–450)
POTASSIUM SERPL-SCNC: 3.7 MMOL/L (ref 3.5–5.1)
PROT SERPL-MCNC: 8.1 G/DL (ref 6.4–8.2)
RBC # BLD AUTO: 5.23 X10(6)UL
SODIUM SERPL-SCNC: 143 MMOL/L (ref 136–145)
WBC # BLD AUTO: 11.1 X10(3) UL (ref 4–11)

## 2024-02-12 RX ORDER — ONDANSETRON 2 MG/ML
4 INJECTION INTRAMUSCULAR; INTRAVENOUS ONCE
Status: COMPLETED | OUTPATIENT
Start: 2024-02-12 | End: 2024-02-12

## 2024-02-13 ENCOUNTER — ANESTHESIA (OUTPATIENT)
Dept: ENDOSCOPY | Facility: HOSPITAL | Age: 49
End: 2024-02-13
Payer: COMMERCIAL

## 2024-02-13 ENCOUNTER — ANESTHESIA EVENT (OUTPATIENT)
Dept: ENDOSCOPY | Facility: HOSPITAL | Age: 49
End: 2024-02-13
Payer: COMMERCIAL

## 2024-02-13 ENCOUNTER — APPOINTMENT (OUTPATIENT)
Dept: GENERAL RADIOLOGY | Facility: HOSPITAL | Age: 49
End: 2024-02-13
Attending: INTERNAL MEDICINE
Payer: COMMERCIAL

## 2024-02-13 VITALS
TEMPERATURE: 98 F | WEIGHT: 160 LBS | HEIGHT: 63 IN | RESPIRATION RATE: 18 BRPM | BODY MASS INDEX: 28.35 KG/M2 | SYSTOLIC BLOOD PRESSURE: 151 MMHG | OXYGEN SATURATION: 100 % | DIASTOLIC BLOOD PRESSURE: 97 MMHG | HEART RATE: 82 BPM

## 2024-02-13 PROBLEM — T18.128A ESOPHAGEAL OBSTRUCTION DUE TO FOOD IMPACTION: Status: ACTIVE | Noted: 2024-02-13
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
## 2024-02-13 LAB
HCG UR QL: NEGATIVE
SARS-COV-2 RNA RESP QL NAA+PROBE: NOT DETECTED

## 2024-02-13 PROCEDURE — 0DC48ZZ EXTIRPATION OF MATTER FROM ESOPHAGOGASTRIC JUNCTION, VIA NATURAL OR ARTIFICIAL OPENING ENDOSCOPIC: ICD-10-PCS | Performed by: INTERNAL MEDICINE

## 2024-02-13 PROCEDURE — 74240 X-RAY XM UPR GI TRC 1CNTRST: CPT | Performed by: INTERNAL MEDICINE

## 2024-02-13 PROCEDURE — 99223 1ST HOSP IP/OBS HIGH 75: CPT | Performed by: INTERNAL MEDICINE

## 2024-02-13 RX ORDER — MORPHINE SULFATE 4 MG/ML
2 INJECTION, SOLUTION INTRAMUSCULAR; INTRAVENOUS EVERY 2 HOUR PRN
Status: DISCONTINUED | OUTPATIENT
Start: 2024-02-13 | End: 2024-02-13

## 2024-02-13 RX ORDER — HEPARIN SODIUM 5000 [USP'U]/ML
5000 INJECTION, SOLUTION INTRAVENOUS; SUBCUTANEOUS EVERY 8 HOURS SCHEDULED
Status: DISCONTINUED | OUTPATIENT
Start: 2024-02-13 | End: 2024-02-13

## 2024-02-13 RX ORDER — SUCRALFATE ORAL 1 G/10ML
1 SUSPENSION ORAL
Status: DISCONTINUED | OUTPATIENT
Start: 2024-02-13 | End: 2024-02-13

## 2024-02-13 RX ORDER — ONDANSETRON 2 MG/ML
4 INJECTION INTRAMUSCULAR; INTRAVENOUS EVERY 6 HOURS PRN
Status: DISCONTINUED | OUTPATIENT
Start: 2024-02-13 | End: 2024-02-13

## 2024-02-13 RX ORDER — SODIUM CHLORIDE 9 MG/ML
INJECTION, SOLUTION INTRAVENOUS CONTINUOUS
Status: ACTIVE | OUTPATIENT
Start: 2024-02-13 | End: 2024-02-13

## 2024-02-13 RX ORDER — MORPHINE SULFATE 4 MG/ML
1 INJECTION, SOLUTION INTRAMUSCULAR; INTRAVENOUS EVERY 2 HOUR PRN
Status: DISCONTINUED | OUTPATIENT
Start: 2024-02-13 | End: 2024-02-13

## 2024-02-13 RX ORDER — PROCHLORPERAZINE EDISYLATE 5 MG/ML
5 INJECTION INTRAMUSCULAR; INTRAVENOUS EVERY 8 HOURS PRN
Status: DISCONTINUED | OUTPATIENT
Start: 2024-02-13 | End: 2024-02-13

## 2024-02-13 RX ORDER — LIDOCAINE HYDROCHLORIDE 10 MG/ML
INJECTION, SOLUTION EPIDURAL; INFILTRATION; INTRACAUDAL; PERINEURAL AS NEEDED
Status: DISCONTINUED | OUTPATIENT
Start: 2024-02-13 | End: 2024-02-13 | Stop reason: SURG

## 2024-02-13 RX ORDER — ACETAMINOPHEN 500 MG
500 TABLET ORAL EVERY 4 HOURS PRN
Status: DISCONTINUED | OUTPATIENT
Start: 2024-02-13 | End: 2024-02-13

## 2024-02-13 RX ORDER — NALOXONE HYDROCHLORIDE 0.4 MG/ML
0.08 INJECTION, SOLUTION INTRAMUSCULAR; INTRAVENOUS; SUBCUTANEOUS AS NEEDED
Status: DISCONTINUED | OUTPATIENT
Start: 2024-02-13 | End: 2024-02-13 | Stop reason: HOSPADM

## 2024-02-13 RX ORDER — HYDROMORPHONE HYDROCHLORIDE 1 MG/ML
0.2 INJECTION, SOLUTION INTRAMUSCULAR; INTRAVENOUS; SUBCUTANEOUS EVERY 5 MIN PRN
Status: DISCONTINUED | OUTPATIENT
Start: 2024-02-13 | End: 2024-02-13 | Stop reason: HOSPADM

## 2024-02-13 RX ORDER — ONDANSETRON 2 MG/ML
4 INJECTION INTRAMUSCULAR; INTRAVENOUS EVERY 4 HOURS PRN
Status: ACTIVE | OUTPATIENT
Start: 2024-02-13 | End: 2024-02-13

## 2024-02-13 RX ORDER — SODIUM CHLORIDE 9 MG/ML
INJECTION, SOLUTION INTRAVENOUS CONTINUOUS
Status: DISCONTINUED | OUTPATIENT
Start: 2024-02-13 | End: 2024-02-13

## 2024-02-13 RX ORDER — HYDROMORPHONE HYDROCHLORIDE 1 MG/ML
0.6 INJECTION, SOLUTION INTRAMUSCULAR; INTRAVENOUS; SUBCUTANEOUS EVERY 5 MIN PRN
Status: DISCONTINUED | OUTPATIENT
Start: 2024-02-13 | End: 2024-02-13 | Stop reason: HOSPADM

## 2024-02-13 RX ORDER — SODIUM CHLORIDE, SODIUM LACTATE, POTASSIUM CHLORIDE, CALCIUM CHLORIDE 600; 310; 30; 20 MG/100ML; MG/100ML; MG/100ML; MG/100ML
INJECTION, SOLUTION INTRAVENOUS CONTINUOUS
Status: DISCONTINUED | OUTPATIENT
Start: 2024-02-13 | End: 2024-02-13 | Stop reason: HOSPADM

## 2024-02-13 RX ORDER — HYDROMORPHONE HYDROCHLORIDE 1 MG/ML
0.4 INJECTION, SOLUTION INTRAMUSCULAR; INTRAVENOUS; SUBCUTANEOUS EVERY 5 MIN PRN
Status: DISCONTINUED | OUTPATIENT
Start: 2024-02-13 | End: 2024-02-13 | Stop reason: HOSPADM

## 2024-02-13 RX ORDER — METOCLOPRAMIDE HYDROCHLORIDE 5 MG/ML
INJECTION INTRAMUSCULAR; INTRAVENOUS AS NEEDED
Status: DISCONTINUED | OUTPATIENT
Start: 2024-02-13 | End: 2024-02-13 | Stop reason: SURG

## 2024-02-13 RX ORDER — MORPHINE SULFATE 4 MG/ML
4 INJECTION, SOLUTION INTRAMUSCULAR; INTRAVENOUS EVERY 2 HOUR PRN
Status: DISCONTINUED | OUTPATIENT
Start: 2024-02-13 | End: 2024-02-13

## 2024-02-13 RX ORDER — ROCURONIUM BROMIDE 10 MG/ML
INJECTION, SOLUTION INTRAVENOUS AS NEEDED
Status: DISCONTINUED | OUTPATIENT
Start: 2024-02-13 | End: 2024-02-13 | Stop reason: SURG

## 2024-02-13 RX ORDER — DEXAMETHASONE SODIUM PHOSPHATE 4 MG/ML
VIAL (ML) INJECTION AS NEEDED
Status: DISCONTINUED | OUTPATIENT
Start: 2024-02-13 | End: 2024-02-13 | Stop reason: SURG

## 2024-02-13 RX ORDER — SUCRALFATE ORAL 1 G/10ML
1 SUSPENSION ORAL
Qty: 1200 ML | Refills: 0 | Status: SHIPPED | OUTPATIENT
Start: 2024-02-13 | End: 2024-03-14

## 2024-02-13 RX ORDER — PANTOPRAZOLE SODIUM 40 MG/1
40 TABLET, DELAYED RELEASE ORAL
Qty: 60 TABLET | Refills: 0 | Status: SHIPPED | OUTPATIENT
Start: 2024-02-13 | End: 2024-03-14

## 2024-02-13 RX ADMIN — DEXAMETHASONE SODIUM PHOSPHATE 4 MG: 4 MG/ML VIAL (ML) INJECTION at 07:32:00

## 2024-02-13 RX ADMIN — METOCLOPRAMIDE HYDROCHLORIDE 10 MG: 5 INJECTION INTRAMUSCULAR; INTRAVENOUS at 07:38:00

## 2024-02-13 RX ADMIN — LIDOCAINE HYDROCHLORIDE 50 MG: 10 INJECTION, SOLUTION EPIDURAL; INFILTRATION; INTRACAUDAL; PERINEURAL at 07:25:00

## 2024-02-13 RX ADMIN — SODIUM CHLORIDE: 9 INJECTION, SOLUTION INTRAVENOUS at 07:22:00

## 2024-02-13 RX ADMIN — ROCURONIUM BROMIDE 5 MG: 10 INJECTION, SOLUTION INTRAVENOUS at 07:25:00

## 2024-02-13 NOTE — ANESTHESIA PREPROCEDURE EVALUATION
PRE-OP EVALUATION    Patient Name: Harleen Euceda    Admit Diagnosis: Esophageal obstruction due to food impaction [T18.128A, W44.F3XA]    Pre-op Diagnosis: Food bolus    ESOPHAGOGASTRODUODENOSCOPY (EGD)    Anesthesia Procedure: ESOPHAGOGASTRODUODENOSCOPY (EGD)    Surgeon(s) and Role:     * Larry Salamanca MD - Primary    Pre-op vitals reviewed.  Temp: 98.4 °F (36.9 °C)  Pulse: 57  Resp: 18  BP: 144/82  SpO2: 98 %  Body mass index is 28.34 kg/m².    Current medications reviewed.  Hospital Medications:   [] sodium chloride 0.9% infusion   Intravenous Continuous    [] ondansetron (Zofran) 4 MG/2ML injection 4 mg  4 mg Intravenous Q4H PRN    sodium chloride 0.9% infusion   Intravenous Continuous    heparin (Porcine) 5000 UNIT/ML injection 5,000 Units  5,000 Units Subcutaneous Q8H CARRI    ondansetron (Zofran) 4 MG/2ML injection 4 mg  4 mg Intravenous Q6H PRN    prochlorperazine (Compazine) 10 MG/2ML injection 5 mg  5 mg Intravenous Q8H PRN    acetaminophen (Tylenol Extra Strength) tab 500 mg  500 mg Oral Q4H PRN    morphINE PF 4 MG/ML injection 1 mg  1 mg Intravenous Q2H PRN    Or    morphINE PF 4 MG/ML injection 2 mg  2 mg Intravenous Q2H PRN    Or    morphINE PF 4 MG/ML injection 4 mg  4 mg Intravenous Q2H PRN    [COMPLETED] glucagon (GlucaGen) injection 1 mg  1 mg Intravenous Once    [COMPLETED] ondansetron (Zofran) 4 MG/2ML injection 4 mg  4 mg Intravenous Once       Outpatient Medications:     No medications prior to admission.       Allergies: Other      Anesthesia Evaluation        Anesthetic Complications  (+) history of anesthetic complications  History of: PONV       GI/Hepatic/Renal    Negative GI/hepatic/renal ROS.                             Cardiovascular            MET: >4                                           Endo/Other                           (+) arthritis       Pulmonary    Negative pulmonary ROS.                       Neuro/Psych    Negative neuro/psych ROS.                                   Past Surgical History:   Procedure Laterality Date      ,,    EXTRACTION ERUPTED TOOTH/EXR      wisdom teeth    INSERT INTRAUTERINE DEVICE  2011    Mirena insertion    OTHER SURGICAL HISTORY Bilateral 2018    RLTCS with bilateral Salpingectomies    REMOVE INTRAUTERINE DEVICE      Mirena removal    SKIN SURGERY       Social History     Socioeconomic History    Marital status:    Tobacco Use    Smoking status: Never    Smokeless tobacco: Never   Vaping Use    Vaping Use: Never used   Substance and Sexual Activity    Alcohol use: Yes     Alcohol/week: 6.0 standard drinks of alcohol     Types: 6 Standard drinks or equivalent per week    Drug use: No   Other Topics Concern    Caffeine Concern Yes     Comment: 3 cups of coffee daily    Exercise No     History   Drug Use No     Available pre-op labs reviewed.  Lab Results   Component Value Date    WBC 11.1 (H) 2024    RBC 5.23 2024    HGB 15.5 2024    HCT 46.3 2024    MCV 88.5 2024    MCH 29.6 2024    MCHC 33.5 2024    RDW 14.0 2024    .0 2024     Lab Results   Component Value Date     2024    K 3.7 2024     2024    CO2 25.0 2024    BUN 12 2024    CREATSERUM 1.05 (H) 2024     (H) 2024    CA 9.5 2024            Airway      Mallampati: II  Mouth opening: 3 FB  TM distance: > 6 cm  Neck ROM: full Cardiovascular      Rhythm: regular  Rate: normal     Dental             Pulmonary      Breath sounds clear to auscultation bilaterally.               Other findings              ASA: 2 and emergent  Plan: general  NPO status verified and           Plan/risks discussed with: patient                Present on Admission:  **None**

## 2024-02-13 NOTE — ANESTHESIA POSTPROCEDURE EVALUATION
[facility]    Harleen Euceda Patient Status:  Observation   Age/Gender 48 year old female MRN [id number]   Location [facility] Attending Gilberto Yates MD   Hosp Day # 0 PCP Ezra Jones MD       Anesthesia Post-op Note    ESOPHAGOGASTRODUODENOSCOPY (EGD) with removal of food bolus    Procedure Summary       Date: 02/13/24 Room / Location:  ENDOSCOPY 03 /  ENDOSCOPY    Anesthesia Start: 0720 Anesthesia Stop: 0816    Procedure: ESOPHAGOGASTRODUODENOSCOPY (EGD) with removal of food bolus Diagnosis: (food bolus, esophagitis)    Surgeons: Larry Salamanca MD Anesthesiologist: Lonnie Lamas DO    Anesthesia Type: general ASA Status: 2 - Emergent            Anesthesia Type: general    Vitals Value Taken Time   /98 02/13/24 0816   Temp 97 °F (36.1 °C) 02/13/24 0816   Pulse 91 02/13/24 0816   Resp 20 02/13/24 0816   SpO2 100 % 02/13/24 0816   Vitals shown include unfiled device data.    Patient Location: PACU    Anesthesia Type: general    Airway Patency: patent    Postop Pain Control: adequate    Mental Status: mildly sedated but able to meaningfully participate in the post-anesthesia evaluation    Nausea/Vomiting: none    Cardiopulmonary/Hydration status: stable euvolemic    Complications: no apparent anesthesia related complications    Postop vital signs: stable    Dental Exam: Unchanged from Preop    Patient to be discharged from PACU when criteria met.

## 2024-02-13 NOTE — ED QUICK NOTES
Orders for admission, patient is aware of plan and ready to go upstairs. Any questions, please call ED RN Kobi at extension [phone].     Patient Covid vaccination status: Unvaccinated     COVID Test Ordered in ED: Rapid SARS-CoV-2 by PCR    COVID Suspicion at Admission: N/A    Running Infusions:  None    Mental Status/LOC at time of transport: AAOx4    Other pertinent information:   CIWA score: N/A   NIH score:  N/A

## 2024-02-13 NOTE — PLAN OF CARE
Patient A&O x4, room air , c/o pain to sternal area.  Patient has a food bolus from [employer].  No medical history until November, when diagnosised with Breast CA.  Has double mastectomy planned for near future.  Patient NPO with planned EGD.  NPO, Fluids, nausea meds, pain control.

## 2024-02-13 NOTE — ANESTHESIA PROCEDURE NOTES
Airway  Date/Time: 2/13/2024 7:25 AM  Urgency: elective    Airway not difficult    General Information and Staff    Patient location during procedure: other (note)  Anesthesiologist: Lonnie Lamas DO  Performed: anesthesiologist   Performed by: Lonnie Lamas DO  Authorized by: Lonnie Lamas DO      Indications and Patient Condition  Indications for airway management: anesthesia and airway protection  Spontaneous Ventilation: absent  Sedation level: deep  Preoxygenated: yes  Patient position: sniffing  Mask difficulty assessment: 1 - vent by mask    Final Airway Details  Final airway type: endotracheal airway      Successful airway: ETT  Cuffed: yes   Successful intubation technique: direct laryngoscopy  Facilitating devices/methods: rapid sequence intubation and cricoid pressure  Endotracheal tube insertion site: oral  Blade: Nicolas  Blade size: #3  ETT size (mm): 7.0    Cormack-Lehane Classification: grade I - full view of glottis  Placement verified by: capnometry   Measured from: lips  ETT to lips (cm): 22  Number of attempts at approach: 1

## 2024-02-13 NOTE — PLAN OF CARE
Pt discharged home via wheelchair accompanied by family. Prescriptions and discharge instructions given with pt verbalizing understanding.

## 2024-02-13 NOTE — TELEPHONE ENCOUNTER
Incoming call from patient.  Patient complains of having eaten a couple of bites of meat about 5 hours ago and feels as if something is stuck in her throat and not going down.  She is try to eat other food and drink water and states \"it comes right back up\".  Patient also notes that she is producing an extra amount of saliva or that the saliva is not \"going down\".  Patient is able to breathe and is not having difficulty in breathing.    Plan:  Patient was advised to immediately go to the [facility] emergency department, that is the emergency department that she lives closest to.  Additionally, patient was advised not to drive herself and to have someone else in her household drive her.  Patient is agreeable to plan.    FYI to PCP.

## 2024-02-13 NOTE — H&P
[place] [facility]  History and Physical     Harleen Euceda Patient Status:  Emergency    1975 MRN [id number]   Location [facility] [place] [facility] EM[facility] IN [place] Attending Sandie Becerra DO   Hosp Day # 0 PCP Ezra Jones MD     Chief Complaint: N/V, food bolus    Subjective:    History of Present Illness:     Harleen Euceda is a 48 year old female with PMhx of anxiety, breast cancer presents with N/V and food bolus. Pt ate a pork chop earlier this afternoon around 2pm. She felt it get stuck in her throat. She states she has had issues with this in the past and it usually resolves on its own. However she has had continued dificulty swallowing anything since symptoms started. She is unable to tolerate much fluids or solids. She has nausea. She has some pain to her esophagus as well. She denies any CP or SOB. No F/C.     History/Other:    Past Medical History:  Past Medical History:   Diagnosis Date    Anxiety state     Back problem     Breast cancer (HCC)     Fibroadenoma of left breast 2015    Hx of motion sickness     PONV (postoperative nausea and vomiting)     with last  over sedated and o2 sat dropped    Varicose vein 2015    Visual impairment      Past Surgical History:   Past Surgical History:   Procedure Laterality Date      ,,    EXTRACTION ERUPTED TOOTH/EXR      wisdom teeth    INSERT INTRAUTERINE DEVICE      Mirena insertion    OTHER SURGICAL HISTORY Bilateral 2018    RLTCS with bilateral Salpingectomies    REMOVE INTRAUTERINE DEVICE      Mirena removal    SKIN SURGERY        Family History:   Family History   Problem Relation Age of Onset    Hypertension Father     Diabetes Father     Heart Disorder Father 52        quad bypass    Other (hypothyroid) Father     Breast Cancer Mother 46    Hypertension Mother     Psychiatric Mother         depression    Breast Cancer Maternal Grandmother 63    Other (Cholangiocarinoma) Brother 36      Social History:    reports that she has never smoked. She has never used smokeless tobacco. She reports current alcohol use of about 6.0 standard drinks of alcohol per week. She reports that she does not use drugs.     Allergies:   Allergies   Allergen Reactions    Other SHORTNESS OF BREATH     Peonies and lemongrass.         Medications:    Current Facility-Administered Medications on File Prior to Encounter   Medication Dose Route Frequency Provider Last Rate Last Admin    [COMPLETED] iohexol (Omnipaque) 350 MG/ML injection via power injector 80 mL  80 mL Intravenous ONCE PRN Deejay May MD   80 mL at 01/15/24 1125    [COMPLETED] gadoterate meglumine (Dotarem) 7.5 MMOL/15ML injection 15 mL  15 mL Intravenous ONCE PRN Angie Greene   15 mL at 01/04/24 1613     No current outpatient medications on file prior to encounter.       Review of Systems:   A comprehensive review of systems was completed.    Pertinent positives and negatives noted in the HPI.    Objective:   Physical Exam:    BP (!) 158/99 (BP Location: Left arm)   Pulse 84   Temp 98.6 °F (37 °C) (Oral)   Resp 18   Ht 160 cm (5' 3\")   Wt 160 lb (72.6 kg)   LMP 01/29/2024 (Approximate)   SpO2 96%   BMI 28.34 kg/m²   General: No acute distress, Alert  Respiratory: No rhonchi, no wheezes  Cardiovascular: S1, S2. Regular rate and rhythm  Abdomen: Soft, Non-tender, non-distended, positive bowel sounds  Neuro: No new focal deficits  Extremities: No edema      Results:    Labs:      Labs Last 24 Hours:    Recent Labs   Lab 02/12/24  2318   RBC 5.23   HGB 15.5   HCT 46.3   MCV 88.5   MCH 29.6   MCHC 33.5   RDW 14.0   NEPRELIM 8.34*   WBC 11.1*   .0       Recent Labs   Lab 02/12/24  2318   *   BUN 12   CREATSERUM 1.05*   EGFRCR 66   CA 9.5   ALB 4.2      K 3.7      CO2 25.0   ALKPHO 80   AST 14*   ALT 27   BILT 0.6   TP 8.1       No results found for: \"PT\", \"INR\"    No results for input(s): \"TROP\", \"TROPHS\", \"CK\" in the  last 168 hours.    No results for input(s): \"TROP\", \"PBNP\" in the last 168 hours.    No results for input(s): \"PCT\" in the last 168 hours.    Imaging: Imaging data reviewed in Epic.    Assessment & Plan:      #Food Bolus  NPO  IVF  GI to eval for EGD and removal  Anti emetics    #Breast Cancer  Scheduled to have bilateral mastectomy  Recent CT from 1/15/24 reviewed     #Anxiety          Plan of care discussed with patient, ED Physician     Gilberto Yates MD    Supplementary Documentation:     The 21st Century Cures Act makes medical notes like these available to patients in the interest of transparency. Please be advised this is a medical document. Medical documents are intended to carry relevant information, facts as evident, and the clinical opinion of the practitioner. The medical note is intended as peer to peer communication and may appear blunt or direct. It is written in medical language and may contain abbreviations or verbiage that are unfamiliar.

## 2024-02-13 NOTE — ED INITIAL ASSESSMENT (HPI)
Patient here with foreign body sensation in throat that started after eating pork chops approximately six hours ago. Unable to eat or drink. Denies difficulty breathing.

## 2024-02-13 NOTE — CONSULTS
Consultation Note        Harleen Euceda Patient Status:  Observation    1975 MRN [id number]   Location [facility] 0SW-A Attending Gilberto Yates MD   Hosp Day # 0 PCP Ezra Jones MD       Reason for Consultation   Dysphagia, odynophagia       History of Present Illness   Ms Euceda is a 48 year old female with a history of breast cancer who presents with dysphagia and odynophagia. It began on  after eating a pork chop, and she feels it is stuck in her mid chest. She can't swallow her spit. She has never had this happen before, hasn't had an EGD before, and is not on blood thinners.         PMH/MEDS/ALLERGIES/SH/FH:     Past Medical History:   Diagnosis Date    Anxiety state     Back problem     Breast cancer (HCC)     Fibroadenoma of left breast 2015    Hx of motion sickness     PONV (postoperative nausea and vomiting)     with last  over sedated and o2 sat dropped    Varicose vein 2015    Visual impairment       Past Surgical History:   Procedure Laterality Date      ,,    EXTRACTION ERUPTED TOOTH/EXR      wisdom teeth    INSERT INTRAUTERINE DEVICE      Mirena insertion    OTHER SURGICAL HISTORY Bilateral 2018    RLTCS with bilateral Salpingectomies    REMOVE INTRAUTERINE DEVICE      Mirena removal    SKIN SURGERY          No recently discontinued medications to reconcile   No current facility-administered medications on file prior to encounter.     No current outpatient medications on file prior to encounter.       Current Allergies:   Allergies   Allergen Reactions    Other SHORTNESS OF BREATH     Peonies and lemongrass.         Social History     Occupational History    Not on file   Tobacco Use    Smoking status: Never    Smokeless tobacco: Never   Vaping Use    Vaping Use: Never used   Substance and Sexual Activity    Alcohol use: Yes     Alcohol/week: 6.0 standard drinks of alcohol     Types: 6 Standard drinks or equivalent  per week    Drug use: No    Sexual activity: Not on file       Family History   Problem Relation Age of Onset    Hypertension Father     Diabetes Father     Heart Disorder Father 52        quad bypass    Other (hypothyroid) Father     Breast Cancer Mother 46    Hypertension Mother     Psychiatric Mother         depression    Breast Cancer Maternal Grandmother 63    Other (Cholangiocarinoma) Brother 36              MEDICATIONS      [] sodium chloride 0.9% infusion   Intravenous Continuous    [] ondansetron (Zofran) 4 MG/2ML injection 4 mg  4 mg Intravenous Q4H PRN    sodium chloride 0.9% infusion   Intravenous Continuous    heparin (Porcine) 5000 UNIT/ML injection 5,000 Units  5,000 Units Subcutaneous Q8H CARRI    ondansetron (Zofran) 4 MG/2ML injection 4 mg  4 mg Intravenous Q6H PRN    prochlorperazine (Compazine) 10 MG/2ML injection 5 mg  5 mg Intravenous Q8H PRN    acetaminophen (Tylenol Extra Strength) tab 500 mg  500 mg Oral Q4H PRN    morphINE PF 4 MG/ML injection 1 mg  1 mg Intravenous Q2H PRN    Or    morphINE PF 4 MG/ML injection 2 mg  2 mg Intravenous Q2H PRN    Or    morphINE PF 4 MG/ML injection 4 mg  4 mg Intravenous Q2H PRN    pantoprazole (Protonix) 40 mg in sodium chloride 0.9% PF 10 mL IV push  40 mg Intravenous Q12H    [COMPLETED] fentaNYL (Sublimaze) 50 mcg/mL injection        [COMPLETED] glucagon (GlucaGen) injection 1 mg  1 mg Intravenous Once    [COMPLETED] ondansetron (Zofran) 4 MG/2ML injection 4 mg  4 mg Intravenous Once              ROS:     A comprehensive 14 point review of systems was completed.  Pertinent positives and negatives noted in the the HPI.          Physical Exam     Vital signs:  /89 (BP Location: Left arm)   Pulse 87   Temp 98.4 °F (36.9 °C) (Temporal)   Resp 19   Ht 5' 3\" (1.6 m)   Wt 160 lb (72.6 kg)   LMP 2024 (Approximate)   SpO2 100%   BMI 28.34 kg/m²         Physical Exam        General: Appears alert, oriented x 3 and in no acute  distress.  HEENT: Normal. No scleral icterus.   NECK: Supple. No neck vein distention. Thyroid not enlarged. No lymphadenopathy.  CV: S1 and S2 normal. No murmurs or gallops.  LUNGS: Clear to percussion and auscultation.  ABDOMEN: Soft and non-distended. Non-tender. No masses. Bowel sounds are present.  BACK: No CVA tenderness.  EXTREMITIES: No edema, cyanosis or clubbing.  SKIN: Warm and dry.         IMAGING/LABS       Labs:   Lab Results   Component Value Date    WBC 11.1 02/12/2024    HGB 15.5 02/12/2024    HCT 46.3 02/12/2024    .0 02/12/2024    CREATSERUM 1.05 02/12/2024    BUN 12 02/12/2024     02/12/2024    K 3.7 02/12/2024     02/12/2024    CO2 25.0 02/12/2024     02/12/2024    CA 9.5 02/12/2024    ALB 4.2 02/12/2024    ALKPHO 80 02/12/2024    BILT 0.6 02/12/2024    AST 14 02/12/2024    ALT 27 02/12/2024     Recent Labs   Lab 02/12/24  2318   *   BUN 12   CREATSERUM 1.05*   CA 9.5      K 3.7      CO2 25.0     Recent Labs   Lab 02/12/24 2318   RBC 5.23   HGB 15.5   HCT 46.3   MCV 88.5   MCH 29.6   MCHC 33.5   RDW 14.0   NEPRELIM 8.34*   WBC 11.1*   .0       Recent Labs   Lab 02/12/24  2318   ALT 27   AST 14*       Imaging:   NM BONE WB SPECT/CT (MULTI) 1 DAY (CPT=78306/46271)  Narrative: PROCEDURE:  NM BONE WB SPECT/CT (MULTI) 1 DAY (CPT=78306/99540)     COMPARISON:  [facility] , CT, CT CHEST+ABDOMEN+PELVIS(ALL CNTRST ONLY)(CPT=71260/86966), 1/15/2024, 11:06 AM.     INDICATIONS:  Z17.0 Malignant neoplasm of overlapping sites of left breast in female, estrogen receptor positive  (HCC) C50.812 Malignant neoplasm of overlapping sites of le*     TECHNIQUE:  Technetium 99m MDP was injected intravenously. Whole body planar images were obtained approximately 3 hours later.  Additional dedicated SPECT images were taken with concurrent CT scan for both anatomical localization as well as attenuation   correction. Scan was reformatted into multi-planar reconstructions on  a dedicated workstation.      PHARMACEUTICAL:  Technetium 99m MDP, 25.4 mCi     FINDINGS:    IMAGED AREA:  Whole-body  ABNORMALITIES:  None.  OTHER:  Soft tissue nodule in the lateral aspect of the left breast is noted.  Degenerative changes in the shoulders and knees are noted.  There is scoliosis of the thoracolumbar spine..                   Impression: CONCLUSION:    1. No evidence of osseous metastatic disease in the body.  2. Scoliosis of the thoracolumbar spine is noted.        LOCATION:  [facility]        Dictated by (CST): Heron Altamirano MD on 1/15/2024 at 3:47 PM       Finalized by (CST): Heron Altamirano MD on 1/15/2024 at 3:51 PM     CT CHEST+ABDOMEN+PELVIS(ALL CNTRST ONLY)(PMK=40575/02137)  Narrative: PROCEDURE:  CT CHEST+ABDOMEN+PELVIS(ALL CNTRST ONLY)(CPT=71260/74174)     COMPARISON:  None.     INDICATIONS:  Z17.0 Malignant neoplasm of overlapping sites of left breast in female, estrogen receptor positive  (HCC) C50.812 Malignant neoplasm of overlapping sites of le*     TECHNIQUE:  IV contrast-enhanced scanning through the chest, abdomen, and pelvis was performed.  Dose reduction techniques were used. Dose information is transmitted to the ACR (American College of Radiology) NRDR (National Radiology Data Registry) which   includes the Dose Index Registry.     PATIENT STATED HISTORY:(As transcribed by Technologist)  Left breast cancer      CONTRAST USED:  80cc of Isovue 370     FINDINGS:       CHEST:    LUNGS:  No nodules or infiltrates.  No bronchiectasis.  MEDIASTINUM:  No adenopathy.  PENNY:  No adenopathy.  CARDIAC:  No pericardial effusion.  No significant coronary calcifications.  PLEURA:  No pleural effusion.  CHEST WALL:  There is a left axillary lymph node measuring 1.3 x 1.0 cm.   AORTA:  Normal caliber.  VASCULATURE:  Smooth tapering.     ABDOMEN/PELVIS:  LIVER:  Uniform parenchyma.  BILIARY:  Nondistended gallbladder.  No biliary dilatation.  PANCREAS:  Uniform parenchyma.  No ductal  dilatation.  SPLEEN:  Not enlarged.  KIDNEYS:  Normal anatomic positions.  No hydronephrosis or perinephric stranding.  Uniform parenchyma.  ADRENALS:  Not enlarged.  AORTA:  Smooth tapering.  Patent celiac artery, SMA and KRISTI.  Patent splenic vein and portal vein.  RETROPERITONEUM:  No adenopathy.  BOWEL/MESENTERY:  Normal bowel caliber.  Moderate stool in the colon.  No ascites.  ABDOMINAL WALL:  No hernia.  URINARY BLADDER:  Nondistended.  PELVIC NODES:  None enlarged.  PELVIC ORGANS:  No uterine or ovarian abnormality.  BONES:  Moderate to severe degenerative changes lower cervical spine and L4-L5 facet joints.  Milder changes elsewhere.  Moderate apex left thoracolumbar scoliosis.  Normal vertebral body heights.                      Impression: CONCLUSION:    1. Borderline enlarged left axillary lymph node.  2. No additional metastatic findings in the chest, abdomen or pelvis.  3. Details as above.             LOCATION:  [facility]           Dictated by (CST): Adeel Brand MD on 1/15/2024 at 11:37 AM       Finalized by (CST): Adeel Brand MD on 1/15/2024 at 11:49 AM        2/13 EGD: Findings and Therapeutics:  Esophagus:   Large amounts of white colored food were present in the distal esophagus. The scope was withdrawn and an overtube was placed. Alligator forceps were used to remove the food from the eosphagus, and multiple removals were done. Eventually, without advancing the scope into the stomach, a large piece of food impacted at the GEJ went into the stomach. The GEJ could be traversed easily with the scope after this. At the GEJ and just proximal to this, there was mucosal rent and oozing of blood, and evidence of an esophagitis. No perforation was identified.  Stomach:    The gastric body, antrum, fundus, cardia, and angularis were normal. No ulcers, erosions or masses visualized. Endoscope was placed in a retroflexion view in the stomach. There was  no evidence of a hiatal hernia.  Duodenum:   The entire  examined duodenum was normal.        IMPRESSION:   Ms Euceda is a 48 year old female with a history of breast cancer who presents with dysphagia and odynophagia due to a food impaction, s/p EGD on 2/13 with removal of food bolus, with evidence of distal esophagitis at the site of the impaction. It is unclear if this is due to mucosal damage from the bolus or if she had a stricture there that was dilated from the bolus. Ddx for her symptoms include ring/web/stricture, EOE, or esophagitis/GERD.             PLAN:   -NPO for now  -stat esophogram to rule out perforation  -IV PPI BID, transition to PO bid at discharge   -if no perforation, start sulcralfate 1g qid  -repeat EGD in about 2 months to re-assess esophagus and for esophageal biopsies; if no colonoscopy done for CRC Screening, will recommend this at that time as well         Larry Salamanca MD  10:20 AM  2/13/2024  [facility]  [phone]

## 2024-02-13 NOTE — OPERATIVE REPORT
EGD Operative Report  Patient Name: Harleen Euceda  YOB: 1975  MRN: [id number]  Procedure: Esophagogastroduodenoscopy (EGD)  with food bolus removal  Pre-operative Diagnosis & Indication: dysphagia, odynophagia  Post-operative Diagnosis: food bolus, esophagitis  Attending Endoscopist: Larry Salamanca MD  Informed Consent: The planned procedure(s), the explanation of the procedure, its expected benefits, the potential complications and risks and possible alternatives and their benefits and risks were discussed with the patient or the patient's surrogate. The discussion of risks, not limited to but including bleeding, infection, perforation, adverse effects from anesthesia, need for emergency surgery/prolonged hospitalization,  cardiac arrhythmias,  and aspiration were discussed with patient.  Pt and/or surrogate understood the proposed procedure(s), its risks, benefits and alternatives and wish to proceed with procedure(s). All questions answered in full.  After all questions were answered to their satisfaction, a signed, informed, and witnessed consent was obtained.  Physical Exam: Heart: regular rate and rhythm. No rubs, murmurs, or gallops. Lungs: Clear to auscultation bilaterally. Abdomen: Soft, non-tender, non distended. No rebound tenderness, no guarding.   A TIME OUT WAS COMPLETED prior to the procedure to confirm the patient, procedure(s) and complete endoscopy safety procedure.  Sedation: Monitored Anesthesia Care; ASA class per anesthesiology team   Monitoring: Pulsoximetry, pulse, respirations, and blood pressure , vitals were monitored throughout the entire procedure under monitored anesthesia care.   Procedure: The patient was then brought to the endoscopy suite where his/her pulse, pulse oximetry and blood pressure were monitored. The patient was placed in the left lateral decubitus position and deep sedation was administered. Once adequate sedation was achieved, a bite block was placed and  a lubricated tip of an Olympus video upper endoscope was inserted through the oropharynx and gently manipulated through the esophagus into the stomach and the second portion of the duodenum. Upon withdrawal of the endoscope, careful visualization of the mucosa was performed. The endoscope was then withdrawn into the gastric antrum and placed in a retroflexed position.  The endoscope was then righted, and air was suctioned from the stomach.  The endoscope was then withdrawn from the patient, with careful visual inspection of the mucosa. The patient left the procedure room in stable condition for recovery. Findings and endotherapy as listed below  Toleration: Patient tolerated procedure well   Complications: No immediate complications   Technical Difficulty:  The procedure was not technically difficult   Estimated Blood Loss: Minimal, less than 5mL of estimated blood loss.   Findings and Therapeutics:  Esophagus:   Large amounts of white colored food were present in the distal esophagus. The scope was withdrawn and an overtube was placed. Alligator forceps were used to remove the food from the eosphagus, and multiple removals were done. Eventually, without advancing the scope into the stomach, a large piece of food impacted at the GEJ went into the stomach. The GEJ could be traversed easily with the scope after this. At the GEJ and just proximal to this, there was mucosal rent and oozing of blood, and evidence of an esophagitis. No perforation was identified.  Stomach:    The gastric body, antrum, fundus, cardia, and angularis were normal. No ulcers, erosions or masses visualized. Endoscope was placed in a retroflexion view in the stomach. There was  no evidence of a hiatal hernia.  Duodenum:   The entire examined duodenum was normal.   Recommendations:  Post EGD precautions, watch for bleeding, infection, perforation, adverse drug reactions   Follow-up biopsies  IV PPI BID for now  NPO  Stat esophogram to rule out  perforation  Avoid non-aspirin NSAID  Repeat EGD in 1-2 months to re-assess healing and for esophageal biopsies    Larry Salamanca MD  2/13/2024  7:57 AM

## 2024-02-13 NOTE — ED PROVIDER NOTES
Patient Seen in: [facility] In [place]      History     Chief Complaint   Patient presents with    Nausea/Vomiting/Diarrhea     Stated Complaint: vomiting, dizziness, chills [id number]    Subjective:   HPI    Patient is a 48-year-old female presenting to the ED with food bolus.  The history is obtained from patient who is a good historian.  The patient was eating a pork chop at 2 PM.  Patient states that she took a chunk of meat and felt like it got stuck.  She has had some intermittent issues in the past with potatoes and cauliflower but states that it typically becomes relieved pretty quickly within minutes.  Her symptoms persisted today and she has been unable to tolerate anything orally since the onset of her symptoms.  Initially it felt like it was stuck near the bottom of her throat at the thoracic inlet.  However she feels it has moved slightly distally.  She does have some discomfort in this area with attempted swallowing.  She has had some persistent vomiting since then including her own saliva and secretions.  She has been unable to tolerate anything p.o.  No shortness of breath.    The patient does have a recent diagnosis of breast cancer and is scheduled for a bilateral mastectomy.  She did have a recent CT scan of the chest abdomen and pelvis with borderline enlarged left axillary lymph node without any other additional metastatic findings in the chest, abdomen, or pelvis.  This was performed January 15, 2024.    Objective:   Past Medical History:   Diagnosis Date    Anxiety state     Back problem     Breast cancer (HCC)     Fibroadenoma of left breast 2015    Hx of motion sickness     PONV (postoperative nausea and vomiting)     with last  over sedated and o2 sat dropped    Varicose vein 2015    Visual impairment               Past Surgical History:   Procedure Laterality Date      ,,    EXTRACTION ERUPTED TOOTH/EXR      wisdom teeth     INSERT INTRAUTERINE DEVICE  2011    Mirena insertion    OTHER SURGICAL HISTORY Bilateral 01/22/2018    RLTCS with bilateral Salpingectomies    REMOVE INTRAUTERINE DEVICE  2015    Mirena removal    SKIN SURGERY                  Social History     Socioeconomic History    Marital status:    Tobacco Use    Smoking status: Never    Smokeless tobacco: Never   Vaping Use    Vaping Use: Never used   Substance and Sexual Activity    Alcohol use: Yes     Alcohol/week: 6.0 standard drinks of alcohol     Types: 6 Standard drinks or equivalent per week    Drug use: No   Other Topics Concern    Caffeine Concern Yes     Comment: 3 cups of coffee daily    Exercise No              Review of Systems    Positive for stated complaint: vomiting, dizziness, chills [id number]  Other systems are as noted in HPI.  Constitutional and vital signs reviewed.      All other systems reviewed and negative except as noted above.    Physical Exam     ED Triage Vitals [02/12/24 2116]   BP (!) 169/83   Pulse 78   Resp 18   Temp 99.2 °F (37.3 °C)   Temp src Oral   SpO2 100 %   O2 Device None (Room air)       Current:/81   Pulse 75   Temp 98.4 °F (36.9 °C) (Oral)   Resp 17   Ht 160 cm (5' 3\")   Wt 72.6 kg   LMP 01/29/2024 (Approximate)   SpO2 99%   BMI 28.34 kg/m²         Physical Exam  Vitals and nursing note reviewed.   Constitutional:       General: She is not in acute distress.     Appearance: Normal appearance. She is well-developed. She is not ill-appearing or toxic-appearing.      Comments: Holding garbage bag with saliva.   HENT:      Head: Normocephalic and atraumatic.      Right Ear: External ear normal.      Left Ear: External ear normal.      Mouth/Throat:      Mouth: Mucous membranes are moist.      Pharynx: Oropharynx is clear.   Eyes:      Conjunctiva/sclera: Conjunctivae normal.   Cardiovascular:      Rate and Rhythm: Normal rate and regular rhythm.      Heart sounds: Normal heart sounds.   Pulmonary:      Effort:  Pulmonary effort is normal.      Breath sounds: Normal breath sounds. No stridor.   Musculoskeletal:      Right lower leg: No edema.      Left lower leg: No edema.   Skin:     General: Skin is warm.      Capillary Refill: Capillary refill takes less than 2 seconds.      Findings: No rash.   Neurological:      General: No focal deficit present.      Mental Status: She is alert and oriented to person, place, and time.   Psychiatric:         Mood and Affect: Mood normal.         Behavior: Behavior normal.               ED Course     Labs Reviewed   COMP METABOLIC PANEL (14) - Abnormal; Notable for the following components:       Result Value    Glucose 100 (*)     Creatinine 1.05 (*)     Calculated Osmolality 296 (*)     AST 14 (*)     All other components within normal limits   CBC W/ DIFFERENTIAL - Abnormal; Notable for the following components:    WBC 11.1 (*)     Neutrophil Absolute Prelim 8.34 (*)     Neutrophil Absolute 8.34 (*)     All other components within normal limits   RAPID SARS-COV-2 BY PCR - Normal   CBC WITH DIFFERENTIAL WITH PLATELET    Narrative:     The following orders were created for panel order CBC With Differential With Platelet.  Procedure                               Abnormality         Status                     ---------                               -----------         ------                     CBC W/ DIFFERENTIAL[[id number]]          Abnormal            Final result                 Please view results for these tests on the individual orders.   RAINBOW DRAW BLUE   RAINBOW DRAW GOLD                      MDM      History obtained from patient.     Differential diagnosis includes food bolus, anxiety, not likely secondary to patient's known history of malignancy given recent CT scanning of the chest, abdomen, and pelvis noted below.    Previous records reviewed.The patient does have a recent diagnosis of breast cancer and is scheduled for a bilateral mastectomy.  She did have a recent CT  scan of the chest abdomen and pelvis with borderline enlarged left axillary lymph node without any other additional metastatic findings in the chest, abdomen, or pelvis.  This was performed January 15, 2024.    Testing considered and ordered includes CBC, CMP.  Chest x-ray was not obtained as lungs are clear and patient has no complaints of shortness of breath.  Only has difficulty swallowing due to suspected food bolus.    I reviewed all results.  CMP unremarkable when compared to test results within the last month.  CBC also reviewed.  Mild leukocytosis with WBC of 11.1.  Suspect reactive.    Others who assisted in patient's care included GI with plan for EGD for food bolus causing esophageal obstruction.  Case was discussed with Dr. Brambila.  Would like patient transferred to [place] for admission with plan for scope in the morning.  Patient did receive IV glucagon and Zofran with subsequent p.o. challenge but had emesis within a couple of minutes.        Patient is aware of plan.  States that she will have a ride to bring her to [facility].            Admission disposition: 2/13/2024 12:27 AM                                        Medical Decision Making      Disposition and Plan     Clinical Impression:  1. Esophageal obstruction due to food impaction         Disposition:  Admit  2/13/2024 12:27 am    Follow-up:  No follow-up provider specified.        Medications Prescribed:  There are no discharge medications for this patient.                        Hospital Problems       Present on Admission  Date Reviewed: 1/12/2024            ICD-10-CM Noted POA    * (Principal) Esophageal obstruction due to food impaction T18.128A, W44.F3XA 2/13/2024 Unknown

## 2024-02-14 ENCOUNTER — TELEPHONE (OUTPATIENT)
Facility: LOCATION | Age: 49
End: 2024-02-14

## 2024-02-14 ENCOUNTER — OFFICE VISIT (OUTPATIENT)
Dept: FAMILY MEDICINE CLINIC | Facility: CLINIC | Age: 49
End: 2024-02-14
Payer: COMMERCIAL

## 2024-02-14 ENCOUNTER — TELEPHONE (OUTPATIENT)
Dept: SURGERY | Facility: CLINIC | Age: 49
End: 2024-02-14

## 2024-02-14 ENCOUNTER — TELEPHONE (OUTPATIENT)
Dept: HEMATOLOGY/ONCOLOGY | Facility: HOSPITAL | Age: 49
End: 2024-02-14

## 2024-02-14 ENCOUNTER — PATIENT OUTREACH (OUTPATIENT)
Dept: CASE MANAGEMENT | Age: 49
End: 2024-02-14

## 2024-02-14 VITALS
HEART RATE: 70 BPM | RESPIRATION RATE: 16 BRPM | BODY MASS INDEX: 27.82 KG/M2 | OXYGEN SATURATION: 98 % | DIASTOLIC BLOOD PRESSURE: 68 MMHG | TEMPERATURE: 98 F | SYSTOLIC BLOOD PRESSURE: 138 MMHG | HEIGHT: 63 IN | WEIGHT: 157 LBS

## 2024-02-14 DIAGNOSIS — C50.412 MALIGNANT NEOPLASM OF UPPER-OUTER QUADRANT OF LEFT BREAST IN FEMALE, ESTROGEN RECEPTOR POSITIVE  (HCC): ICD-10-CM

## 2024-02-14 DIAGNOSIS — Z01.818 PREOP EXAMINATION: Primary | ICD-10-CM

## 2024-02-14 DIAGNOSIS — Z17.0 MALIGNANT NEOPLASM OF UPPER-OUTER QUADRANT OF LEFT BREAST IN FEMALE, ESTROGEN RECEPTOR POSITIVE  (HCC): ICD-10-CM

## 2024-02-14 DIAGNOSIS — C50.912 MALIGNANT NEOPLASM OF LEFT FEMALE BREAST, UNSPECIFIED ESTROGEN RECEPTOR STATUS, UNSPECIFIED SITE OF BREAST (HCC): Primary | ICD-10-CM

## 2024-02-14 DIAGNOSIS — F41.9 ANXIETY: ICD-10-CM

## 2024-02-14 PROCEDURE — 3075F SYST BP GE 130 - 139MM HG: CPT | Performed by: FAMILY MEDICINE

## 2024-02-14 PROCEDURE — 3008F BODY MASS INDEX DOCD: CPT | Performed by: FAMILY MEDICINE

## 2024-02-14 PROCEDURE — 3078F DIAST BP <80 MM HG: CPT | Performed by: FAMILY MEDICINE

## 2024-02-14 PROCEDURE — 99215 OFFICE O/P EST HI 40 MIN: CPT | Performed by: FAMILY MEDICINE

## 2024-02-14 RX ORDER — ALPRAZOLAM 0.25 MG/1
0.25 TABLET ORAL 2 TIMES DAILY PRN
Qty: 30 TABLET | Refills: 1 | Status: SHIPPED | OUTPATIENT
Start: 2024-02-14

## 2024-02-14 NOTE — PROGRESS NOTES
TCM chart review.  No TCM as patient was admitted and discharged the same day.  Encounter closing.

## 2024-02-14 NOTE — TELEPHONE ENCOUNTER
Patient called and discussed her surgery being cancelled for 02/21/2024 with Dr. Nicole and Dr. Alonzo and wanted to speak to breast navigators about the situation .  I sent them a message to please call the patient

## 2024-02-14 NOTE — TELEPHONE ENCOUNTER
Called patient in regards to the message received from, Dr. Clinton Del Rio's surgery scheduler, that the patient would like the breast nurse navigators to contact her in regards to being rescheduled with Dr. Proctor instead of Dr. Nicole for her mastectomy surgery since Dr. Nicole is no longer at [facility]. I called the patient and discussed her surgery decision. Answered her questions to the best of my ability. She stated she will proceed with her surgery with Dr. Proctor and Dr. Alonzo scheduled on March 20, 2024 at [facility]. I stated I would call Dr. Proctor's office to have them contact her to schedule an appointment for her with Dr. Proctor. She thanked me for the phone call back and assistance. Pt was provided with the breast nurse navigators contact information and was encouraged to phone with any other questions or concerns.

## 2024-02-14 NOTE — H&P
Preoperative History and Physical Internal Medicine    CC:   Chief Complaint   Patient presents with    Pre-Op Exam     Double mastectomy scheduled on 2/21 but patient received a letter yesterday that stated  has resigned from [facility] 2/14 so she is not sure if surgery will continue with another surgeon or be rescheduled. She has not heard from their office.        Harleen Euceda is 48 year old presenting for a preoperative exam.    This patient is having surgery on date: 2/21/24 for procedure: Bilateral breast mastectomy with lymph node biopsy, left breast with possible axillary lymph node dissection  I'm seeing the patient at the request from : Dr. Nicole (???) because of preop clearance  Reporting mechanism back to the doctor via fax and this note.    HPI:   Ms. Euceda is a pleasant 48-year-old female recently diagnosed with left breast cancer here today for her preop clearance.  Of note she was admitted at [facility] on 2/12/2024 for esophageal obstruction after she had eaten pork chop.  She does not report side effects or complications from surgical procedures that she has had in the past.  When she had her C-sections with her babies prior she has had mild issues from anesthesia.  However when she was admitted previously she did not have any problems with having anesthesia.  No fever no cough no chest pain or shortness of breath no nausea no vomiting no abdominal pain.  No bleeding tendencies.  However she did receive a magdiel that her surgeon had recently resigned and she is worried about this.  She is scheduled next week to have this done.  She has not heard yet who will be performing her surgery.  I had reviewed past medical and family histories together with allergy and medication lists documented.      Past Medical History:   Diagnosis Date    Anxiety state     Back problem     Breast cancer (HCC)     Fibroadenoma of left breast 05/22/2015    Hx of motion sickness     PONV (postoperative  nausea and vomiting)     with last  over sedated and o2 sat dropped    Varicose vein 2015    Visual impairment        Past Surgical History:   Procedure Laterality Date      ,,    EXTRACTION ERUPTED TOOTH/EXR      wisdom teeth    INSERT INTRAUTERINE DEVICE      Mirena insertion    OTHER SURGICAL HISTORY Bilateral 2018    RLTCS with bilateral Salpingectomies    REMOVE INTRAUTERINE DEVICE      Mirena removal    SKIN SURGERY         Social History:  Social History     Socioeconomic History    Marital status:    Tobacco Use    Smoking status: Never    Smokeless tobacco: Never   Vaping Use    Vaping Use: Never used   Substance and Sexual Activity    Alcohol use: Yes     Alcohol/week: 6.0 standard drinks of alcohol     Types: 6 Standard drinks or equivalent per week    Drug use: No   Other Topics Concern    Caffeine Concern Yes     Comment: 3 cups of coffee daily    Exercise No     Social Determinants of Health     Food Insecurity: No Food Insecurity (2024)    Food Insecurity     Food Insecurity: Never true   Transportation Needs: No Transportation Needs (2024)    Transportation Needs     Lack of Transportation: No   Housing Stability: Low Risk  (2024)    Housing Stability     Housing Instability: No       Family History   Problem Relation Age of Onset    Hypertension Father     Diabetes Father     Heart Disorder Father 52        quad bypass    Other (hypothyroid) Father     Breast Cancer Mother 46    Hypertension Mother     Psychiatric Mother         depression    Breast Cancer Maternal Grandmother 63    Other (Cholangiocarinoma) Brother 36       Allergies:  Allergies   Allergen Reactions    Other SHORTNESS OF BREATH     Peonies and lemongrass.       Current Meds:    Current Outpatient Medications:     ALPRAZolam 0.25 MG Oral Tab, Take 1 tablet (0.25 mg total) by mouth 2 (two) times daily as needed., Disp: 30 tablet, Rfl: 1    sucralfate 1 GM/10ML  Oral Suspension, Take 10 mL (1 g total) by mouth 4 (four) times daily before meals and nightly (2200)., Disp: 1200 mL, Rfl: 0    pantoprazole 40 MG Oral Tab EC, Take 1 tablet (40 mg total) by mouth 2 (two) times daily before meals., Disp: 60 tablet, Rfl: 0    Review of Systems   Review of Systems   Constitutional:  Negative for fatigue and fever.   HENT:  Negative for sore throat and trouble swallowing.    Eyes:  Negative for visual disturbance.   Respiratory:  Negative for cough and shortness of breath.    Cardiovascular:  Negative for chest pain.   Gastrointestinal:  Negative for abdominal pain, constipation, diarrhea, nausea and vomiting.   Genitourinary:  Negative for difficulty urinating.   Neurological:  Negative for dizziness and headaches.       Physical Exam   /68   Pulse 70   Temp 98.1 °F (36.7 °C) (Temporal)   Resp 16   Ht 5' 3\" (1.6 m)   Wt 157 lb (71.2 kg)   LMP 01/29/2024 (Approximate)   SpO2 98%   BMI 27.81 kg/m²   Physical Exam  Vitals reviewed.   Constitutional:       General: She is not in acute distress.  HENT:      Right Ear: Tympanic membrane normal.      Left Ear: Tympanic membrane and ear canal normal.      Mouth/Throat:      Mouth: Mucous membranes are moist.      Pharynx: Oropharynx is clear.   Eyes:      General: No scleral icterus.     Conjunctiva/sclera: Conjunctivae normal.   Cardiovascular:      Rate and Rhythm: Normal rate and regular rhythm.      Heart sounds: Normal heart sounds. No murmur heard.  Pulmonary:      Effort: Pulmonary effort is normal. No respiratory distress.      Breath sounds: Normal breath sounds. No wheezing or rales.   Abdominal:      General: Bowel sounds are normal. There is no distension.      Palpations: Abdomen is soft. There is no mass.      Tenderness: There is no abdominal tenderness.   Musculoskeletal:      Cervical back: Neck supple.      Right lower leg: No edema.      Left lower leg: No edema.   Lymphadenopathy:      Cervical: No cervical  adenopathy.   Skin:     General: Skin is warm.   Neurological:      Mental Status: She is alert.   Psychiatric:         Mood and Affect: Mood normal.         Behavior: Behavior normal.         Thought Content: Thought content normal.         Admission on 02/12/2024, Discharged on 02/13/2024   Component Date Value    Glucose 02/12/2024 100 (H)     Sodium 02/12/2024 143     Potassium 02/12/2024 3.7     Chloride 02/12/2024 111     CO2 02/12/2024 25.0     Anion Gap 02/12/2024 7     BUN 02/12/2024 12     Creatinine 02/12/2024 1.05 (H)     Calcium, Total 02/12/2024 9.5     Calculated Osmolality 02/12/2024 296 (H)     eGFR-Cr 02/12/2024 66     AST 02/12/2024 14 (L)     ALT 02/12/2024 27     Alkaline Phosphatase 02/12/2024 80     Bilirubin, Total 02/12/2024 0.6     Total Protein 02/12/2024 8.1     Albumin 02/12/2024 4.2     Globulin  02/12/2024 3.9     A/G Ratio 02/12/2024 1.1     Hold Blue 02/12/2024 Auto Resulted     Hold Gold 02/12/2024 Auto Resulted     WBC 02/12/2024 11.1 (H)     RBC 02/12/2024 5.23     HGB 02/12/2024 15.5     HCT 02/12/2024 46.3     PLT 02/12/2024 350.0     MCV 02/12/2024 88.5     MCH 02/12/2024 29.6     MCHC 02/12/2024 33.5     RDW 02/12/2024 14.0     Neutrophil Absolute Prel* 02/12/2024 8.34 (H)     Neutrophil Absolute 02/12/2024 8.34 (H)     Lymphocyte Absolute 02/12/2024 1.65     Monocyte Absolute 02/12/2024 0.85     Eosinophil Absolute 02/12/2024 0.17     Basophil Absolute 02/12/2024 0.07     Immature Granulocyte Abs* 02/12/2024 0.04     Neutrophil % 02/12/2024 75.1     Lymphocyte % 02/12/2024 14.8     Monocyte % 02/12/2024 7.6     Eosinophil % 02/12/2024 1.5     Basophil % 02/12/2024 0.6     Immature Granulocyte % 02/12/2024 0.4     Rapid SARS-CoV-2 by PCR 02/13/2024 Not Detected     HCG URINE QUALITATIVE 02/13/2024 Negative    [facility] EKG Encounter on 02/05/2024   Component Date Value    Ventricular rate 02/05/2024 64     Atrial rate 02/05/2024 64     P-R Interval 02/05/2024 128     QRS Duration  02/05/2024 84     Q-T Interval 02/05/2024 398     QTC Calculation (Bezet) 02/05/2024 410     P Mcdonald 02/05/2024 58     R Axis 02/05/2024 49     T Axis 02/05/2024 43     Glucose 02/05/2024 96     Sodium 02/05/2024 139     Potassium 02/05/2024 4.0     Chloride 02/05/2024 109     CO2 02/05/2024 26.0     Anion Gap 02/05/2024 4     BUN 02/05/2024 10     Creatinine 02/05/2024 0.90     Calcium, Total 02/05/2024 9.0     Calculated Osmolality 02/05/2024 287     eGFR-Cr 02/05/2024 80     AST 02/05/2024 16     ALT 02/05/2024 21     Alkaline Phosphatase 02/05/2024 74     Bilirubin, Total 02/05/2024 0.4     Total Protein 02/05/2024 7.1     Albumin 02/05/2024 3.8     Globulin  02/05/2024 3.3     A/G Ratio 02/05/2024 1.2     Patient Fasting for CMP? 02/05/2024 No     WBC 02/05/2024 8.5     RBC 02/05/2024 4.74     HGB 02/05/2024 14.3     HCT 02/05/2024 41.9     PLT 02/05/2024 298.0     MCV 02/05/2024 88.4     MCH 02/05/2024 30.2     MCHC 02/05/2024 34.1     RDW 02/05/2024 13.7     Neutrophil Absolute Prel* 02/05/2024 5.44     Neutrophil Absolute 02/05/2024 5.44     Lymphocyte Absolute 02/05/2024 1.73     Monocyte Absolute 02/05/2024 0.70     Eosinophil Absolute 02/05/2024 0.51     Basophil Absolute 02/05/2024 0.07     Immature Granulocyte Abs* 02/05/2024 0.05     Neutrophil % 02/05/2024 64.0     Lymphocyte % 02/05/2024 20.4     Monocyte % 02/05/2024 8.2     Eosinophil % 02/05/2024 6.0     Basophil % 02/05/2024 0.8     Immature Granulocyte % 02/05/2024 0.6    Hospital Outpatient Visit on 01/15/2024   Component Date Value    ISTAT Creatinine 01/15/2024 1.00     eGFR-Cr 01/15/2024 70    [facility] Lab Encounter on 01/15/2024   Component Date Value    Glucose 01/15/2024 87     Sodium 01/15/2024 138     Potassium 01/15/2024 3.9     Chloride 01/15/2024 106     CO2 01/15/2024 27.0     Anion Gap 01/15/2024 5     BUN 01/15/2024 16     Creatinine 01/15/2024 1.07 (H)     Calcium, Total 01/15/2024 9.6     Calculated Osmolality 01/15/2024 287      eGFR-Cr 01/15/2024 65     AST 01/15/2024 11 (L)     ALT 01/15/2024 21     Alkaline Phosphatase 01/15/2024 69     Bilirubin, Total 01/15/2024 0.7     Total Protein 01/15/2024 7.4     Albumin 01/15/2024 4.1     Globulin  01/15/2024 3.3     A/G Ratio 01/15/2024 1.2     Patient Fasting for CMP? 01/15/2024 Yes     Cholesterol, Total 01/15/2024 171     HDL Cholesterol 01/15/2024 61 (H)     Triglycerides 01/15/2024 110     LDL Cholesterol 01/15/2024 90     VLDL 01/15/2024 18     Non HDL Chol 01/15/2024 110     Patient Fasting for Lipi* 01/15/2024 Yes     Free T4 01/15/2024 0.9     TSH 01/15/2024 2.400     WBC 01/15/2024 7.4     RBC 01/15/2024 5.14     HGB 01/15/2024 15.1     HCT 01/15/2024 47.6     PLT 01/15/2024 329.0     MCV 01/15/2024 92.6     MCH 01/15/2024 29.4     MCHC 01/15/2024 31.7     RDW 01/15/2024 13.4     Neutrophil Absolute Prel* 01/15/2024 4.62     Neutrophil Absolute 01/15/2024 4.62     Lymphocyte Absolute 01/15/2024 1.57     Monocyte Absolute 01/15/2024 0.69     Eosinophil Absolute 01/15/2024 0.42     Basophil Absolute 01/15/2024 0.07     Immature Granulocyte Abs* 01/15/2024 0.02     Neutrophil % 01/15/2024 62.6     Lymphocyte % 01/15/2024 21.2     Monocyte % 01/15/2024 9.3     Eosinophil % 01/15/2024 5.7     Basophil % 01/15/2024 0.9     Immature Granulocyte % 01/15/2024 0.3      EKG done on 2/5/2024 reviewed which shows normal sinus rhythm and no ST-T changes.  Preop CBC and CMP were reviewed and are normal.  Assessment and Plan:  Harleen Euceda is a 48 year old female here for   Chief Complaint   Patient presents with    Pre-Op Exam     Double mastectomy scheduled on 2/21 but patient received a letter yesterday that stated  has resigned from [employer] effective 2/14 so she is not sure if surgery will continue with another surgeon or be rescheduled. She has not heard from their office.      1. Preop examination    2. Malignant neoplasm of upper-outer quadrant of left breast in female, estrogen  receptor positive  (HCC)    3. Anxiety    -Will prescribe alprazolam to be taken as needed.  - Keep hydrated  Patient is medically cleared to undergo planned procedure.  He is at low risk for developing perioperative and postoperative cardiac complications.  Please contact my office if questions or concerns. Patient was advised to hold any blood thinners, anti-inflammatories, or supplements that patient may be taking at least 7-10 days prior to procedure.  I will forward my notes to  (???)/[place] for review.   Patient/Caregiver Education: Patient/Caregiver Education: There are no barriers to learning. Medical education done. Outcome: Patient verbalizes understanding.      This note was prepared using Dragon Medical voice recognition dictation software. As a result errors may occur. When identified these errors have been corrected. While every attempt is made to correct errors during dictation discrepancies may still exist          No orders of the defined types were placed in this encounter.    No orders of the defined types were placed in this encounter.    Requested Prescriptions     Signed Prescriptions Disp Refills    ALPRAZolam 0.25 MG Oral Tab 30 tablet 1     Sig: Take 1 tablet (0.25 mg total) by mouth 2 (two) times daily as needed.     OFFICE/OUTPT VISIT,EST,LEVL V

## 2024-02-14 NOTE — TELEPHONE ENCOUNTER
Patient called Nurse Navigator Harleen, who says patient is okay with surgery on 3/20 with Dr. Proctor.   Scheduled preop appointment.     Future Appointments   Date Time Provider Department Center   3/5/2024  4:00 PM Joseph Proctor DO [id number]   3/20/2024 10:00 AM  [facility]  [facility]

## 2024-02-14 NOTE — PATIENT INSTRUCTIONS
Thank you for choosing Ezra Jones MD at [facility]  To Do: Harleen Euceda  1. Considerations in the Preoperative period:   Surgery is a big deal.  Anesthesia and your medicines and medical issues all stress out your body.  Therefore read about the below issues and ask questions.(Certain exceptions exist depending on your medical history, call the doctor back with questions or contact your specialist doctors as well)    Medications:  Medications that thin the blood pose a bleeding risk- No NSAIDS/nonsteroidal anti inflammatory drugs- meaning motrin, advil, ibuprofen, alleve, naproxen, or aspirin 1 week prior to surgery, but acetaminophen/tylenol for pain is ok.      Herbals: Ephedra, garlic, ginkgo, ginseng, kava, St. Johns Wort, and Valerian, should be held 3-5 days prior to surgery.    Cardiovascular agents:   Ace Inhibitors (lisinopril, benazepril, ramipril etc.) or ARBs (diovan, valsartan, losartan, avapro etc.) should be held night before surgery if BP is good.    Non statin cholesterol agents (fenofibrate, fish oils, niacin, zetia) held day before surgery.  Statins are continued during surgery.    Diuretics hold on day of surgery.    Endocrine Hormonal Agents:  Oral Contraception, Birth Control, Hormone replacement therapy, and Estrogen Receptor Blockers (raloxifene, tamoxifen)- Hold 4-6 weeks before high risk surgeries with high risk of blood clots (venous thromboembolism)    Insulin and steroids- case by case basis- but your doses may have to be adjusted sp talk to the doctor about it.  Short acting insulin novolog humalog is held during surgery, Long acting is typically decreased before and during surgery.    Blood Thinners:  Plavix, clopidegrel, prasugrel, ticagrelor, effient etc- held 7 days before unless directed differently by cardiology    Coumadin, or novel anticoagulants such as pradaxa, eliquis, xarelto etc.- discuss with your doctor or cardiologist    Psychotropic agents:  Serotinin  reuptake inhibitors like zoloft, effexor, paxil, prozac, citalopram, remeron etc. For mood should be held 3 weeks before surgery if high risk of bleeding as these may increase risk of bleeding  Most other psychiatric meds like antipsychotic meds, anxiety meds such as xanax, clonopin can be continued    Opioids: Will affect anesthesia and pain management discuss with the anesthesiologist.    Antiretroviral agents such as those used for HIV need to be discussed    Neurologic Agents:  Patients with medications for seizures, Parkinson's, delirium, or Myasthenia gravis pose risks that need to be discussed with the doctor.    Rheumatologic Immune Agents: Talk to your specialist doctor because  Sulfasalazine, azathioprine held 1 week before surgery  Leflunomide is held 2 weeks before surgery  etanercept, infliximab, anakinra, rituximab, adalimumab - held before surgery talk to the doctor about these  Gout meds such as allopurinol, colchicine are held on morning of surgery.    Medical Conditions needing evaluation and discussion with your doctors and specialists:  Cardiac conditions such as Blood pressure, coronary artery disease, heart failure, or irregular heart beats.    Pulmonary conditions such as asthma, COPD, or sleep apnea    Hormonal conditions such as diabetes and hormone replacement see above    Neurologic conditions as noted above Parkinsons, Myasthenia gravis, seizures to name a few need to have special consideration    Rheumatologic/Autoimmune disease need to be specially addressed and any condition that would impair healing such as HIV or history of splenectomy   [facility] is located in [address].  Monday, Tuesday & Friday - 8 a.m. to 4 p.m.  Wednesday, Thursday - 7 a.m. to 3 p.m.  The lab is closed daily from 12 p.m.-12:30 p.m.  Saturday lab hours by appointment. Call [phone] to schedule the appointment.   Please signup for MyChart, which is electronic access to your record if you have not  done so.  All your results will post on there.  [email or web address]   You can NOW use [facility] to book your appointments with us, or consider using open access scheduling which is through the [facility] website [email or web address] and type in Ezra Jones MD and follow the links for \"Schedule Online Now\"    To schedule Imaging or tests at [facility] call Central Scheduling [phone], Go to [facility] A ER Building (For example: CT scans, X rays, Ultrasound, MRI)  Cardiac Testing in ER building Building A second floor Cardiac Testing [phone] (For example: Holter Monitor, Cardiac Stress tests,Event Monitor, or 2D Echocardiograms)  [facility] call [phone] usually in [facility] A  Walk in Clinic in [place] at [address] Mon-Fri at 8am-7:30 p.m., and Sat/Sun 9:00a.m.-4:30 p.m.  Also at [address] [place] [address]  Call [phone] for info     Please call our office about any questions regarding your treatment/medicines/tests as a result of today's visit.  For your safety, read the entire package insert of all medicines prescribed to you and be aware of all of the risks of treatment even beyond those discussed today.  All therapies have potential risk of harm or side effects or medication interactions.  It is your duty and for your safety to discuss with the pharmacist and our office with questions, and to notify us and stop treatment if problems arise, but know that our intention is that the benefits outweigh those potential risks and we strive to make you healthier and to improve your quality of life.    Referrals, and Radiology Information:    If your insurance requires a referral to a specialist, please allow 5 business days to process your referral request.    If Ezra Jones MD orders a CT or MRI, it may take up to 10 business days to receive approval from your insurance company. Once our office has called informing you that the insurance company approved your testing, please  call Central Scheduling at [phone]  Please allow our office 5 business days to contact you regarding any testing results.    Refill policies:   Allow 3 business days for refills; controlled substances may take longer and must be picked up from the office in person.  Narcotic medications can only be filled in 30 day increments and must be refilled at an office visit only.  If your prescription is due for a refill, you may be due for a follow-up appointment.  We cannot refill your maintenance medications at a preventative wellness visit.  To best provide you care, patients receiving maintenance medications need to be seen at least twice a year.

## 2024-02-20 ENCOUNTER — TELEPHONE (OUTPATIENT)
Dept: FAMILY MEDICINE CLINIC | Facility: CLINIC | Age: 49
End: 2024-02-20

## 2024-02-20 ENCOUNTER — TELEPHONE (OUTPATIENT)
Dept: SURGERY | Facility: CLINIC | Age: 49
End: 2024-02-20

## 2024-02-20 DIAGNOSIS — Z01.818 PREOP EXAMINATION: Primary | ICD-10-CM

## 2024-02-20 DIAGNOSIS — Z01.812 PRE-OPERATIVE LABORATORY EXAMINATION: ICD-10-CM

## 2024-02-20 NOTE — TELEPHONE ENCOUNTER
Called Dr. Jones, talked to Shelbi and she will relay the message regarding medical clearance and labs will have to be redone due to the fact that they where done  to soon and not within the 30days of surgery. Shelbi will have RN call us back.

## 2024-02-20 NOTE — TELEPHONE ENCOUNTER
Office calling to request medical clearance for patient, patient having surgery on 3-20.  Wanted to inform PCP of poss abnormal CBC, need to redo? Also office needs labs to be done within 30 days prior to surgery, patient did labs too early-will have to repeat labs. Can fax to [phone]

## 2024-02-22 ENCOUNTER — TELEPHONE (OUTPATIENT)
Dept: FAMILY MEDICINE CLINIC | Facility: CLINIC | Age: 49
End: 2024-02-22

## 2024-02-22 NOTE — TELEPHONE ENCOUNTER
Pt was here on 2/14/24 for a pre-op and her surgery was cancelled.  She said it has been rescheduled to 3/20/24 and wants to know if the pre-op from 2/14/24 can be addend?  She said she will also need to have her labs ordered and re-done.

## 2024-02-23 ENCOUNTER — APPOINTMENT (OUTPATIENT)
Dept: ADMINISTRATIVE | Facility: HOSPITAL | Age: 49
End: 2024-02-23
Payer: COMMERCIAL

## 2024-02-23 RX ORDER — DIAZEPAM 5 MG
5 TABLET ORAL AS NEEDED
Status: CANCELLED | OUTPATIENT
Start: 2024-02-23

## 2024-03-05 ENCOUNTER — OFFICE VISIT (OUTPATIENT)
Facility: LOCATION | Age: 49
End: 2024-03-05
Payer: COMMERCIAL

## 2024-03-05 VITALS — TEMPERATURE: 97 F | HEART RATE: 69 BPM

## 2024-03-05 DIAGNOSIS — C50.912 MALIGNANT NEOPLASM OF LEFT FEMALE BREAST, UNSPECIFIED ESTROGEN RECEPTOR STATUS, UNSPECIFIED SITE OF BREAST (HCC): Primary | ICD-10-CM

## 2024-03-05 PROCEDURE — 99214 OFFICE O/P EST MOD 30 MIN: CPT | Performed by: SURGERY

## 2024-03-05 NOTE — H&P
Harleen Euceda is a 48 year old female  Chief Complaint   Patient presents with    New Patient     NP- 3/20 BILATERAL BREAST MASTECTOMY, LEFT BREAST SENTINEL LYMPH NODE BIOPSY, POSSIBLE TOTAL AXILLARY LYMPH NODE DISSECTION, INJECTION OF BLUE DYE (SHEKHAR) Bilateral breast skin envelope reduction (MAYNOR).        REFERRED BY    Patient presents with new diagnosis of invasive lobular carcinoma of the left breast.  Patient initially underwent core needle biopsy of the left breast for a imaging finding of a 5 mm lesion.  This demonstrated invasive lobular carcinoma.  Genetic testing was negative although the patient has a strong family history of breast cancer in her mother and second-degree relatives.  Her mother was diagnosed at age 47.  Patient has noticed a slow steady fullness and increasing size of her left breast upper outer quadrant for the past 6 months.  Subsequent MRI demonstrated evidence of a 11 cm enhancements consistent with carcinoma.  Patient has seen Dr. May she is currently scheduled for bilateral mastectomy with reconstruction.     .           Past Medical History:   Diagnosis Date    Anxiety state     Back problem     Breast cancer (HCC)     Esophageal obstruction due to food impaction 2024    Fibroadenoma of left breast 2015    Hx of motion sickness     PONV (postoperative nausea and vomiting)     with last  over sedated and o2 sat dropped    Varicose vein 2015    Visual impairment      Past Surgical History:   Procedure Laterality Date      ,,    EXTRACTION ERUPTED TOOTH/EXR      wisdom teeth    INSERT INTRAUTERINE DEVICE      Mirena insertion    OTHER SURGICAL HISTORY Bilateral 2018    RLTCS with bilateral Salpingectomies    REMOVE INTRAUTERINE DEVICE      Mirena removal    SKIN SURGERY       Current Outpatient Medications on File Prior to Visit   Medication Sig Dispense Refill    ALPRAZolam 0.25 MG Oral Tab Take 1 tablet (0.25 mg  total) by mouth 2 (two) times daily as needed. (Patient not taking: Reported on 2/23/2024) 30 tablet 1    sucralfate 1 GM/10ML Oral Suspension Take 10 mL (1 g total) by mouth 4 (four) times daily before meals and nightly (2200). 1200 mL 0    pantoprazole 40 MG Oral Tab EC Take 1 tablet (40 mg total) by mouth 2 (two) times daily before meals. 60 tablet 0     No current facility-administered medications on file prior to visit.     @ALL@  Family History   Problem Relation Age of Onset    Hypertension Father     Diabetes Father     Heart Disorder Father 52        quad bypass    Other (hypothyroid) Father     Breast Cancer Mother 46    Hypertension Mother     Psychiatric Mother         depression    Breast Cancer Maternal Grandmother 63    Other (Cholangiocarinoma) Brother 36     Social History     Socioeconomic History    Marital status:    Tobacco Use    Smoking status: Never    Smokeless tobacco: Never   Vaping Use    Vaping Use: Never used   Substance and Sexual Activity    Alcohol use: Yes     Alcohol/week: 6.0 standard drinks of alcohol     Types: 6 Standard drinks or equivalent per week    Drug use: No   Other Topics Concern    Caffeine Concern Yes     Comment: 3 cups of coffee daily    Exercise No     Social Determinants of Health     Food Insecurity: No Food Insecurity (2/13/2024)    Food Insecurity     Food Insecurity: Never true   Transportation Needs: No Transportation Needs (2/13/2024)    Transportation Needs     Lack of Transportation: No   Housing Stability: Low Risk  (2/13/2024)    Housing Stability     Housing Instability: No       ROS     GENERAL HEALTH: otherwise feels well, no weight loss, no fever or chills  SKIN: denies any unusual skin rashes or jaundice  HEENT: denies nasal congestion, sinus pain or sore throat; hearing loss negative,   EYES , no diplopia or vision changes  RESPIRATORY: denies shortness of breath, wheezing or cough   CARDIOVASCULAR: denies chest pain or FREEMAN; no  palpitations   GI: denies nausea, vomiting, constipation, diarrhea; no rectal bleeding; no heartburn  GENITAL/: no dysuria, urgency or frequency, no tea colored urine  MUSCULOSKELETAL: no joint complaints upper or lower extremities  HEMATOLOGY: denies hx anemia; denies bruising or excessive bleeding  Endocrine: no weight gain or loss no hot or cold intolerance    EXAM     Pulse 69, temperature 97.1 °F (36.2 °C), last menstrual period 02/04/2024, not currently breastfeeding.  GENERAL: well developed, well nourished female, in no apparent distress.    MENTAL STATUS :Alert, oriented x 3  PSYCH: normal mood and affect  SKIN: anicteric, no rashes, no bruising  EYES: PERRLA, EOMI, sclera anicteric,  conjunctiva without pallor  HEENT: normocephalic, atraumatic, TMs clear, nares patent, mouth moist, pharynx w/o erythema  NECK: supple, no JVD, no adenopathy, no thyromegaly  RESPIRATORY: clear to auscultation, no rales , rhonchi or wheezing  CARDIOVASCULAR: RRR, murmur negative  ABDOMEN: normal active BS, soft, nondistended  no HSM, no masses or hernias  LYMPHATIC: no axillary , supraclavicular or inguinal lymphadenopathy  EXTREMITIES: no cyanosis, clubbing or edema, no atrophy, full ROM  [facility] demonstrates fullness of the left upper outer quadrant of the breast right breast examination is normal supraclavicular and axillary lymph examination normal  STUDIES:     Admission on 02/12/2024, Discharged on 02/13/2024   Component Date Value Ref Range Status    Glucose 02/12/2024 100 (H)  70 - 99 mg/dL Final    Sodium 02/12/2024 143  136 - 145 mmol/L Final    Potassium 02/12/2024 3.7  3.5 - 5.1 mmol/L Final    Chloride 02/12/2024 111  98 - 112 mmol/L Final    CO2 02/12/2024 25.0  21.0 - 32.0 mmol/L Final    Anion Gap 02/12/2024 7  0 - 18 mmol/L Final    BUN 02/12/2024 12  9 - 23 mg/dL Final    Creatinine 02/12/2024 1.05 (H)  0.55 - 1.02 mg/dL Final    Calcium, Total 02/12/2024 9.5  8.5 - 10.1  mg/dL Final    Calculated Osmolality 02/12/2024 296 (H)  275 - 295 mOsm/kg Final    eGFR-Cr 02/12/2024 66  >=60 mL/min/1.73m2 Final    AST 02/12/2024 14 (L)  15 - 37 U/L Final    ALT 02/12/2024 27  13 - 56 U/L Final    Alkaline Phosphatase 02/12/2024 80  39 - 100 U/L Final    Bilirubin, Total 02/12/2024 0.6  0.1 - 2.0 mg/dL Final    Total Protein 02/12/2024 8.1  6.4 - 8.2 g/dL Final    Albumin 02/12/2024 4.2  3.4 - 5.0 g/dL Final    Globulin  02/12/2024 3.9  2.8 - 4.4 g/dL Final    A/G Ratio 02/12/2024 1.1  1.0 - 2.0 Final    Hold Blue 02/12/2024 Auto Resulted   Final    Hold Gold 02/12/2024 Auto Resulted   Final    WBC 02/12/2024 11.1 (H)  4.0 - 11.0 x10(3) uL Final    RBC 02/12/2024 5.23  3.80 - 5.30 x10(6)uL Final    HGB 02/12/2024 15.5  12.0 - 16.0 g/dL Final    HCT 02/12/2024 46.3  35.0 - 48.0 % Final    PLT 02/12/2024 350.0  150.0 - 450.0 10(3)uL Final    MCV 02/12/2024 88.5  80.0 - 100.0 fL Final    MCH 02/12/2024 29.6  26.0 - 34.0 pg Final    MCHC 02/12/2024 33.5  31.0 - 37.0 g/dL Final    RDW 02/12/2024 14.0  % Final    Neutrophil Absolute Prelim 02/12/2024 8.34 (H)  1.50 - 7.70 x10 (3) uL Final    Neutrophil Absolute 02/12/2024 8.34 (H)  1.50 - 7.70 x10(3) uL Final    Lymphocyte Absolute 02/12/2024 1.65  1.00 - 4.00 x10(3) uL Final    Monocyte Absolute 02/12/2024 0.85  0.10 - 1.00 x10(3) uL Final    Eosinophil Absolute 02/12/2024 0.17  0.00 - 0.70 x10(3) uL Final    Basophil Absolute 02/12/2024 0.07  0.00 - 0.20 x10(3) uL Final    Immature Granulocyte Absolute 02/12/2024 0.04  0.00 - 1.00 x10(3) uL Final    Neutrophil % 02/12/2024 75.1  % Final    Lymphocyte % 02/12/2024 14.8  % Final    Monocyte % 02/12/2024 7.6  % Final    Eosinophil % 02/12/2024 1.5  % Final    Basophil % 02/12/2024 0.6  % Final    Immature Granulocyte % 02/12/2024 0.4  % Final    Rapid SARS-CoV-2 by PCR 02/13/2024 Not Detected  Not Detected Final    This test is intended for the qualitative detection of nucleic acid from the SARS-CoV-2  viral RNA from individuals who are suspected of COVID-19 infection by their healthcare provider.    A \"Detected\" result is considered a positive test result for COVID-19.   A \"Not Detected\" result for this test means that SARS-CoV-2 RNA was not present in the sample above the limit of detection of the assay.    Test performed using the Abbott ID NOW COVID-19 assay performed on the ID NOW Instrument, [employer] [place], [place] [address].    This test is being used under the Food and Drug Administration's Emergency Use Authorization.    The authorized Fact Sheet for Healthcare Providers for this assay is available upon request from the laboratory.    [facility]   [address] [place] [id number]   Phone: [phone] Fax: [phone]  CLIA # [id number]     HCG URINE QUALITATIVE 02/13/2024 Negative  Negative Final       ASSESSMENT   Imp: MRI history and physical examination consistent with a 11 cm tumor of the left upper outer quadrant.  I do agree with the recommendation for mastectomy, patient is opting for bilateral with left sentinel lymph node biopsy possible axillary lymph node dissection lengthy discussion with the patient regarding the risks of the procedure to include bleeding infection Flap necrosis seroma formation and limb edema.  She is also aware of the possibility that final pathology does not demonstrate 11 cm tumor however the MRI is highly suspicious for tumor the size.  Patient is opting for bilateral mastectomy also based on the fact of her strong family history of breast cancer although her genetic testing is negative.  PLan:      Meds & Refills for this Visit:  Requested Prescriptions      No prescriptions requested or ordered in this encounter       Imaging & Consults:  None

## 2024-03-07 ENCOUNTER — TELEPHONE (OUTPATIENT)
Facility: LOCATION | Age: 49
End: 2024-03-07

## 2024-03-11 ENCOUNTER — LAB ENCOUNTER (OUTPATIENT)
Dept: LAB | Age: 49
End: 2024-03-11
Attending: FAMILY MEDICINE
Payer: COMMERCIAL

## 2024-03-11 DIAGNOSIS — Z01.812 PRE-OPERATIVE LABORATORY EXAMINATION: ICD-10-CM

## 2024-03-11 DIAGNOSIS — Z01.818 PRE-OP TESTING: ICD-10-CM

## 2024-03-11 LAB
ALBUMIN SERPL-MCNC: 3.9 G/DL (ref 3.4–5)
ALBUMIN/GLOB SERPL: 1.1 {RATIO} (ref 1–2)
ALP LIVER SERPL-CCNC: 75 U/L
ALT SERPL-CCNC: 19 U/L
ANION GAP SERPL CALC-SCNC: 3 MMOL/L (ref 0–18)
AST SERPL-CCNC: 13 U/L (ref 15–37)
BASOPHILS # BLD AUTO: 0.06 X10(3) UL (ref 0–0.2)
BASOPHILS NFR BLD AUTO: 0.8 %
BILIRUB SERPL-MCNC: 0.5 MG/DL (ref 0.1–2)
BUN BLD-MCNC: 15 MG/DL (ref 9–23)
CALCIUM BLD-MCNC: 9.1 MG/DL (ref 8.5–10.1)
CHLORIDE SERPL-SCNC: 110 MMOL/L (ref 98–112)
CO2 SERPL-SCNC: 28 MMOL/L (ref 21–32)
CREAT BLD-MCNC: 1.28 MG/DL
EGFRCR SERPLBLD CKD-EPI 2021: 52 ML/MIN/1.73M2 (ref 60–?)
EOSINOPHIL # BLD AUTO: 0.57 X10(3) UL (ref 0–0.7)
EOSINOPHIL NFR BLD AUTO: 7.6 %
ERYTHROCYTE [DISTWIDTH] IN BLOOD BY AUTOMATED COUNT: 14.3 %
FASTING STATUS PATIENT QL REPORTED: YES
GLOBULIN PLAS-MCNC: 3.4 G/DL (ref 2.8–4.4)
GLUCOSE BLD-MCNC: 85 MG/DL (ref 70–99)
HCG UR QL: NEGATIVE
HCT VFR BLD AUTO: 44.9 %
HGB BLD-MCNC: 15 G/DL
IMM GRANULOCYTES # BLD AUTO: 0.05 X10(3) UL (ref 0–1)
IMM GRANULOCYTES NFR BLD: 0.7 %
LYMPHOCYTES # BLD AUTO: 1.98 X10(3) UL (ref 1–4)
LYMPHOCYTES NFR BLD AUTO: 26.5 %
MCH RBC QN AUTO: 30.1 PG (ref 26–34)
MCHC RBC AUTO-ENTMCNC: 33.4 G/DL (ref 31–37)
MCV RBC AUTO: 90 FL
MONOCYTES # BLD AUTO: 0.74 X10(3) UL (ref 0.1–1)
MONOCYTES NFR BLD AUTO: 9.9 %
NEUTROPHILS # BLD AUTO: 4.06 X10 (3) UL (ref 1.5–7.7)
NEUTROPHILS # BLD AUTO: 4.06 X10(3) UL (ref 1.5–7.7)
NEUTROPHILS NFR BLD AUTO: 54.5 %
OSMOLALITY SERPL CALC.SUM OF ELEC: 292 MOSM/KG (ref 275–295)
PLATELET # BLD AUTO: 278 10(3)UL (ref 150–450)
POTASSIUM SERPL-SCNC: 4.2 MMOL/L (ref 3.5–5.1)
PROT SERPL-MCNC: 7.3 G/DL (ref 6.4–8.2)
RBC # BLD AUTO: 4.99 X10(6)UL
SODIUM SERPL-SCNC: 141 MMOL/L (ref 136–145)
WBC # BLD AUTO: 7.5 X10(3) UL (ref 4–11)

## 2024-03-11 PROCEDURE — 80053 COMPREHEN METABOLIC PANEL: CPT | Performed by: FAMILY MEDICINE

## 2024-03-11 PROCEDURE — 85025 COMPLETE CBC W/AUTO DIFF WBC: CPT | Performed by: FAMILY MEDICINE

## 2024-03-11 PROCEDURE — 81025 URINE PREGNANCY TEST: CPT | Performed by: FAMILY MEDICINE

## 2024-03-12 ENCOUNTER — TELEPHONE (OUTPATIENT)
Dept: FAMILY MEDICINE CLINIC | Facility: CLINIC | Age: 49
End: 2024-03-12

## 2024-03-12 NOTE — TELEPHONE ENCOUNTER
Pt calling as her surgery was rescheduled to March 20, 2024. Pt had labs redone. Pt was seen in February for a pre-op. Can Dr. Jones addend the note so that she is cleared again or does pt have to be seen?

## 2024-03-19 ENCOUNTER — ANESTHESIA EVENT (OUTPATIENT)
Dept: SURGERY | Facility: HOSPITAL | Age: 49
End: 2024-03-19
Payer: COMMERCIAL

## 2024-03-20 ENCOUNTER — HOSPITAL ENCOUNTER (OUTPATIENT)
Facility: HOSPITAL | Age: 49
Setting detail: HOSPITAL OUTPATIENT SURGERY
Discharge: HOME | End: 2024-03-20
Attending: SURGERY | Admitting: SURGERY
Payer: COMMERCIAL

## 2024-03-20 ENCOUNTER — HOSPITAL ENCOUNTER (OUTPATIENT)
Dept: NUCLEAR MEDICINE | Facility: HOSPITAL | Age: 49
Discharge: HOME | End: 2024-03-20
Attending: SURGERY
Payer: COMMERCIAL

## 2024-03-20 ENCOUNTER — ANESTHESIA (OUTPATIENT)
Dept: SURGERY | Facility: HOSPITAL | Age: 49
End: 2024-03-20
Payer: COMMERCIAL

## 2024-03-20 VITALS
TEMPERATURE: 98 F | OXYGEN SATURATION: 100 % | HEART RATE: 89 BPM | BODY MASS INDEX: 28.17 KG/M2 | HEIGHT: 63 IN | SYSTOLIC BLOOD PRESSURE: 120 MMHG | WEIGHT: 159 LBS | DIASTOLIC BLOOD PRESSURE: 81 MMHG | RESPIRATION RATE: 16 BRPM

## 2024-03-20 DIAGNOSIS — C50.912 MALIGNANT NEOPLASM OF LEFT FEMALE BREAST, UNSPECIFIED ESTROGEN RECEPTOR STATUS, UNSPECIFIED SITE OF BREAST (HCC): ICD-10-CM

## 2024-03-20 DIAGNOSIS — Z17.0 MALIGNANT NEOPLASM OF UPPER-OUTER QUADRANT OF LEFT BREAST IN FEMALE, ESTROGEN RECEPTOR POSITIVE (HCC): Primary | ICD-10-CM

## 2024-03-20 DIAGNOSIS — C50.412 MALIGNANT NEOPLASM OF UPPER-OUTER QUADRANT OF LEFT BREAST IN FEMALE, ESTROGEN RECEPTOR POSITIVE (HCC): Primary | ICD-10-CM

## 2024-03-20 LAB — B-HCG UR QL: NEGATIVE

## 2024-03-20 PROCEDURE — 0HTV0ZZ RESECTION OF BILATERAL BREAST, OPEN APPROACH: ICD-10-PCS | Performed by: SURGERY

## 2024-03-20 PROCEDURE — 76942 ECHO GUIDE FOR BIOPSY: CPT | Performed by: ANESTHESIOLOGY

## 2024-03-20 PROCEDURE — 88331 PATH CONSLTJ SURG 1 BLK 1SPC: CPT | Performed by: SURGERY

## 2024-03-20 PROCEDURE — 88344 IMHCHEM/IMCYTCHM EA MLT ANTB: CPT | Performed by: SURGERY

## 2024-03-20 PROCEDURE — 78195 LYMPH SYSTEM IMAGING: CPT | Performed by: SURGERY

## 2024-03-20 PROCEDURE — 0HQCXZZ REPAIR LEFT UPPER ARM SKIN, EXTERNAL APPROACH: ICD-10-PCS | Performed by: SURGERY

## 2024-03-20 PROCEDURE — 0HX5XZZ TRANSFER CHEST SKIN, EXTERNAL APPROACH: ICD-10-PCS | Performed by: SURGERY

## 2024-03-20 PROCEDURE — 88307 TISSUE EXAM BY PATHOLOGIST: CPT | Performed by: SURGERY

## 2024-03-20 PROCEDURE — 3E013KZ INTRODUCTION OF OTHER DIAGNOSTIC SUBSTANCE INTO SUBCUTANEOUS TISSUE, PERCUTANEOUS APPROACH: ICD-10-PCS | Performed by: SURGERY

## 2024-03-20 PROCEDURE — 81025 URINE PREGNANCY TEST: CPT

## 2024-03-20 PROCEDURE — 88342 IMHCHEM/IMCYTCHM 1ST ANTB: CPT | Performed by: SURGERY

## 2024-03-20 PROCEDURE — 07B60ZX EXCISION OF LEFT AXILLARY LYMPHATIC, OPEN APPROACH, DIAGNOSTIC: ICD-10-PCS | Performed by: SURGERY

## 2024-03-20 RX ORDER — SCOLOPAMINE TRANSDERMAL SYSTEM 1 MG/1
1 PATCH, EXTENDED RELEASE TRANSDERMAL ONCE
Status: DISCONTINUED | OUTPATIENT
Start: 2024-03-20 | End: 2024-03-20 | Stop reason: HOSPADM

## 2024-03-20 RX ORDER — HYDROMORPHONE HYDROCHLORIDE 1 MG/ML
0.2 INJECTION, SOLUTION INTRAMUSCULAR; INTRAVENOUS; SUBCUTANEOUS EVERY 5 MIN PRN
Status: DISCONTINUED | OUTPATIENT
Start: 2024-03-20 | End: 2024-03-20

## 2024-03-20 RX ORDER — DIPHENHYDRAMINE HYDROCHLORIDE 50 MG/ML
12.5 INJECTION INTRAMUSCULAR; INTRAVENOUS AS NEEDED
Status: DISCONTINUED | OUTPATIENT
Start: 2024-03-20 | End: 2024-03-20

## 2024-03-20 RX ORDER — LIDOCAINE/PRILOCAINE 2.5 %-2.5%
CREAM (GRAM) TOPICAL ONCE
Status: COMPLETED | OUTPATIENT
Start: 2024-03-20 | End: 2024-03-20

## 2024-03-20 RX ORDER — ONDANSETRON 2 MG/ML
INJECTION INTRAMUSCULAR; INTRAVENOUS AS NEEDED
Status: DISCONTINUED | OUTPATIENT
Start: 2024-03-20 | End: 2024-03-20 | Stop reason: SURG

## 2024-03-20 RX ORDER — PROCHLORPERAZINE EDISYLATE 5 MG/ML
5 INJECTION INTRAMUSCULAR; INTRAVENOUS EVERY 8 HOURS PRN
Status: DISCONTINUED | OUTPATIENT
Start: 2024-03-20 | End: 2024-03-20

## 2024-03-20 RX ORDER — MIDAZOLAM HYDROCHLORIDE 1 MG/ML
1 INJECTION INTRAMUSCULAR; INTRAVENOUS EVERY 5 MIN PRN
Status: DISCONTINUED | OUTPATIENT
Start: 2024-03-20 | End: 2024-03-20

## 2024-03-20 RX ORDER — ONDANSETRON 2 MG/ML
4 INJECTION INTRAMUSCULAR; INTRAVENOUS EVERY 6 HOURS PRN
Status: DISCONTINUED | OUTPATIENT
Start: 2024-03-20 | End: 2024-03-20

## 2024-03-20 RX ORDER — DOCUSATE SODIUM 100 MG/1
100 CAPSULE, LIQUID FILLED ORAL 2 TIMES DAILY
Qty: 30 CAPSULE | Refills: 1 | Status: SHIPPED | OUTPATIENT
Start: 2024-03-20 | End: 2024-07-09

## 2024-03-20 RX ORDER — GLYCOPYRROLATE 0.2 MG/ML
INJECTION, SOLUTION INTRAMUSCULAR; INTRAVENOUS AS NEEDED
Status: DISCONTINUED | OUTPATIENT
Start: 2024-03-20 | End: 2024-03-20 | Stop reason: SURG

## 2024-03-20 RX ORDER — HYDROMORPHONE HYDROCHLORIDE 1 MG/ML
0.6 INJECTION, SOLUTION INTRAMUSCULAR; INTRAVENOUS; SUBCUTANEOUS EVERY 5 MIN PRN
Status: DISCONTINUED | OUTPATIENT
Start: 2024-03-20 | End: 2024-03-20

## 2024-03-20 RX ORDER — LIDOCAINE HYDROCHLORIDE 10 MG/ML
INJECTION, SOLUTION EPIDURAL; INFILTRATION; INTRACAUDAL; PERINEURAL AS NEEDED
Status: DISCONTINUED | OUTPATIENT
Start: 2024-03-20 | End: 2024-03-20 | Stop reason: SURG

## 2024-03-20 RX ORDER — CEFAZOLIN SODIUM/WATER 2 G/20 ML
2 SYRINGE (ML) INTRAVENOUS ONCE
Status: COMPLETED | OUTPATIENT
Start: 2024-03-20 | End: 2024-03-20

## 2024-03-20 RX ORDER — HYDROCODONE BITARTRATE AND ACETAMINOPHEN 5; 325 MG/1; MG/1
1-2 TABLET ORAL EVERY 4 HOURS PRN
Qty: 30 TABLET | Refills: 0 | Status: ON HOLD | OUTPATIENT
Start: 2024-03-20 | End: 2024-03-28

## 2024-03-20 RX ORDER — HYDROMORPHONE HYDROCHLORIDE 1 MG/ML
INJECTION, SOLUTION INTRAMUSCULAR; INTRAVENOUS; SUBCUTANEOUS
Status: COMPLETED
Start: 2024-03-20 | End: 2024-03-20

## 2024-03-20 RX ORDER — DEXAMETHASONE SODIUM PHOSPHATE 10 MG/ML
INJECTION, SOLUTION INTRAMUSCULAR; INTRAVENOUS AS NEEDED
Status: DISCONTINUED | OUTPATIENT
Start: 2024-03-20 | End: 2024-03-20 | Stop reason: SURG

## 2024-03-20 RX ORDER — MIDAZOLAM HYDROCHLORIDE 1 MG/ML
INJECTION INTRAMUSCULAR; INTRAVENOUS AS NEEDED
Status: DISCONTINUED | OUTPATIENT
Start: 2024-03-20 | End: 2024-03-20 | Stop reason: SURG

## 2024-03-20 RX ORDER — SODIUM CHLORIDE, SODIUM LACTATE, POTASSIUM CHLORIDE, CALCIUM CHLORIDE 600; 310; 30; 20 MG/100ML; MG/100ML; MG/100ML; MG/100ML
INJECTION, SOLUTION INTRAVENOUS CONTINUOUS
Status: DISCONTINUED | OUTPATIENT
Start: 2024-03-20 | End: 2024-03-20

## 2024-03-20 RX ORDER — ROCURONIUM BROMIDE 10 MG/ML
INJECTION, SOLUTION INTRAVENOUS AS NEEDED
Status: DISCONTINUED | OUTPATIENT
Start: 2024-03-20 | End: 2024-03-20 | Stop reason: SURG

## 2024-03-20 RX ORDER — NEOSTIGMINE METHYLSULFATE 1 MG/ML
INJECTION, SOLUTION INTRAVENOUS AS NEEDED
Status: DISCONTINUED | OUTPATIENT
Start: 2024-03-20 | End: 2024-03-20 | Stop reason: SURG

## 2024-03-20 RX ORDER — DIAZEPAM 5 MG
5 TABLET ORAL AS NEEDED
Status: DISCONTINUED | OUTPATIENT
Start: 2024-03-20 | End: 2024-03-20 | Stop reason: HOSPADM

## 2024-03-20 RX ORDER — ACETAMINOPHEN 500 MG
1000 TABLET ORAL ONCE AS NEEDED
Status: COMPLETED | OUTPATIENT
Start: 2024-03-20 | End: 2024-03-20

## 2024-03-20 RX ORDER — MIDAZOLAM HYDROCHLORIDE 1 MG/ML
INJECTION INTRAMUSCULAR; INTRAVENOUS
Status: COMPLETED
Start: 2024-03-20 | End: 2024-03-20

## 2024-03-20 RX ORDER — HYDROMORPHONE HYDROCHLORIDE 1 MG/ML
0.4 INJECTION, SOLUTION INTRAMUSCULAR; INTRAVENOUS; SUBCUTANEOUS EVERY 5 MIN PRN
Status: DISCONTINUED | OUTPATIENT
Start: 2024-03-20 | End: 2024-03-20

## 2024-03-20 RX ORDER — HYDROCODONE BITARTRATE AND ACETAMINOPHEN 5; 325 MG/1; MG/1
1 TABLET ORAL ONCE AS NEEDED
Status: COMPLETED | OUTPATIENT
Start: 2024-03-20 | End: 2024-03-20

## 2024-03-20 RX ORDER — DEXAMETHASONE SODIUM PHOSPHATE 4 MG/ML
VIAL (ML) INJECTION AS NEEDED
Status: DISCONTINUED | OUTPATIENT
Start: 2024-03-20 | End: 2024-03-20 | Stop reason: SURG

## 2024-03-20 RX ORDER — ISOSULFAN BLUE 50 MG/5ML
INJECTION, SOLUTION SUBCUTANEOUS AS NEEDED
Status: DISCONTINUED | OUTPATIENT
Start: 2024-03-20 | End: 2024-03-20 | Stop reason: HOSPADM

## 2024-03-20 RX ORDER — ONDANSETRON 4 MG/1
4 TABLET, FILM COATED ORAL EVERY 8 HOURS PRN
Qty: 12 TABLET | Refills: 0 | Status: ON HOLD | OUTPATIENT
Start: 2024-03-20 | End: 2024-03-28

## 2024-03-20 RX ORDER — NALOXONE HYDROCHLORIDE 0.4 MG/ML
80 INJECTION, SOLUTION INTRAMUSCULAR; INTRAVENOUS; SUBCUTANEOUS AS NEEDED
Status: DISCONTINUED | OUTPATIENT
Start: 2024-03-20 | End: 2024-03-20

## 2024-03-20 RX ORDER — ACETAMINOPHEN 500 MG
1000 TABLET ORAL ONCE
Status: DISCONTINUED | OUTPATIENT
Start: 2024-03-20 | End: 2024-03-20 | Stop reason: HOSPADM

## 2024-03-20 RX ORDER — HYDROCODONE BITARTRATE AND ACETAMINOPHEN 5; 325 MG/1; MG/1
2 TABLET ORAL ONCE AS NEEDED
Status: COMPLETED | OUTPATIENT
Start: 2024-03-20 | End: 2024-03-20

## 2024-03-20 RX ORDER — LABETALOL HYDROCHLORIDE 5 MG/ML
INJECTION, SOLUTION INTRAVENOUS AS NEEDED
Status: DISCONTINUED | OUTPATIENT
Start: 2024-03-20 | End: 2024-03-20 | Stop reason: SURG

## 2024-03-20 RX ADMIN — GLYCOPYRROLATE 0.6 MG: 0.2 INJECTION, SOLUTION INTRAMUSCULAR; INTRAVENOUS at 15:11:00

## 2024-03-20 RX ADMIN — MIDAZOLAM HYDROCHLORIDE 2 MG: 1 INJECTION INTRAMUSCULAR; INTRAVENOUS at 12:32:00

## 2024-03-20 RX ADMIN — LIDOCAINE HYDROCHLORIDE 50 MG: 10 INJECTION, SOLUTION EPIDURAL; INFILTRATION; INTRACAUDAL; PERINEURAL at 12:38:00

## 2024-03-20 RX ADMIN — CEFAZOLIN SODIUM/WATER 2 G: 2 G/20 ML SYRINGE (ML) INTRAVENOUS at 12:55:00

## 2024-03-20 RX ADMIN — SODIUM CHLORIDE, SODIUM LACTATE, POTASSIUM CHLORIDE, CALCIUM CHLORIDE: 600; 310; 30; 20 INJECTION, SOLUTION INTRAVENOUS at 13:54:00

## 2024-03-20 RX ADMIN — NEOSTIGMINE METHYLSULFATE 3 MG: 1 INJECTION, SOLUTION INTRAVENOUS at 15:11:00

## 2024-03-20 RX ADMIN — ONDANSETRON 4 MG: 2 INJECTION INTRAMUSCULAR; INTRAVENOUS at 15:14:00

## 2024-03-20 RX ADMIN — DEXAMETHASONE SODIUM PHOSPHATE 2 MG: 10 INJECTION, SOLUTION INTRAMUSCULAR; INTRAVENOUS at 12:52:00

## 2024-03-20 RX ADMIN — ROCURONIUM BROMIDE 50 MG: 10 INJECTION, SOLUTION INTRAVENOUS at 12:39:00

## 2024-03-20 RX ADMIN — DEXAMETHASONE SODIUM PHOSPHATE 8 MG: 4 MG/ML VIAL (ML) INJECTION at 13:38:00

## 2024-03-20 RX ADMIN — LABETALOL HYDROCHLORIDE 10 MG: 5 INJECTION, SOLUTION INTRAVENOUS at 14:28:00

## 2024-03-20 RX ADMIN — SODIUM CHLORIDE, SODIUM LACTATE, POTASSIUM CHLORIDE, CALCIUM CHLORIDE: 600; 310; 30; 20 INJECTION, SOLUTION INTRAVENOUS at 15:37:00

## 2024-03-20 RX ADMIN — SODIUM CHLORIDE, SODIUM LACTATE, POTASSIUM CHLORIDE, CALCIUM CHLORIDE: 600; 310; 30; 20 INJECTION, SOLUTION INTRAVENOUS at 12:32:00

## 2024-03-20 NOTE — ANESTHESIA PREPROCEDURE EVALUATION
PRE-OP EVALUATION    Patient Name: Harleen Euceda    Admit Diagnosis: Malignant neoplasm of left female breast, unspecified estrogen receptor status, unspecified site of breast (HCC) [C50.912]    Pre-op Diagnosis: Malignant neoplasm of left female breast, unspecified estrogen receptor status, unspecified site of breast (HCC) [C50.912]    BILATERAL BREAST MASTECTOMY, LEFT BREAST SENTINEL LYMPH NODE BIOPSY, POSSIBLE TOTAL AXILLARY LYMPH NODE DISSECTION, INJECTION OF BLUE DYE (SHEKHAR) Bilateral breast skin envelope reduction (MAYNOR)    Anesthesia Procedure: BILATERAL BREAST MASTECTOMY, LEFT BREAST SENTINEL LYMPH NODE BIOPSY, POSSIBLE TOTAL AXILLARY LYMPH NODE DISSECTION, INJECTION OF BLUE DYE (SHEKHAR) Bilateral breast skin envelope reduction (MAYNOR) (Bilateral)  .    Surgeon(s) and Role:  Panel 1:     * Joseph Proctor DO - Primary  Panel 2:     * Brandon Alonzo MD - Primary    Pre-op vitals reviewed.        Body mass index is 27.63 kg/m².    Current medications reviewed.  Hospital Medications:  • [START ON 3/20/2024] ceFAZolin (Ancef) 1 g, gentamicin (Garamycin) 80 mg in sodium chloride 0.9% 1,000 mL irrigation   Irrigation Once (Intra-Op)       Outpatient Medications:     No medications prior to admission.       Allergies: Other      Anesthesia Evaluation    Patient summary reviewed.    Anesthetic Complications  (-) history of anesthetic complications         GI/Hepatic/Renal    Negative GI/hepatic/renal ROS.                             Cardiovascular    Negative cardiovascular ROS.    Exercise tolerance: good     MET: >4                                           Endo/Other    Negative endo/other ROS.                       (+) arthritis       Pulmonary    Negative pulmonary ROS.                       Neuro/Psych    Negative neuro/psych ROS.                          As per Epic:  Patient Active Problem List:     Fibroadenoma of left breast     Varicose vein     Arthritis     Malignant neoplasm of upper-outer quadrant  of left breast in female, estrogen receptor positive (HCC)           Past Surgical History:   Procedure Laterality Date   •   ,,   • EXTRACTION ERUPTED TOOTH/EXR      wisdom teeth   • INSERT INTRAUTERINE DEVICE      Mirena insertion   • OTHER SURGICAL HISTORY Bilateral 2018    RLTCS with bilateral Salpingectomies   • REMOVE INTRAUTERINE DEVICE      Mirena removal   • SKIN SURGERY       Social History     Socioeconomic History   • Marital status:    Tobacco Use   • Smoking status: Never   • Smokeless tobacco: Never   Vaping Use   • Vaping Use: Never used   Substance and Sexual Activity   • Alcohol use: Yes     Alcohol/week: 6.0 standard drinks of alcohol     Types: 6 Standard drinks or equivalent per week   • Drug use: No   Other Topics Concern   • Caffeine Concern Yes     Comment: 3 cups of coffee daily   • Exercise No     History   Drug Use No     Available pre-op labs reviewed.  Lab Results   Component Value Date    WBC 7.5 2024    RBC 4.99 2024    HGB 15.0 2024    HCT 44.9 2024    MCV 90.0 2024    MCH 30.1 2024    MCHC 33.4 2024    RDW 14.3 2024    .0 2024     Lab Results   Component Value Date     2024    K 4.2 2024     2024    CO2 28.0 2024    BUN 15 2024    CREATSERUM 1.28 (H) 2024    GLU 85 2024    CA 9.1 2024            Airway      Mallampati: III  Mouth opening: >3 FB  TM distance: > 6 cm  Neck ROM: full Cardiovascular      Rhythm: regular  Rate: normal  (-) murmur   Dental  Comment: Dentition is grossly intact;  Patient does not demonstrate loose teeth to inspection.           Pulmonary  Comment: Unlabored ventilatory effort, no retractions.  Pulmonary exam normal.  Breath sounds clear to auscultation bilaterally.               Other findings        ASA: 2   Plan: general  NPO status verified and patient meets guidelines.    Post-procedure pain  management plan discussed with surgeon and patient.  Surgeon requests: regional block  Comment: I explained intrinsic risks of general anesthesia, including nausea, dental damage, sore throat, mouth injury,and hoarseness from airway management.  All questions were answered and understanding was demonstrated of risks.  Informed permission was obtained to proceed as documented in the signed consent form.     The risks and benefits of regional anesthesia have been explained to the patient as indicated on the anesthesia consent form, which has been signed.  Understanding has been demonstrated of intrinsic risks including failed block, neuropraxia, hematoma, anesthetic toxicity and other risks as described. All questions were answered.   A desire to proceed with regional anesthesia for post operative analgesia has been demonstrated with an understanding of risks of adverse outcomes.    Plan serratus anterior block    Plan/risks discussed with: patient and mother            Present on Admission:  **None**

## 2024-03-20 NOTE — OPERATIVE REPORT
DATE OF SURGERY: 03/20/24   PREOPERATIVE DIAGNOSIS:  Left breast cancer with acquired absence of bilateral breasts.  POSTOPERATIVE DIAGNOSIS:  Left breast cancer with acquired absence of bilateral breasts.  PROCEDURE:    Wise pattern skin rearrangement, bilateral breasts, with the rearranged surface area of 160 sq cm.   Complex closure of left axillary wound totaling 6 cm     SURGEON: Brandon Alonzo MD    ASSISTANT:  COLBY Hernandez     ANESTHESIA:  General.     ESTIMATED BLOOD LOSS:  10mL.     COMPLICATIONS: None      INDICATIONS:  The patient is a 48 year old female with a history of  left breast cancer. The patient was seen by Dr. Proctor elected to undergo bilateral mastectomy.  She was referred preoperatively to discuss reconstructive options and opted for skin envelope reduction via a Wise pattern approach.          OPERATIVE TECHNIQUE:  Informed consent was obtained from the patient.  The risks, benefits, and alternatives reviewed with the patient preoperatively.  She expressed understanding and wished to proceed.  The patient was marked in the preoperative holding area in the upright position.  The midline, inframammary folds, and  breast meridians were marked. The nipple-areolar complexes were encompassed in vertically oriented ellipses.  The patient was then taken to the operating room by Dr. Collier's's team where she underwent bilateral skin-sparing mastectomy.  Once Dr. Proctor's's portion of the procedure was completed, I entered the room and the plastic surgery portion of the procedure began.  The mastectomy skin flaps were inspected and appeared to be a suitable thickness of viability to facilitate immediate reconstruction.  Left axillary wound was noted.  This was repaired in a layered fashion with 3-0 Vicryl deep sutures and 4 Monocryl subcuticular suture.  The length of closure totaled 6 cm.  The pockets were rinsed with Betadine and then antibiotic irrigation until clear.  Hemostasis was achieved with  electrocautery.  The patient was then placed in the upright position and a Wise pattern skin incision was tailor tacked and marked.  The lower pole skin within the Wise pattern marking was deepithelialized bilaterally, maintaining the skin and subcutaneous tissues on an inferior pedicle.  Next, the horizontal limbs of the Wise pattern were incised with electrocautery.  This was performed identically on both sides.  Next the lower pole dermal flap was secured to the pectoralis muscle with 3-0 Vicryl sutures.  The flaps were then transposed and stapled in place.  All the excised tissues were sent for permanent pathologic analysis.  The area of tissue rearrangement measured  160 sq cm.  215 Pakistani Reese drains on the left and 1 on the right were exited through a lateral stab incisions and secured to skin with 3 nylon suture.  The wounds repaired in a layered fashion with 3-0 Vicryl deep sutures and 4-0 Monocryl subcuticular suture.  Dermabond and Steri-Strips were placed on the incisions.  Biopatch and Tegaderm were placed at the drain sites.  Fluff gauze and a surgical bra were applied.  The patient was awakened, extubated, taken to the recovery area in stable condition.  There were no operative complications.  All needle, sponge, and instrument counts were correct at the end of the procedure.

## 2024-03-20 NOTE — ANESTHESIA POSTPROCEDURE EVALUATION
[facility]    Harleen Euceda Patient Status:  Hospital Outpatient Surgery   Age/Gender 48 year old female MRN [id number]   Location [facility] POST ANESTHESIA CARE UNIT Attending Joseph Proctor DO   Hosp Day # 0 PCP Ezra Jones MD       Anesthesia Post-op Note    BILATERAL BREAST MASTECTOMY, LEFT BREAST SENTINEL LYMPH NODE BIOPSY, INJECTION OF BLUE DYE (SHEKHAR)    Procedure Summary       Date: 03/20/24 Room / Location:  MAIN OR 19 / [facility] MAIN OR    Anesthesia Start: 1232 Anesthesia Stop: 1537    Procedures:       BILATERAL BREAST MASTECTOMY, LEFT BREAST SENTINEL LYMPH NODE BIOPSY, INJECTION OF BLUE DYE (SHEKHAR) (Bilateral: Breast)      .Bilateral breast skin envelope reduction (Bilateral: Breast) Diagnosis:       Malignant neoplasm of left female breast, unspecified estrogen receptor status, unspecified site of breast (HCC)      (Malignant neoplasm of left female breast, unspecified estrogen receptor status, unspecified site of breast (HCC) [C50.912])    Surgeons: Joseph Proctor DO; Brandon Alonzo MD Anesthesiologist: Da Peng MD    Anesthesia Type: general ASA Status: 2            Anesthesia Type: general    Vitals Value Taken Time   /108 03/20/24 1537   Temp 98 °F (36.7 °C) 03/20/24 1537   Pulse 92 03/20/24 1538   Resp 9 03/20/24 1538   SpO2 100 % 03/20/24 1538   Vitals shown include unfiled device data.    Patient Location: PACU    Anesthesia Type: general    Airway Patency: patent    Postop Pain Control: Other: (Tearful, unclear pain or anxiety.)    Mental Status: mildly sedated but able to meaningfully participate in the post-anesthesia evaluation    Nausea/Vomiting: none    Cardiopulmonary/Hydration status: stable euvolemic    Complications: no apparent anesthesia related complications    Postop vital signs: stable    Comments: The endotracheal airway was removed in the procedure area.  Cable monitors were removed, and the patient was transported with observation to the recovery area  personally with the OR team.  The patient was responsive in a meaningful way and demonstrated a good airway.  PACU monitors were then applied with device connection to Epic.  Full report signout, including report, identifications, history, procedure, anesthesia course, recovery expectations with chance for questions was provided to a responsible recovery RN.        Dental Exam: Unchanged from Preop    Patient to be discharged from PACU when criteria met.

## 2024-03-20 NOTE — ANESTHESIA PROCEDURE NOTES
Regional Block    Date/Time: 3/20/2024 12:48 PM    Performed by: Da Peng MD  Authorized by: Da Peng MD      General Information and Staff    Start Time:  3/20/2024 12:48 PM  End Time:  3/20/2024 12:53 PM  Anesthesiologist:  Da Peng MD  Performed by:  Anesthesiologist  Patient Location:  OR    Block Placement: Post Induction  Site Identification: real time ultrasound guided and image stored and retrievable    Block site/laterality marked before start: site marked  Reason for Block: at surgeon's request and post-op pain management    Preanesthetic Checklist: 2 patient identifers, IV checked, risks and benefits discussed, monitors and equipment checked, pre-op evaluation, timeout performed, anesthesia consent, sterile technique used, no prohibitive neurological deficits and no local skin infection at insertion site      Procedure Details    Patient Position:  Supine  Prep: ChloraPrep    Monitoring:  Cardiac monitor, continuous pulse ox, blood pressure cuff and heart rate  Anesthesia block type: Serratus anterior block, bilateral approach.  Laterality:  Bilateral  Injection Technique:  Single-shot    Needle    Needle Type:  Short-bevel and echogenic  Needle Length:  100 mm  Needle Localization:  Ultrasound guidance and anatomical landmarks  Reason for Ultrasound Use: appropriate spread of the medication was noted in real time and no ultrasound evidence of intravascular and/or intraneural injection            Assessment    Injection Assessment:  Good spread noted, negative resistance, negative aspiration for heme, incremental injection, low pressure and local visualized surrounding nerve on ultrasound  Heart Rate Change: No    - Patient tolerated block procedure well without evidence of immediate block related complications.     Medications  3/20/2024 12:48 PM      Additional Comments    After induction general anesthesia, nerve blocks placed.  Uneventful placement.  Bupivacaine 0.25% sixty ccs with  dexamethasone 2mg PF.

## 2024-03-20 NOTE — BRIEF OP NOTE
Pre-Operative Diagnosis: Malignant neoplasm of left female breast, unspecified estrogen receptor status, unspecified site of breast (HCC) [C50.912]     Post-Operative Diagnosis: Malignant neoplasm of left female breast, unspecified estrogen receptor status, unspecified site of breast (HCC) [C50.912]      Procedure Performed:   BILATERAL BREAST MASTECTOMY, LEFT BREAST SENTINEL LYMPH NODE BIOPSY, POSSIBLE TOTAL AXILLARY LYMPH NODE DISSECTION, INJECTION OF BLUE DYE (SHEKHAR) Bilateral breast skin envelope reduction (MAYNOR)    Surgeon(s) and Role:  Panel 1:     * Joseph Proctor DO - Primary  Panel 2:     * Brandon Alonzo MD - Primary    Assistant(s):  PA: María Engel PA-C  APN: Yancy Mensah APRN     Surgical Findings:      Specimen:      Estimated Blood Loss: No data recorded    Dictation Number:      Joseph Proctor DO  3/20/2024  2:44 PM

## 2024-03-20 NOTE — PROGRESS NOTES
Plan for bilateral SS-mastectomy by Hitesh and skin envelope reduction via a [place] approach reviewed with patient. We reviewed the risks of the surgery including but not limited to bleeding, infection, scarring, seroma, delayed wound healing, injury to adjacent structures, mastectomy skin flap necrosis, and need for further surgery. We discussed expected postoperative course including need for drains, activity limitation, and compression. Multiple questions were answered to patient satisfaction. No guarantees as to outcome were offered. The patient expresses understanding and wishes to proceed

## 2024-03-20 NOTE — H&P
Patient presents with new diagnosis of invasive lobular carcinoma of the left breast.  Patient initially underwent core needle biopsy of the left breast for a imaging finding of a 5 mm lesion.  This demonstrated invasive lobular carcinoma.  Genetic testing was negative although the patient has a strong family history of breast cancer in her mother and second-degree relatives.  Her mother was diagnosed at age 47.  Patient has noticed a slow steady fullness and increasing size of her left breast upper outer quadrant for the past 6 months.  Subsequent MRI demonstrated evidence of a 11 cm enhancements consistent with carcinoma.  Patient has seen Dr. May she is currently scheduled for bilateral mastectomy with reconstruction.      .                    Past Medical History:   Diagnosis Date    Anxiety state      Back problem      Breast cancer (HCC)      Esophageal obstruction due to food impaction 2024    Fibroadenoma of left breast 2015    Hx of motion sickness      PONV (postoperative nausea and vomiting)       with last  over sedated and o2 sat dropped    Varicose vein 2015    Visual impairment              Past Surgical History:   Procedure Laterality Date       ,,    EXTRACTION ERUPTED TOOTH/EXR         wisdom teeth    INSERT INTRAUTERINE DEVICE        Mirena insertion    OTHER SURGICAL HISTORY Bilateral 2018     RLTCS with bilateral Salpingectomies    REMOVE INTRAUTERINE DEVICE        Mirena removal    SKIN SURGERY                 Current Outpatient Medications on File Prior to Visit   Medication Sig Dispense Refill    ALPRAZolam 0.25 MG Oral Tab Take 1 tablet (0.25 mg total) by mouth 2 (two) times daily as needed. (Patient not taking: Reported on 2024) 30 tablet 1    sucralfate 1 GM/10ML Oral Suspension Take 10 mL (1 g total) by mouth 4 (four) times daily before meals and nightly (2200). 1200 mL 0    pantoprazole 40 MG Oral Tab EC Take 1 tablet  (40 mg total) by mouth 2 (two) times daily before meals. 60 tablet 0      No current facility-administered medications on file prior to visit.      @ALL@        Family History   Problem Relation Age of Onset    Hypertension Father      Diabetes Father      Heart Disorder Father 52         quad bypass    Other (hypothyroid) Father      Breast Cancer Mother 46    Hypertension Mother      Psychiatric Mother           depression    Breast Cancer Maternal Grandmother 63    Other (Cholangiocarinoma) Brother 36      Social History            Socioeconomic History    Marital status:    Tobacco Use    Smoking status: Never    Smokeless tobacco: Never   Vaping Use    Vaping Use: Never used   Substance and Sexual Activity    Alcohol use: Yes       Alcohol/week: 6.0 standard drinks of alcohol       Types: 6 Standard drinks or equivalent per week    Drug use: No   Other Topics Concern    Caffeine Concern Yes       Comment: 3 cups of coffee daily    Exercise No      Social Determinants of Health           Food Insecurity: No Food Insecurity (2/13/2024)     Food Insecurity      Food Insecurity: Never true   Transportation Needs: No Transportation Needs (2/13/2024)     Transportation Needs      Lack of Transportation: No   Housing Stability: Low Risk  (2/13/2024)     Housing Stability      Housing Instability: No         ROS      GENERAL HEALTH: otherwise feels well, no weight loss, no fever or chills  SKIN: denies any unusual skin rashes or jaundice  HEENT: denies nasal congestion, sinus pain or sore throat; hearing loss negative,   EYES , no diplopia or vision changes  RESPIRATORY: denies shortness of breath, wheezing or cough   CARDIOVASCULAR: denies chest pain or FREEMAN; no palpitations   GI: denies nausea, vomiting, constipation, diarrhea; no rectal bleeding; no heartburn  GENITAL/: no dysuria, urgency or frequency, no tea colored urine  MUSCULOSKELETAL: no joint complaints upper or lower extremities  HEMATOLOGY: denies  hx anemia; denies bruising or excessive bleeding  Endocrine: no weight gain or loss no hot or cold intolerance     EXAM      Pulse 69, temperature 97.1 °F (36.2 °C), last menstrual period 02/04/2024, not currently breastfeeding.  GENERAL: well developed, well nourished female, in no apparent distress.    MENTAL STATUS :Alert, oriented x 3  PSYCH: normal mood and affect  SKIN: anicteric, no rashes, no bruising  EYES: PERRLA, EOMI, sclera anicteric,  conjunctiva without pallor  HEENT: normocephalic, atraumatic, TMs clear, nares patent, mouth moist, pharynx w/o erythema  NECK: supple, no JVD, no adenopathy, no thyromegaly  RESPIRATORY: clear to auscultation, no rales , rhonchi or wheezing  CARDIOVASCULAR: RRR, murmur negative  ABDOMEN: normal active BS, soft, nondistended  no HSM, no masses or hernias  LYMPHATIC: no axillary , supraclavicular or inguinal lymphadenopathy  EXTREMITIES: no cyanosis, clubbing or edema, no atrophy, full ROM  Breast examination performed with [facility] demonstrates fullness of the left upper outer quadrant of the breast right breast examination is normal supraclavicular and axillary lymph examination normal  STUDIES:              Admission on 02/12/2024, Discharged on 02/13/2024   Component Date Value Ref Range Status    Glucose 02/12/2024 100 (H)  70 - 99 mg/dL Final    Sodium 02/12/2024 143  136 - 145 mmol/L Final    Potassium 02/12/2024 3.7  3.5 - 5.1 mmol/L Final    Chloride 02/12/2024 111  98 - 112 mmol/L Final    CO2 02/12/2024 25.0  21.0 - 32.0 mmol/L Final    Anion Gap 02/12/2024 7  0 - 18 mmol/L Final    BUN 02/12/2024 12  9 - 23 mg/dL Final    Creatinine 02/12/2024 1.05 (H)  0.55 - 1.02 mg/dL Final    Calcium, Total 02/12/2024 9.5  8.5 - 10.1 mg/dL Final    Calculated Osmolality 02/12/2024 296 (H)  275 - 295 mOsm/kg Final    eGFR-Cr 02/12/2024 66  >=60 mL/min/1.73m2 Final    AST 02/12/2024 14 (L)  15 - 37 U/L Final    ALT 02/12/2024 27  13 - 56 U/L Final    Alkaline Phosphatase  02/12/2024 80  39 - 100 U/L Final    Bilirubin, Total 02/12/2024 0.6  0.1 - 2.0 mg/dL Final    Total Protein 02/12/2024 8.1  6.4 - 8.2 g/dL Final    Albumin 02/12/2024 4.2  3.4 - 5.0 g/dL Final    Globulin  02/12/2024 3.9  2.8 - 4.4 g/dL Final    A/G Ratio 02/12/2024 1.1  1.0 - 2.0 Final    Hold Blue 02/12/2024 Auto Resulted    Final    Hold Gold 02/12/2024 Auto Resulted    Final    WBC 02/12/2024 11.1 (H)  4.0 - 11.0 x10(3) uL Final    RBC 02/12/2024 5.23  3.80 - 5.30 x10(6)uL Final    HGB 02/12/2024 15.5  12.0 - 16.0 g/dL Final    HCT 02/12/2024 46.3  35.0 - 48.0 % Final    PLT 02/12/2024 350.0  150.0 - 450.0 10(3)uL Final    MCV 02/12/2024 88.5  80.0 - 100.0 fL Final    MCH 02/12/2024 29.6  26.0 - 34.0 pg Final    MCHC 02/12/2024 33.5  31.0 - 37.0 g/dL Final    RDW 02/12/2024 14.0  % Final    Neutrophil Absolute Prelim 02/12/2024 8.34 (H)  1.50 - 7.70 x10 (3) uL Final    Neutrophil Absolute 02/12/2024 8.34 (H)  1.50 - 7.70 x10(3) uL Final    Lymphocyte Absolute 02/12/2024 1.65  1.00 - 4.00 x10(3) uL Final    Monocyte Absolute 02/12/2024 0.85  0.10 - 1.00 x10(3) uL Final    Eosinophil Absolute 02/12/2024 0.17  0.00 - 0.70 x10(3) uL Final    Basophil Absolute 02/12/2024 0.07  0.00 - 0.20 x10(3) uL Final    Immature Granulocyte Absolute 02/12/2024 0.04  0.00 - 1.00 x10(3) uL Final    Neutrophil % 02/12/2024 75.1  % Final    Lymphocyte % 02/12/2024 14.8  % Final    Monocyte % 02/12/2024 7.6  % Final    Eosinophil % 02/12/2024 1.5  % Final    Basophil % 02/12/2024 0.6  % Final    Immature Granulocyte % 02/12/2024 0.4  % Final    Rapid SARS-CoV-2 by PCR 02/13/2024 Not Detected  Not Detected Final     This test is intended for the qualitative detection of nucleic acid from the SARS-CoV-2 viral RNA from individuals who are suspected of COVID-19 infection by their healthcare provider.     A \"Detected\" result is considered a positive test result for COVID-19.   A \"Not Detected\" result for this test means that SARS-CoV-2  RNA was not present in the sample above the limit of detection of the assay.     Test performed using the Abbott ID NOW COVID-19 assay performed on the ID NOW Instrument, [employer] [place], [place] [address].     This test is being used under the Food and Drug Administration's Emergency Use Authorization.     The authorized Fact Sheet for Healthcare Providers for this assay is available upon request from the laboratory.     [facility]       [id number] [place] [address]              Phone: [phone] Fax: [phone]  CLIA # [id number]      HCG URINE QUALITATIVE 02/13/2024 Negative  Negative Final         ASSESSMENT   Imp: MRI history and physical examination consistent with a 11 cm tumor of the left upper outer quadrant.  I do agree with the recommendation for mastectomy, patient is opting for bilateral with left sentinel lymph node biopsy possible axillary lymph node dissection lengthy discussion with the patient regarding the risks of the procedure to include bleeding infection Flap necrosis seroma formation and limb edema.  She is also aware of the possibility that final pathology does not demonstrate 11 cm tumor however the MRI is highly suspicious for tumor the size.  Patient is opting for bilateral mastectomy also based on the fact of her strong family history of breast cancer although her genetic testing is negative.  PLan:

## 2024-03-21 ENCOUNTER — TELEPHONE (OUTPATIENT)
Dept: HEMATOLOGY/ONCOLOGY | Facility: HOSPITAL | Age: 49
End: 2024-03-21

## 2024-03-21 NOTE — TELEPHONE ENCOUNTER
Called patient, post op day #1. States that overall doing well, pain is well controlled with Norco, she is also taking Colace. She has briefly looked at incisions, noted some oozing from one steri strip but reports no ac blood. Managing drains well and documenting output on drain log.  Is aware of her surgical follow up appointment. Scheduled with medical oncology. Phone number provided and encouraged patient to call back with any questions or concerns.

## 2024-03-21 NOTE — OPERATIVE REPORT
[facility]    PATIENT'S NAME: CECY REYES   ATTENDING PHYSICIAN: Joseph Proctor D.O.   OPERATING PHYSICIAN: Joseph Proctor D.O.   PATIENT ACCOUNT#:   [id number]    LOCATION:  [facility] 10  MEDICAL RECORD #:   [id number]       YOB: 1975  ADMISSION DATE:       03/20/2024      OPERATION DATE:  03/20/2024    OPERATIVE REPORT    PREOPERATIVE DIAGNOSIS:  Infiltrating lobular carcinoma of the left breast.  POSTOPERATIVE DIAGNOSIS:  Infiltrating lobular carcinoma of the left breast with negative sentinel lymph nodes on frozen section.  PROCEDURE:  1.   Injection of isosulfan blue into the 4-quadrant periareolar left breast.  2.   Bilateral mastectomy.  3.   Left axillary sentinel lymph node x3.    ASSISTANT SURGEON:  Brandon Alonzo MD.    ASSISTANT:  María Engel PA-C    ANESTHESIA:  General.    COMPLICATIONS:  None.    BLOOD LOSS:  40 mL.    OPERATIVE TECHNIQUE:  An informed consent was previously obtained.  The patient was taken to the operative suite and placed in the supine position.  General inhalational anesthetic was administered by anesthesia department, and the bilateral breasts were prepped and draped in usual sterile fashion.  Lines of potential incision were marked previously by the plastic surgery department, Dr. Alonzo, on the bilateral breasts.      Attention was now directed towards the left axilla.  Left gamma probe scanning of the left axilla demonstrated a site of maximal intensity of radionuclide.  A transverse skin incision was made 6 cm in length directly over this site.  Hemostasis secured in the wound, and Weitlaner retractor was placed within the wound.  Dissection was carried out with handheld electrocautery down into the axilla through the clavipectoral fascia.  Immediately, on the surface of the axillary contents was a blue-staining normal-sized lymph node.  This had in vivo count of approximately 40,000.  This was carefully dissected from the  surrounding axillary structures.  Lymphatic channels where identified were hemoclipped and divided.  This was sent as sentinel lymph node #1.  Further gamma probe scanning of the axilla demonstrated an additional 2 lymph nodes, which had in vivo activity of greater than 400 on active count.  These were also individually grasped, dissected from the axillary structures, clipped, and divided from the axilla.    At the completion of the removal of the 2 additional blue-staining lymph nodes, gamma probe scanning of the axilla for a 10-second count demonstrated a 10-second count of 126.  Copious irrigation was carried out within the axilla.  All fluid was suctioned clear.  A Ray-Alek was placed in the axilla.      Attention was now directed towards the left breast where lines of skin incision were made as per plastic surgery guidelines.  Then, 1 cm flaps were raised in all directions to the level of the clavicle, medial to the sternum, inferior to the insertion of the rectus musculature, and laterally to include the tail of Pollack.  The breast was then dissected off the pectoralis major muscle medial to lateral by including the pectoralis fascia and excising the entire breast tissue.  The breast was then oriented with various colored sutures for pathological evaluation.    Copious irrigation was carried out within the dissection area.  Identical procedure was carried out on the right-hand side with regard to the right mastectomy.  There was no axillary involvement or axillary dissection performed on the right-hand side.  Subsequent frozen section evaluation from Pathology demonstrated 3 negative lymph nodes on frozen section.  At this point, I terminated the general surgical oncologic portion of the procedure.  Dr. Alonzo remained in the operative suite for subsequent reconstruction.  Please refer to Dr. Alonzo's dictation.    Dictated By Joseph Proctor D.O.  d: 03/20/2024 14:50:24  t: 03/20/2024  22:15:45  [facility] [id number]  /    cc: Joseph Proctor D.O.

## 2024-03-25 ENCOUNTER — TELEPHONE (OUTPATIENT)
Dept: SURGERY | Facility: CLINIC | Age: 49
End: 2024-03-25

## 2024-03-27 ENCOUNTER — TELEPHONE (OUTPATIENT)
Facility: LOCATION | Age: 49
End: 2024-03-27

## 2024-03-27 ENCOUNTER — OFFICE VISIT (OUTPATIENT)
Dept: SURGERY | Facility: CLINIC | Age: 49
End: 2024-03-27
Payer: COMMERCIAL

## 2024-03-27 DIAGNOSIS — Z90.13 ABSENCE OF BREAST, BILATERAL: ICD-10-CM

## 2024-03-27 DIAGNOSIS — N64.89 HEMATOMA OF LEFT BREAST: Primary | ICD-10-CM

## 2024-03-27 PROCEDURE — 99024 POSTOP FOLLOW-UP VISIT: CPT

## 2024-03-27 NOTE — TELEPHONE ENCOUNTER
Good afternoon patient called stating she is returning a call for . He stated for us to reach out to him once because he might be in surgery this afternoon.     Call back # [phone]

## 2024-03-27 NOTE — PROGRESS NOTES
Harleen Euceda is a 48 year old female who presents today for a follow-up after Injection of isosulfan blue into the 4-quadrant periareolar left breast, bilateral mastectomy, left axillary sentinel lymph node x3 (Dr. Proctor) and Flanagan pattern skin rearrangement, bilateral breasts, with the rearranged surface area of 160 sq cm., Complex closure of left axillary wound totaling 6 cm with Dr. Alonzo on 3/20/2024.    She denies fever and chills. She denies nausea, vomiting, diarrhea.   Her pain is controlled.      Physical Exam     Breasts: Right breast incision is clean, dry, intact.  There is no evidence of hematoma or seroma.  Right mastectomy skin is without erythema.  There is superficial delayed wound healing at the T-junction.  No open wound or wound drainage.  Right drain site is clean, dry, intact with serosanguineous fluid present.    Left breast mastectomy skin with ecchymosis on the entirety of the mastectomy skin consistent with previous hematoma.  Incisions are clean, dry, intact.  The T-junction has superficial breakdown consistent with stress from hematoma.  No obvious signs of active hematoma including active bleeding or ac red blood.  Drain sites x 2 are clean, dry, intact.  Dark colored sanguinous fluid present consistent with old hematoma.  Left breast appears larger in size than right breast.      There were no vitals filed for this visit.      Assessment and Plan     Harleen Euceda is s/p Injection of isosulfan blue into the 4-quadrant periareolar left breast, bilateral mastectomy, left axillary sentinel lymph node x3 (Dr. Proctor) and Flanagan pattern skin rearrangement, bilateral breasts, with the rearranged surface area of 160 sq cm., Complex closure of left axillary wound totaling 6 cm with Dr. Alonzo on 3/20/2024.    L breast with non-acute hematoma.  Aspiration of the left breast hematoma was attempted.  The left breast was cleansed with Betadine solution then a sterile butterfly needle was  introduced, less than 1 mL of hematoma fluid was aspirated.  The patient tolerated this well.    Offered the patient evacuation of his left breast hematoma in the operating room tomorrow (3-28).  Patient was agreeable to plan.  The operating room was notified and the surgery was scheduled.    We dicussed advantages and disadvantages of surgical vs non-surgical management of her L breast hematoma. Given the size of her hematoma I recommend surgical evacuation to accelerate the healing process.     All drains were left in place today.  All drains were redressed with a new Biopatch and Tegaderm.  The bilateral breast incisions were redressed with Neosporin, Xeroform, Telfa, ABD pads.  The surgical bra and Ace bandages x 2 were applied for additional compression.    The patient was educated on signs of active hematoma and instructed to contact the office if any of these should occur.  Patient is scheduled for left breast hematoma evacuation tomorrow.  Her and her mother were encouraged to contact the office with any questions or concerns.    Questions were answered. Patient understands.

## 2024-03-27 NOTE — PATIENT INSTRUCTIONS
Surgeon:         Dr. Lorri Mendenhall                                  Tel:         [phone]                                  Fax:        [phone]    Surgery/Procedure: Left breast hematoma evacuation, general anesthesia, 1 hour, outpatient     Dx Code: N64.89    [facility]ospital[facility]  [facility] [address] [place] [id number]           [phone]  3/27/2024    1. Someone will need to drive you to and from the hospital if your procedure is outpatient.    2.Do not drink alcohol or smoke 24 hours prior to your procedure.    3. Bring a picture ID and your insurance card.    4. You will be contacted by the hospital the day before to confirm the procedure time and location.     Photos taken on 3/27/2024

## 2024-03-28 ENCOUNTER — HOSPITAL ENCOUNTER (OUTPATIENT)
Facility: HOSPITAL | Age: 49
Setting detail: HOSPITAL OUTPATIENT SURGERY
Discharge: HOME | End: 2024-03-28
Attending: SURGERY | Admitting: SURGERY
Payer: COMMERCIAL

## 2024-03-28 ENCOUNTER — ANESTHESIA (OUTPATIENT)
Dept: SURGERY | Facility: HOSPITAL | Age: 49
End: 2024-03-28
Payer: COMMERCIAL

## 2024-03-28 ENCOUNTER — ANESTHESIA EVENT (OUTPATIENT)
Dept: SURGERY | Facility: HOSPITAL | Age: 49
End: 2024-03-28
Payer: COMMERCIAL

## 2024-03-28 VITALS
SYSTOLIC BLOOD PRESSURE: 127 MMHG | HEIGHT: 63 IN | WEIGHT: 159 LBS | BODY MASS INDEX: 28.17 KG/M2 | HEART RATE: 81 BPM | RESPIRATION RATE: 18 BRPM | DIASTOLIC BLOOD PRESSURE: 71 MMHG | OXYGEN SATURATION: 99 % | TEMPERATURE: 98 F

## 2024-03-28 DIAGNOSIS — C50.412 MALIGNANT NEOPLASM OF UPPER-OUTER QUADRANT OF LEFT BREAST IN FEMALE, ESTROGEN RECEPTOR POSITIVE (HCC): ICD-10-CM

## 2024-03-28 DIAGNOSIS — N64.89 HEMATOMA OF LEFT BREAST: ICD-10-CM

## 2024-03-28 DIAGNOSIS — Z17.0 MALIGNANT NEOPLASM OF UPPER-OUTER QUADRANT OF LEFT BREAST IN FEMALE, ESTROGEN RECEPTOR POSITIVE (HCC): ICD-10-CM

## 2024-03-28 LAB — B-HCG UR QL: NEGATIVE

## 2024-03-28 PROCEDURE — 0HB5XZZ EXCISION OF CHEST SKIN, EXTERNAL APPROACH: ICD-10-PCS | Performed by: SURGERY

## 2024-03-28 PROCEDURE — 88305 TISSUE EXAM BY PATHOLOGIST: CPT | Performed by: SURGERY

## 2024-03-28 PROCEDURE — 0HCU0ZZ EXTIRPATION OF MATTER FROM LEFT BREAST, OPEN APPROACH: ICD-10-PCS | Performed by: SURGERY

## 2024-03-28 PROCEDURE — 88342 IMHCHEM/IMCYTCHM 1ST ANTB: CPT | Performed by: SURGERY

## 2024-03-28 PROCEDURE — 81025 URINE PREGNANCY TEST: CPT

## 2024-03-28 PROCEDURE — 0HQ5XZZ REPAIR CHEST SKIN, EXTERNAL APPROACH: ICD-10-PCS | Performed by: SURGERY

## 2024-03-28 RX ORDER — DIPHENHYDRAMINE HYDROCHLORIDE 50 MG/ML
12.5 INJECTION INTRAMUSCULAR; INTRAVENOUS AS NEEDED
Status: DISCONTINUED | OUTPATIENT
Start: 2024-03-28 | End: 2024-03-28

## 2024-03-28 RX ORDER — METOCLOPRAMIDE HYDROCHLORIDE 5 MG/ML
INJECTION INTRAMUSCULAR; INTRAVENOUS AS NEEDED
Status: DISCONTINUED | OUTPATIENT
Start: 2024-03-28 | End: 2024-03-28 | Stop reason: SURG

## 2024-03-28 RX ORDER — MORPHINE SULFATE 4 MG/ML
4 INJECTION, SOLUTION INTRAMUSCULAR; INTRAVENOUS EVERY 5 MIN PRN
Status: DISCONTINUED | OUTPATIENT
Start: 2024-03-28 | End: 2024-03-28

## 2024-03-28 RX ORDER — SCOLOPAMINE TRANSDERMAL SYSTEM 1 MG/1
1 PATCH, EXTENDED RELEASE TRANSDERMAL ONCE
Status: DISCONTINUED | OUTPATIENT
Start: 2024-03-28 | End: 2024-03-28 | Stop reason: HOSPADM

## 2024-03-28 RX ORDER — MEPERIDINE HYDROCHLORIDE 25 MG/ML
25 INJECTION INTRAMUSCULAR; INTRAVENOUS; SUBCUTANEOUS AS NEEDED
Status: DISCONTINUED | OUTPATIENT
Start: 2024-03-28 | End: 2024-03-28

## 2024-03-28 RX ORDER — LIDOCAINE HYDROCHLORIDE 10 MG/ML
INJECTION, SOLUTION EPIDURAL; INFILTRATION; INTRACAUDAL; PERINEURAL AS NEEDED
Status: DISCONTINUED | OUTPATIENT
Start: 2024-03-28 | End: 2024-03-28 | Stop reason: SURG

## 2024-03-28 RX ORDER — HYDROCODONE BITARTRATE AND ACETAMINOPHEN 5; 325 MG/1; MG/1
2 TABLET ORAL ONCE AS NEEDED
Status: DISCONTINUED | OUTPATIENT
Start: 2024-03-28 | End: 2024-03-28

## 2024-03-28 RX ORDER — HYDROCODONE BITARTRATE AND ACETAMINOPHEN 5; 325 MG/1; MG/1
1 TABLET ORAL ONCE AS NEEDED
Status: DISCONTINUED | OUTPATIENT
Start: 2024-03-28 | End: 2024-03-28

## 2024-03-28 RX ORDER — NALOXONE HYDROCHLORIDE 0.4 MG/ML
0.08 INJECTION, SOLUTION INTRAMUSCULAR; INTRAVENOUS; SUBCUTANEOUS AS NEEDED
Status: DISCONTINUED | OUTPATIENT
Start: 2024-03-28 | End: 2024-03-28

## 2024-03-28 RX ORDER — BUPIVACAINE HYDROCHLORIDE 5 MG/ML
INJECTION, SOLUTION EPIDURAL; INTRACAUDAL AS NEEDED
Status: DISCONTINUED | OUTPATIENT
Start: 2024-03-28 | End: 2024-03-28 | Stop reason: HOSPADM

## 2024-03-28 RX ORDER — NEOSTIGMINE METHYLSULFATE 1 MG/ML
INJECTION, SOLUTION INTRAVENOUS AS NEEDED
Status: DISCONTINUED | OUTPATIENT
Start: 2024-03-28 | End: 2024-03-28 | Stop reason: SURG

## 2024-03-28 RX ORDER — PHENYLEPHRINE HCL 10 MG/ML
VIAL (ML) INJECTION AS NEEDED
Status: DISCONTINUED | OUTPATIENT
Start: 2024-03-28 | End: 2024-03-28 | Stop reason: SURG

## 2024-03-28 RX ORDER — DEXAMETHASONE SODIUM PHOSPHATE 4 MG/ML
VIAL (ML) INJECTION AS NEEDED
Status: DISCONTINUED | OUTPATIENT
Start: 2024-03-28 | End: 2024-03-28 | Stop reason: SURG

## 2024-03-28 RX ORDER — HYDROCODONE BITARTRATE AND ACETAMINOPHEN 5; 325 MG/1; MG/1
1-2 TABLET ORAL EVERY 6 HOURS PRN
Qty: 30 TABLET | Refills: 0 | Status: SHIPPED | OUTPATIENT
Start: 2024-03-28

## 2024-03-28 RX ORDER — ROCURONIUM BROMIDE 10 MG/ML
INJECTION, SOLUTION INTRAVENOUS AS NEEDED
Status: DISCONTINUED | OUTPATIENT
Start: 2024-03-28 | End: 2024-03-28 | Stop reason: SURG

## 2024-03-28 RX ORDER — MIDAZOLAM HYDROCHLORIDE 1 MG/ML
1 INJECTION INTRAMUSCULAR; INTRAVENOUS EVERY 5 MIN PRN
Status: DISCONTINUED | OUTPATIENT
Start: 2024-03-28 | End: 2024-03-28

## 2024-03-28 RX ORDER — ONDANSETRON 4 MG/1
4 TABLET, FILM COATED ORAL EVERY 8 HOURS PRN
Qty: 12 TABLET | Refills: 0 | Status: SHIPPED | OUTPATIENT
Start: 2024-03-28

## 2024-03-28 RX ORDER — ONDANSETRON 2 MG/ML
INJECTION INTRAMUSCULAR; INTRAVENOUS AS NEEDED
Status: DISCONTINUED | OUTPATIENT
Start: 2024-03-28 | End: 2024-03-28 | Stop reason: SURG

## 2024-03-28 RX ORDER — GLYCOPYRROLATE 0.2 MG/ML
INJECTION, SOLUTION INTRAMUSCULAR; INTRAVENOUS AS NEEDED
Status: DISCONTINUED | OUTPATIENT
Start: 2024-03-28 | End: 2024-03-28 | Stop reason: SURG

## 2024-03-28 RX ORDER — CEFAZOLIN SODIUM/WATER 2 G/20 ML
2 SYRINGE (ML) INTRAVENOUS ONCE
Status: COMPLETED | OUTPATIENT
Start: 2024-03-28 | End: 2024-03-28

## 2024-03-28 RX ORDER — SODIUM CHLORIDE, SODIUM LACTATE, POTASSIUM CHLORIDE, CALCIUM CHLORIDE 600; 310; 30; 20 MG/100ML; MG/100ML; MG/100ML; MG/100ML
INJECTION, SOLUTION INTRAVENOUS CONTINUOUS
Status: DISCONTINUED | OUTPATIENT
Start: 2024-03-28 | End: 2024-03-28

## 2024-03-28 RX ORDER — MORPHINE SULFATE 4 MG/ML
6 INJECTION, SOLUTION INTRAMUSCULAR; INTRAVENOUS EVERY 5 MIN PRN
Status: DISCONTINUED | OUTPATIENT
Start: 2024-03-28 | End: 2024-03-28

## 2024-03-28 RX ORDER — ACETAMINOPHEN 500 MG
1000 TABLET ORAL ONCE AS NEEDED
Status: DISCONTINUED | OUTPATIENT
Start: 2024-03-28 | End: 2024-03-28

## 2024-03-28 RX ORDER — PROCHLORPERAZINE EDISYLATE 5 MG/ML
5 INJECTION INTRAMUSCULAR; INTRAVENOUS EVERY 8 HOURS PRN
Status: DISCONTINUED | OUTPATIENT
Start: 2024-03-28 | End: 2024-03-28

## 2024-03-28 RX ORDER — ACETAMINOPHEN 500 MG
1000 TABLET ORAL ONCE
Status: DISCONTINUED | OUTPATIENT
Start: 2024-03-28 | End: 2024-03-28 | Stop reason: HOSPADM

## 2024-03-28 RX ORDER — ONDANSETRON 2 MG/ML
4 INJECTION INTRAMUSCULAR; INTRAVENOUS EVERY 6 HOURS PRN
Status: DISCONTINUED | OUTPATIENT
Start: 2024-03-28 | End: 2024-03-28

## 2024-03-28 RX ORDER — ESMOLOL HYDROCHLORIDE 10 MG/ML
INJECTION INTRAVENOUS AS NEEDED
Status: DISCONTINUED | OUTPATIENT
Start: 2024-03-28 | End: 2024-03-28 | Stop reason: SURG

## 2024-03-28 RX ORDER — MORPHINE SULFATE 4 MG/ML
2 INJECTION, SOLUTION INTRAMUSCULAR; INTRAVENOUS EVERY 5 MIN PRN
Status: DISCONTINUED | OUTPATIENT
Start: 2024-03-28 | End: 2024-03-28

## 2024-03-28 RX ORDER — MIDAZOLAM HYDROCHLORIDE 1 MG/ML
INJECTION INTRAMUSCULAR; INTRAVENOUS AS NEEDED
Status: DISCONTINUED | OUTPATIENT
Start: 2024-03-28 | End: 2024-03-28 | Stop reason: SURG

## 2024-03-28 RX ADMIN — SODIUM CHLORIDE, SODIUM LACTATE, POTASSIUM CHLORIDE, CALCIUM CHLORIDE: 600; 310; 30; 20 INJECTION, SOLUTION INTRAVENOUS at 14:19:00

## 2024-03-28 RX ADMIN — SODIUM CHLORIDE, SODIUM LACTATE, POTASSIUM CHLORIDE, CALCIUM CHLORIDE: 600; 310; 30; 20 INJECTION, SOLUTION INTRAVENOUS at 15:28:00

## 2024-03-28 RX ADMIN — ONDANSETRON 4 MG: 2 INJECTION INTRAMUSCULAR; INTRAVENOUS at 14:41:00

## 2024-03-28 RX ADMIN — LIDOCAINE HYDROCHLORIDE 5 ML: 10 INJECTION, SOLUTION EPIDURAL; INFILTRATION; INTRACAUDAL; PERINEURAL at 14:26:00

## 2024-03-28 RX ADMIN — SODIUM CHLORIDE, SODIUM LACTATE, POTASSIUM CHLORIDE, CALCIUM CHLORIDE: 600; 310; 30; 20 INJECTION, SOLUTION INTRAVENOUS at 16:01:00

## 2024-03-28 RX ADMIN — ROCURONIUM BROMIDE 25 MG: 10 INJECTION, SOLUTION INTRAVENOUS at 14:26:00

## 2024-03-28 RX ADMIN — ESMOLOL HYDROCHLORIDE 20 MG: 10 INJECTION INTRAVENOUS at 14:50:00

## 2024-03-28 RX ADMIN — METOCLOPRAMIDE HYDROCHLORIDE 10 MG: 5 INJECTION INTRAMUSCULAR; INTRAVENOUS at 14:42:00

## 2024-03-28 RX ADMIN — MIDAZOLAM HYDROCHLORIDE 2 MG: 1 INJECTION INTRAMUSCULAR; INTRAVENOUS at 14:26:00

## 2024-03-28 RX ADMIN — NEOSTIGMINE METHYLSULFATE 3 MG: 1 INJECTION, SOLUTION INTRAVENOUS at 15:28:00

## 2024-03-28 RX ADMIN — PHENYLEPHRINE HCL 100 MCG: 10 MG/ML VIAL (ML) INJECTION at 14:44:00

## 2024-03-28 RX ADMIN — GLYCOPYRROLATE 0.2 MG: 0.2 INJECTION, SOLUTION INTRAMUSCULAR; INTRAVENOUS at 15:28:00

## 2024-03-28 RX ADMIN — CEFAZOLIN SODIUM/WATER 2 G: 2 G/20 ML SYRINGE (ML) INTRAVENOUS at 14:40:00

## 2024-03-28 RX ADMIN — DEXAMETHASONE SODIUM PHOSPHATE 8 MG: 4 MG/ML VIAL (ML) INJECTION at 14:41:00

## 2024-03-28 NOTE — DISCHARGE INSTRUCTIONS
Plastic Surgery Home Care Instructions      We hope you were pleased with your care at [facility].  We wish you the best outcome and overall experience with your operation.  These instructions will help to minimize pain, optimize healing, and improve the likelihood of a successful result.    What To Expect  There will be some spotting of the incision lines for the next few days.  Mild discomfort at the surgical site is expected.   Please DO NOT apply heat or ice to the affected area    Bandages (Dressing)  Keep dressings clean and dry  Wear bra and ace wrap at all times as tolerated. You can adjust this if needed. If you get swelling above where the ace wrap is applied, you may need to apply a second ace wrap and wrap it like a back pack. You can apply extra padding for comfort if needed. If you notice increased bright red blood that is clotting and filling up rapidly, severe pain or swelling that is getting worse, call our office. You will likely be asked to remove the bandages to evaluate for bleeding.   You can change the drain dressings if you need to (if they get wet).     Drain Care  Proper drain care is an important part of your home care and will help to prevent fluid collection, minimize the risk for infection, and increase the success of your breast reconstruction.  Empty your drains at 8 am and 8 pm each day  DO NOT GET DRAIN SITES WET  Record each drain separately and use the drain sheet given to you to record the drainage. Follow the instructions on the drain sheet.  Wash your hands before and after emptying your drains  Bring drain sheet with you to your first office visit    Bathing/Showers  You will needs to sponge bathe until your drains are removed. You may shower, but only from the waist down. Before shower, take out all dressings from bra. Reinforce drain sites with additional waterproof dressings if needed. These will be given to you by the hospital. If some water gets underneath the  dressing during the shower, just replace with a new dry waterproof dressing.   DO NOT GET DRAIN SITES WET  No baths, swimming, or hot tubs until you receive medical permission    Pain Medication: Norco  Take one or two tablets every six hours as needed for pain.  Do not take narcotics, if you do not have pain.  Keep stools soft.  Take a stool softener (Colace).  You can take Miralax and/or milk of magnesia to help with any constipation. Follow instructions on packaging.   Eating fruit will also help to prevent constipation    Home Medication  Resume your home medications as instructed  Do not resume herbal medications for two weeks  Avoid NSAID's such as ibuprofen, Motrin, Aleve until you see us in the office    Diet  Resume your normal diet    Activity  No strenuous activity or heavy lifting (no lifting over 10 pounds)  No activities that involve your pectoralis muscles (no planks, push-ups, etc)  You can go up and down the stairs as tolerated.   You cannot return to work, if your work requires strenuous activity.  You cannot return to physical exercise, sports, or gym workouts until you are allowed to participate in strenuous activity.    Driving  Do not drive, if you are taking pain medication.    Return to Work or School  You can return to work when you are not taking pain medication, if your work does not involve strenuous activity.  Contact the office, if you need a medical note.     Follow-up Appointment   Our office will call you to set up a time  Call the office for an appointment in two days if you cannot remember the appointment details.    Verify your appointment date, day, time, and location.  At your 1st postoperative office visit:  Your drains will be examined, wounds will be evaluated, healing assessed, and any additional concerns and instructions will be discussed.    Questions or Concerns  Call our office if you notice increased bright red blood that is clotting and filling up rapidly, severe pain  or swelling that is getting worse    Harleen   Thank you for coming to [facility] for your operation.  The nurses and the anesthesiologist try very hard to make sure you receive the best care possible.  Your trust in them is greatly appreciated.    Thanks so much,  Dr. Clinton Coleman, PA-C  Yancy Mensah, FNP-C

## 2024-03-28 NOTE — BRIEF OP NOTE
Pre-Operative Diagnosis: Hematoma of left breast [N64.89]     Post-Operative Diagnosis: Hematoma of left breast [N64.89]      Procedure Performed:   LEFT BREAST HEMATOMA EVACUATION, DEBRIDEMENT OF MASECTOMY SKIN AND WOUND CLOSURE    Surgeon(s) and Role:     * Lorri Mendenhall MD - Primary    Assistant(s):  PA: Luz Coleman PA     Surgical Findings: see dictation     Specimen: see pathology     Estimated Blood Loss: Blood Output: 330 mL (3/28/2024  3:22 PM) (320 mL hematoma)    BLANCHE Awan  3/28/2024  4:00 PM

## 2024-03-28 NOTE — ANESTHESIA PREPROCEDURE EVALUATION
PRE-OP EVALUATION    Patient Name: Harleen Euceda    Admit Diagnosis: Hematoma of left breast [N64.89]    Pre-op Diagnosis: Hematoma of left breast [N64.89]    Left breast hematoma evacuation    Anesthesia Procedure: Left breast hematoma evacuation (Left)    Surgeon(s) and Role:     * Lorri Mendenhall MD - Primary    Pre-op vitals reviewed.  Temp: 98.2 °F (36.8 °C)  Pulse: 75  Resp: 16  BP: 131/89  SpO2: 100 %  Body mass index is 28.17 kg/m².    Current medications reviewed.  Hospital Medications:   [MAR Hold] acetaminophen (Tylenol Extra Strength) tab 1,000 mg  1,000 mg Oral Once    [MAR Hold] scopolamine (Transderm-Scop) 1 MG/3DAYS patch 1 patch  1 patch Transdermal Once    lactated ringers infusion   Intravenous Continuous    ceFAZolin (Ancef) 2 g in 20mL IV syringe premix  2 g Intravenous Once    EPINEPHrine (Adrenalin) 1 mg, lidocaine (Xylocaine) 1 % 50 mL in lactated ringers 1,000 mL tumescent mixture/irrigation   Infiltration Once (Intra-Op)       Outpatient Medications:     Medications Prior to Admission   Medication Sig Dispense Refill Last Dose    docusate sodium 100 MG Oral Cap Take 1 capsule (100 mg total) by mouth 2 (two) times daily. 30 capsule 1     HYDROcodone-acetaminophen 5-325 MG Oral Tab Take 1-2 tablets by mouth every 4 (four) hours as needed. 30 tablet 0     ondansetron (ZOFRAN) 4 mg tablet Take 1 tablet (4 mg total) by mouth every 8 (eight) hours as needed for Nausea. 12 tablet 0     ALPRAZolam 0.25 MG Oral Tab Take 1 tablet (0.25 mg total) by mouth 2 (two) times daily as needed. 30 tablet 1        Allergies: Other      Anesthesia Evaluation    Patient summary reviewed.    Anesthetic Complications  (+) history of anesthetic complications  History of: PONV       GI/Hepatic/Renal    Negative GI/hepatic/renal ROS.                             Cardiovascular    Negative cardiovascular ROS.                                                   Endo/Other    Negative endo/other ROS.                               Pulmonary    Negative pulmonary ROS.                       Neuro/Psych    Negative neuro/psych ROS.                                  Past Surgical History:   Procedure Laterality Date      ,,    EXTRACTION ERUPTED TOOTH/EXR      wisdom teeth    INSERT INTRAUTERINE DEVICE      Mirena insertion    MASTECTOMY LEFT  2024    MASTECTOMY RIGHT  2024    OTHER SURGICAL HISTORY Bilateral 2018    RLTCS with bilateral Salpingectomies    REMOVE INTRAUTERINE DEVICE      Mirena removal    SKIN SURGERY       Social History     Socioeconomic History    Marital status:    Tobacco Use    Smoking status: Never    Smokeless tobacco: Never   Vaping Use    Vaping Use: Never used   Substance and Sexual Activity    Alcohol use: Yes     Alcohol/week: 6.0 standard drinks of alcohol     Types: 6 Standard drinks or equivalent per week    Drug use: No   Other Topics Concern    Caffeine Concern Yes     Comment: 3 cups of coffee daily    Exercise No     History   Drug Use No     Available pre-op labs reviewed.  Lab Results   Component Value Date    WBC 7.5 2024    RBC 4.99 2024    HGB 15.0 2024    HCT 44.9 2024    MCV 90.0 2024    MCH 30.1 2024    MCHC 33.4 2024    RDW 14.3 2024    .0 2024     Lab Results   Component Value Date     2024    K 4.2 2024     2024    CO2 28.0 2024    BUN 15 2024    CREATSERUM 1.28 (H) 2024    GLU 85 2024    CA 9.1 2024            Airway      Mallampati: II  Mouth opening: 3 FB  TM distance: 4 - 6 cm  Neck ROM: full Cardiovascular    Cardiovascular exam normal.         Dental    Dentition appears grossly intact         Pulmonary    Pulmonary exam normal.                 Other findings              ASA: 2   Plan: general  NPO status verified and           Plan/risks discussed with: patient  Use of blood product(s) discussed  with: patient    Consented to blood products.          Present on Admission:  **None**

## 2024-03-28 NOTE — ANESTHESIA POSTPROCEDURE EVALUATION
[facility]    Harleen Euceda Patient Status:  Hospital Outpatient Surgery   Age/Gender 48 year old female MRN [id number]   Location [facility] SURGERY Attending Lorri Mendenhall MD   Hosp Day # 0 PCP Ezra Jones MD       Anesthesia Post-op Note    LEFT BREAST HEMATOMA EVACUATION, DEBRIDEMENT OF MASECTOMY SKIN AND WOUND CLOSURE    Procedure Summary       Date: 03/28/24 Room / Location:  MAIN OR 03 / [facility] MAIN OR    Anesthesia Start: 1419 Anesthesia Stop: 1601    Procedure: LEFT BREAST HEMATOMA EVACUATION, DEBRIDEMENT OF MASECTOMY SKIN AND WOUND CLOSURE (Left: Breast) Diagnosis:       Hematoma of left breast      (Hematoma of left breast [N64.89])    Surgeons: Lorri Mendenhall MD Anesthesiologist: Kumar Sumner MD    Anesthesia Type: general ASA Status: 2            Anesthesia Type: general    Vitals Value Taken Time   /93 03/28/24 1602   Temp 97.4 03/28/24 1602   Pulse 68 03/28/24 1602   Resp 14 03/28/24 1602   SpO2 100% 03/28/24 1602       Patient Location: PACU    Anesthesia Type: general    Airway Patency: patent and extubated    Postop Pain Control: adequate    Mental Status: preanesthetic baseline    Nausea/Vomiting: none    Cardiopulmonary/Hydration status: stable euvolemic    Complications: no apparent anesthesia related complications    Postop vital signs: stable    Dental Exam: Unchanged from Preop    Patient to be discharged from PACU when criteria met.

## 2024-03-28 NOTE — ANESTHESIA PROCEDURE NOTES
Airway  Date/Time: 3/28/2024 2:28 PM  Urgency: elective    Airway not difficult    General Information and Staff    Patient location during procedure: OR  Anesthesiologist: Kumar Sumner MD  Performed: anesthesiologist   Performed by: Kumar Sumner MD  Authorized by: Kumar Sumner MD      Indications and Patient Condition  Indications for airway management: anesthesia  Spontaneous Ventilation: absent  Sedation level: deep  Preoxygenated: yes  Patient position: sniffing  MILS maintained throughout  Mask difficulty assessment: 1 - vent by mask  No planned trial extubation    Final Airway Details  Final airway type: endotracheal airway      Successful airway: ETT  Cuffed: yes   Successful intubation technique: direct laryngoscopy  Endotracheal tube insertion site: oral  Blade: Nicolas  Blade size: #3  ETT size (mm): 7.0    Cormack-Lehane Classification: grade I - full view of glottis  Placement verified by: capnometry   Cuff volume (mL): 8  Measured from: lips  ETT to lips (cm): 21  Number of attempts at approach: 1    Additional Comments  45 lido LTA before intubation

## 2024-03-29 NOTE — OPERATIVE REPORT
[facility]    PATIENT'S NAME: CECY REYES   ATTENDING PHYSICIAN: Lorri Mendenhall MD   OPERATING PHYSICIAN: Lorri Mendenhall MD   PATIENT ACCOUNT#:   [id number]    LOCATION:  [facility] 10  MEDICAL RECORD #:   [id number]       YOB: 1975  ADMISSION DATE:       03/28/2024      OPERATION DATE:  03/28/2024    OPERATIVE REPORT    PREOPERATIVE DIAGNOSIS:  Left breast hematoma.  POSTOPERATIVE DIAGNOSIS:  Left breast hematoma.  PROCEDURE:  Evacuation and washout of left breast hematoma (320 mL), debridement of nonviable mastectomy skin flap 3 sq cm, complex wound closure 15 cm.    ASSISTANT:  Luz Coleman PA-C.    ANESTHESIA:  General endotracheal anesthesia.    COMPLICATIONS:  None.    BLOOD LOSS:  Minimal.    INDICATIONS:  This is a 48-year-old female who underwent a bilateral mastectomy with Dr. Proctor and [place] wound closure with local tissue rearrangement with Dr. Alonzo 1 week ago.  She presented to the office yesterday with left breast hematoma.  There appeared to be no active bleeding at the time, the patient was clinically stable, and reported that this has been getting significantly better compared to the initial bruising and swelling on the day of surgery.  We discussed the advantages and disadvantages of conservative approach and continued compression wrapping versus evacuation of the hematoma.  She opted for evacuation of the hematoma and was added on to the OR schedule today.  Potential risks, complications, benefits, and alternatives were discussed.  Risks and complications include, but are not limited to, infection, bleeding, scarring, damage to surrounding structures, hematoma, seroma, wound dehiscence, need for further surgery.  Patient understands and wishes to proceed.  Questions were answered.    OPERATIVE TECHNIQUE:  Patient was identified in the preoperative area, informed consent was obtained, the site was marked.  Patient was then brought back to the  operating room, placed in the supine position.  She was adequately padded.  Sequential compression devices were applied.  General endotracheal anesthesia was administered.  Perioperative antibiotics were given.  Then, her entire chest and breasts were prepped and draped in the usual sterile fashion.  Then, the procedure was started with opening up the inframammary fold incision, and 320 mL of old hematoma were evacuated.  Then, the pocket was thoroughly irrigated with antibiotic solution, Irrisept, and Betadine solution.  Hemostasis was assured.  No active bleeding was seen, only minimal oozing.  After hemostasis was assured, the skin flaps were evaluated, and the left lateral skin flap at the T junction showed skin flap necrosis of approximately 1 x 3 cm.  This area was excised and sent to Pathology.  Then, bleeding was seen on the wound edges, but areas of superficial epidermolysis were present next to it.  In order to preserve the shape of the reconstructed skin flap, no further debridement was performed, and 2 new 15 round HO suction drains were placed and secured.  Then, the skin flaps were inset, and 3-0 Vicryl sutures were used for the deep dermal layer followed by 4-0 Monocryl subcuticular sutures for the skin.  Bacitracin and Xeroform were applied.  Fluffs and a bra were applied.  Also, the right breast drain was removed as the output was low, and the patient wanted it removed in the OR rather than yesterday in the clinic.  After the fluffs and a bra, a 6-inch Ace bandage was wrapped around her chest for additional light compression.  Patient tolerated the procedure well and awoke from anesthesia without difficulty.  All sponge and needle counts were correct at the end of the case.    Dictated By Lorri Mendenhall MD  d: 03/28/2024 17:02:40  t: 03/28/2024 18:03:32  Job [id number]  \[facility]\""/

## 2024-04-01 ENCOUNTER — NURSE ONLY (OUTPATIENT)
Dept: SURGERY | Facility: CLINIC | Age: 49
End: 2024-04-01
Payer: COMMERCIAL

## 2024-04-01 NOTE — PROGRESS NOTES
Harleen Euceda is a 48 year old female who presents today for a follow-up after evacuation and washout of the left breast hematoma, debridement of nonviable mastectomy skin flap and complex wound closure by Dr. Mendenhall on 3/28/2024.    She is also s/p  injection of isosulfan blue into the 4-quadrant periareolar left breast, bilateral mastectomy, left axillary sentinel lymph node x3 (Dr. Proctor) and Flanagan pattern skin rearrangement, bilateral breasts, with the rearranged surface area of 160 sq cm., Complex closure of left axillary wound totaling 6 cm with Dr. Alonzo on 3/20/2024.       She denies fever and chills. She denies nausea, vomiting, diarrhea or constipation. She denies shortness of breath or chest pain.     Her pain is controlled.        Physical Exam        Breasts: bilateral breast mastectomy skin is viable. Bilateral incisions with delayed wound healing at the T-junctions.  Minimal left breast ecchymosis consistent with previous hematoma. Left drains are clean,dry, and intact with dark sanguinous effluent. No sign of hematoma or seroma bilaterally. No erythema bilaterally. Bilateral breast are without swelling and appear the same size.       There were no vitals filed for this visit.      Assessment and Plan     Harleen Euceda is doing well s/p evacuation and washout of the left breast hematoma, debridement of nonviable mastectomy skin flap and complex wound closure by Dr. Mendenhall on 3/28/2024.    She is also s/p  injection of isosulfan blue into the 4-quadrant periareolar left breast, bilateral mastectomy, left axillary sentinel lymph node x3 (Dr. Protcor) and Flanagan pattern skin rearrangement, bilateral breasts, with the rearranged surface area of 160 sq cm., Complex closure of left axillary wound totaling 6 cm with Dr. Alonzo on 3/20/2024.     The left breast drains (x2) remain in place today per Dr. Mendenhall's instructions. A new drain site dressing of biopatch and tegaderm was placed. The right drain site was  scabbed over and left open to air but a  blister measuring 4cm x 2 cm lateral to the drain site was covered with neosporin, telfa and paper tape. Patient was instructed to change this daily.     The bilateral breast incision were cleaned with alcohol. The incision was redressed with an enhancement dressing of neosporin, xeroform and telfa. The dressing was secured with paper tape. Written dressing instruction and supplies were given to the patient. Patient will change dressing daily. The compression bra was reapplied and then an ACE wrap was applied for additional compression.     We discussed showering instruction and continued activity restrictions.     She will follow up on 4/26/24 or sooner if drains are ready for removal.    Questions were answered. Patient understands.     Missy Viera RN  4/1/2024  8:47AM

## 2024-04-02 ENCOUNTER — TELEPHONE (OUTPATIENT)
Dept: SURGERY | Facility: CLINIC | Age: 49
End: 2024-04-02

## 2024-04-02 ENCOUNTER — NURSE NAVIGATOR ENCOUNTER (OUTPATIENT)
Dept: HEMATOLOGY/ONCOLOGY | Facility: HOSPITAL | Age: 49
End: 2024-04-02

## 2024-04-02 ENCOUNTER — OFFICE VISIT (OUTPATIENT)
Facility: LOCATION | Age: 49
End: 2024-04-02
Payer: COMMERCIAL

## 2024-04-02 ENCOUNTER — OFFICE VISIT (OUTPATIENT)
Dept: HEMATOLOGY/ONCOLOGY | Facility: HOSPITAL | Age: 49
End: 2024-04-02
Attending: SURGERY
Payer: COMMERCIAL

## 2024-04-02 VITALS
HEIGHT: 61.81 IN | SYSTOLIC BLOOD PRESSURE: 152 MMHG | OXYGEN SATURATION: 98 % | RESPIRATION RATE: 16 BRPM | TEMPERATURE: 98 F | WEIGHT: 159 LBS | HEART RATE: 80 BPM | BODY MASS INDEX: 29.26 KG/M2 | DIASTOLIC BLOOD PRESSURE: 91 MMHG

## 2024-04-02 VITALS — TEMPERATURE: 97 F | HEART RATE: 58 BPM

## 2024-04-02 DIAGNOSIS — C50.812 MALIGNANT NEOPLASM OF OVERLAPPING SITES OF LEFT BREAST IN FEMALE, ESTROGEN RECEPTOR POSITIVE (HCC): Primary | ICD-10-CM

## 2024-04-02 DIAGNOSIS — C50.912 MALIGNANT NEOPLASM OF LEFT FEMALE BREAST, UNSPECIFIED ESTROGEN RECEPTOR STATUS, UNSPECIFIED SITE OF BREAST (HCC): Primary | ICD-10-CM

## 2024-04-02 DIAGNOSIS — Z17.0 MALIGNANT NEOPLASM OF OVERLAPPING SITES OF LEFT BREAST IN FEMALE, ESTROGEN RECEPTOR POSITIVE (HCC): Primary | ICD-10-CM

## 2024-04-02 PROCEDURE — 99024 POSTOP FOLLOW-UP VISIT: CPT | Performed by: SURGERY

## 2024-04-02 PROCEDURE — 99215 OFFICE O/P EST HI 40 MIN: CPT | Performed by: INTERNAL MEDICINE

## 2024-04-02 NOTE — TELEPHONE ENCOUNTER
[facility] for a visit with Dr. May and spoke with Harleen (Breast Navigator) and had concerns for a blister on her back.  Harleen came to our office for dressing supplies to cover the patients blister.  Harleen then called our office stating that the patient had a few small blisters on her abdomen as well.  I was asked to call patient regarding her blisters and drains.      I called and spoke with the patient who informed me that both of her left breast drains have had less that 20 mL of output for the last 2 days and she would like to have them removed.  I explained to the patient that we can only remove one drain at a time due to both drains being in the same breast.  Patient was frustrated that she could not have both drains removed at the same time but verbalized understanding.  Patient was offered an appointment for 4/3/24 @ 10 am.  Patient was agreeable to the appointment and location.  Patient was appreciative of the call and verbalized understanding.

## 2024-04-02 NOTE — PROGRESS NOTES
Patient is here for follow up for breast cancer after surgery on 3/20.  She is feeling better.  She takes occasional [place].   She has unusual sensations to the breast but not ongoing pain.  She still has drains in place.  She has a blister on her chest and is waiting for a return call from surgery or plastics.

## 2024-04-02 NOTE — PROGRESS NOTES
Met with patient and her mother during Dr. May's clinic. She stated she has her mastectomy and needed an evacuation of a hematoma with Dr. Mendenhall. Stated she has an approximately a 2 cm blister to her right flank. Changed her dressing, no drainage. Notified plastics about her wanting to speak to them and wanting a phone call back from them. Provided more supplies for home and instructions to continue daily dressing changes to site. Discussed ladies bout[employer] for wigs and prescriptions and chemotherapy to start without port after cleared from plastics after drain removal.

## 2024-04-02 NOTE — PROGRESS NOTES
Cancer Center Progress Note    Problem List:      Patient Active Problem List   Diagnosis    Fibroadenoma of left breast    Varicose vein    Arthritis    Malignant neoplasm of upper-outer quadrant of left breast in female, estrogen receptor positive (HCC)    Esophageal obstruction due to food impaction    Hematoma of left breast    Absence of breast, bilateral       Interim History:    Harleen Euceda presents today for evaluation and management of a diagnosis of left breast cancer.    Since last visit the patient had bilateral mastectomy by Dr. Proctor. The right breast was benign. The left breast had a 90 mm area of invasive lobular carcinoma. The margins were negative. The SLN procedure had 0/3 nodes. She has surgical drains. She has no other new complaints. She had a negative metastatic work up with CT of the CAP and bone scan.     She initially had a screening mammogram on 11/27/2023 that showed asymmetry in the left breast. She had diagnostic mammogram and US on 12/11/2023 that showed a 5 x 4 x 5 mm nodule. She had US biopsy on 12/22/2023 that showed invasive lobular carcinoma, grade II with %, %, Ki-67 12% and Her2 2+ IHC; FISH not amplified. She had an MRI of the breasts on 1/4/2024 that showed more extensive abnormality in the left breast with an 11 cm area of enhancement. There were no abnormal appearing axillary nodes.       Review of Systems:   Constitutional: Negative for anorexia, fatigue, fevers, chills, night sweats and weight loss.  Eyes: Negative for visual disturbance, irritation and redness.  Respiratory: Negative for cough, hemoptysis, chest pain, or dyspnea.  Cardiovascular: Negative for angina, orthopnea or palpitations.  Gastrointestinal: Negative for nausea, vomiting, change in bowel habits, diarrhea, constipation and abdominal pain.  Integument/breast: Negative for rash, skin lesions, and pruritus.  Hematologic/lymphatic: Negative for easy bruising, bleeding, and  lymphadenopathy.  Musculoskeletal: Negative for myalgias, arthralgias, muscle weakness.  Genitourinary: Negative for dysuria or hematuria  Neurological: Negative for headaches, dizziness, speech problems, gait problems and focal weakness.  Psychiatric: The patient's mood was calm and appropriate for this visit.  The pertinent positives and negatives were described. All other systems were negative.    PMH/PSH:  Past Medical History:   Diagnosis Date    Anxiety state     Back problem     Breast cancer (HCC)     Esophageal obstruction due to food impaction 2024    Fibroadenoma of left breast 2015    Hx of motion sickness     PONV (postoperative nausea and vomiting)     with last  over sedated and o2 sat dropped    Varicose vein 2015    Visual impairment        Past Surgical History:   Procedure Laterality Date      ,,    EXTRACTION ERUPTED TOOTH/EXR      wisdom teeth    INSERT INTRAUTERINE DEVICE      Mirena insertion    MASTECTOMY LEFT  2024    MASTECTOMY RIGHT  2024    OTHER SURGICAL HISTORY Bilateral 2018    RLTCS with bilateral Salpingectomies    REMOVE INTRAUTERINE DEVICE      Mirena removal    SKIN SURGERY         Family History Reviewed:  Family History   Problem Relation Age of Onset    Hypertension Father     Diabetes Father     Heart Disorder Father 52        quad bypass    Other (hypothyroid) Father     Breast Cancer Mother 46    Hypertension Mother     Psychiatric Mother         depression    Breast Cancer Maternal Grandmother 63    Other (Cholangiocarinoma) Brother 36       Allergies:     Allergies   Allergen Reactions    Other SHORTNESS OF BREATH     Peonies and lemongrass.         Medications:   ALPRAZolam 0.25 MG Oral Tab Take 1 tablet (0.25 mg total) by mouth 2 (two) times daily as needed. 30 tablet 1         Vital Signs:      Height: --  Weight: 72.1 kg (159 lb) ( 1001)  BSA (Calculated - sq m): --  Pulse: 80 (  1001)  BP: 152/91 (04/02 1001)  Temp: 97.9 °F (36.6 °C) (04/02 1001)  Do Not Use - Resp Rate: --  SpO2: 98 % (04/02 1001)      Performance Status:  ECOG 0: Fully active, able to carry on all pre-disease performance without restriction     Physical Examination:    Constitutional: Patient is alert and oriented x 3, not in acute distress.  Psychiatric: The patient's mood is calm and appropriate for this visit.      Labs reviewed at this visit:     Lab Results   Component Value Date    WBC 7.5 03/11/2024    RBC 4.99 03/11/2024    HGB 15.0 03/11/2024    HCT 44.9 03/11/2024    MCV 90.0 03/11/2024    MCH 30.1 03/11/2024    MCHC 33.4 03/11/2024    RDW 14.3 03/11/2024    .0 03/11/2024     Lab Results   Component Value Date     03/11/2024    K 4.2 03/11/2024     03/11/2024    CO2 28.0 03/11/2024    BUN 15 03/11/2024    CREATSERUM 1.28 (H) 03/11/2024    GLU 85 03/11/2024    CA 9.1 03/11/2024    ALKPHO 75 03/11/2024    ALT 19 03/11/2024    AST 13 (L) 03/11/2024    BILT 0.5 03/11/2024    ALB 3.9 03/11/2024    TP 7.3 03/11/2024     Pathology from 3/20/2024:  Final Diagnosis:   A.  Excision, left sentinel lymph node #1:  -1 lymph node (0/1), negative for malignancy.     B.  Excision, left sentinel lymph node #2:  -1 lymph node (0/1), negative for malignancy.     C.  Excision, left sentinel lymph node #3:  -1 lymph node (0/1), negative for malignancy.     D.  Right mastectomy:  -Breast tissue with focal atypical lobular hyperplasia (ALH).  -Background breast tissue with fibrocystic changes including stromal fibrosis, usual ductal hyperplasia and cystic apocrine metaplasia.  -Unremarkable skin.  -Negative for malignancy.     E.  Left mastectomy:  -Invasive lobular carcinoma, grade 2, measuring approximately 90 mm (9 cm).        -Associated microcalcifications.  -Extensive lobular carcinoma in situ (LCIS).   -Invasive tumor is 3 mm (0.3 cm) from the closest deep margin of resection.  -No lymphovascular invasion is  identified.   -Biopsy site changes are present.   -Unremarkable skin.   -Background breast tissue with fibrocystic changes including cystic apocrine metaplasia and fibroadenomatous changes.        Radiologic imaging reviewed at this visit:    Bone Scan on 1/15/2024:  FINDINGS:    IMAGED AREA:  Whole-body  ABNORMALITIES:  None.  OTHER:  Soft tissue nodule in the lateral aspect of the left breast is noted.  Degenerative changes in the shoulders and knees are noted.  There is scoliosis of the thoracolumbar spine..     Impression   CONCLUSION:    1. No evidence of osseous metastatic disease in the body.  2. Scoliosis of the thoracolumbar spine is noted.       CT CAP on 1/15/2024:  FINDINGS:       CHEST:    LUNGS:  No nodules or infiltrates.  No bronchiectasis.  MEDIASTINUM:  No adenopathy.  PENNY:  No adenopathy.  CARDIAC:  No pericardial effusion.  No significant coronary calcifications.  PLEURA:  No pleural effusion.  CHEST WALL:  There is a left axillary lymph node measuring 1.3 x 1.0 cm.  AORTA:  Normal caliber.  VASCULATURE:  Smooth tapering.     ABDOMEN/PELVIS:  LIVER:  Uniform parenchyma.  BILIARY:  Nondistended gallbladder.  No biliary dilatation.  PANCREAS:  Uniform parenchyma.  No ductal dilatation.  SPLEEN:  Not enlarged.  KIDNEYS:  Normal anatomic positions.  No hydronephrosis or perinephric stranding.  Uniform parenchyma.  ADRENALS:  Not enlarged.  AORTA:  Smooth tapering.  Patent celiac artery, SMA and KRISTI.  Patent splenic vein and portal vein.  RETROPERITONEUM:  No adenopathy.  BOWEL/MESENTERY:  Normal bowel caliber.  Moderate stool in the colon.  No ascites.  ABDOMINAL WALL:  No hernia.  URINARY BLADDER:  Nondistended.  PELVIC NODES:  None enlarged.  PELVIC ORGANS:  No uterine or ovarian abnormality.  BONES:  Moderate to severe degenerative changes lower cervical spine and L4-L5 facet joints.  Milder changes elsewhere.  Moderate apex left thoracolumbar scoliosis.  Normal vertebral body heights.      Impression   CONCLUSION:    1. Borderline enlarged left axillary lymph node.  2. No additional metastatic findings in the chest, abdomen or pelvis.  3. Details as above.          Assessment/Plan:     Invasive lobular carcinoma of the overlapping quadrants of the left breast:  Clincial T3 disease with 11 cm of MRI abnormality  Clinical N0 disease  %  %  Ki-67 12%  Her2 2+ IHC  Her2 not amplied FISH  Bilateral Mastectomy on 3/20/2024:  Right breast benign  Left breast with 9 cm of ILC  SLN 0/3     The patient has a Luminal breast cancer with strong ER/DC positive and low grade and low Ki-67. She has a very large breast cancer. I recommended adjuvant systemic chemotherapy. I discussed the dose dense AC/T regimen versus the TC regimen. I discussed the risks in detail. We discussed the data comparing these two regimens. Given the large size of her tumor, there is little data in this situation and being more aggressive with the AC/T regimen would be considered optimal but after a full discussion she has decided to proceed with the TC x 4 regimen in order to minimize her cardiac risk and the risk of neuropathy. She will try to have the chemotherapy peripherally. She still needs to heal more from the mastectomy. She still has drains. We will schedule the chemotherapy. We will start this without growth factor but we will follow weekly blood counts.    After chemotherapy she will need radiation therapy to the chest wall. I would also recommend adjuvant endocrine therapy with OFS and an AI. We will also consider treating with a CDK4/6 inhibitor given the locally advanced disease.    This visit lasted 45 minutes with the entire visit for discussion of her pathology and her management as above. An additional 5 minutes for post visit charting.    Deejay May MD

## 2024-04-03 ENCOUNTER — NURSE ONLY (OUTPATIENT)
Dept: SURGERY | Facility: CLINIC | Age: 49
End: 2024-04-03
Payer: COMMERCIAL

## 2024-04-03 PROCEDURE — 99024 POSTOP FOLLOW-UP VISIT: CPT | Performed by: SURGERY

## 2024-04-03 NOTE — PROGRESS NOTES
Harleen Euceda is a 48 year old female who presents today for a follow-up after evacuation and washout of the left breast hematoma, debridement of nonviable mastectomy skin flap and complex wound closure by Dr. Mendenhall on 3/28/2024.     She is also s/p  injection of isosulfan blue into the 4-quadrant periareolar left breast, bilateral mastectomy, left axillary sentinel lymph node x3 (Dr. Proctor) and Flanagan pattern skin rearrangement, bilateral breasts, with the rearranged surface area of 160 sq cm., Complex closure of left axillary wound totaling 6 cm with Dr. Alonzo on 3/20/2024.         She denies fever and chills. She denies nausea, vomiting, diarrhea or constipation. She denies shortness of breath or chest pain.     Her pain is controlled on tylenol.         Physical Exam         Breasts: bilateral breast mastectomy skin is viable. Bilateral incisions with delayed wound healing at the T-junctions.  No sign of ecchymosis or hematoma bilaterally. Left drains are clean,dry, and intact with serosanguinous  effluent.  No erythema bilaterally.         There were no vitals filed for this visit.        Assessment and Plan      Harleen Euceda is doing well s/p evacuation and washout of the left breast hematoma, debridement of nonviable mastectomy skin flap and complex wound closure by Dr. Mendenhall on 3/28/2024.     She is also s/p  injection of isosulfan blue into the 4-quadrant periareolar left breast, bilateral mastectomy, left axillary sentinel lymph node x3 (Dr. Proctor) and Flanagan pattern skin rearrangement, bilateral breasts, with the rearranged surface area of 160 sq cm., Complex closure of left axillary wound totaling 6 cm with Dr. Alonzo on 3/20/2024.      One of the left breast drains was removed today due to low output. Patient tolerated this well. A drain site dressing of neosporin and 2x2 gauze was placed and secured with Tegaderm . A new drain site dressing of biopatch and tegaderm was placed over the remaining left  breast drain. The right lateral breast blister measuring 4cm x 2 cm was covered with neosporin, xeroform and telfa. There were two small 1mm x 1mm bilateral on the bilateral upper abdomen that were dressed with neosporin and telfa. We discontinued all use of paper tape due to the blistering. Patient was instructed to change this daily.      The bilateral breast incision were cleaned with alcohol. The incisions were redressed with an enhancement dressing of neosporin, xeroform and telfa. Written dressing instruction and supplies were given to the patient. Patient will change dressing daily. The compression bra was reapplied.     We discussed showering instruction and continued activity restrictions. Patient will start chemo in a few weeks and will require radiation after the completion of chemo.      She will follow up on 4/26/24 or sooner if drains are ready for removal.     Questions were answered. Patient understands.      Missy Viera RN  4/3/24  10:31am

## 2024-04-05 ENCOUNTER — NURSE ONLY (OUTPATIENT)
Dept: SURGERY | Facility: CLINIC | Age: 49
End: 2024-04-05
Payer: COMMERCIAL

## 2024-04-05 PROCEDURE — 99024 POSTOP FOLLOW-UP VISIT: CPT | Performed by: SURGERY

## 2024-04-05 NOTE — PROGRESS NOTES
Harleen Euceda is a 48 year old female who presents today for a follow-up after evacuation and washout of the left breast hematoma, debridement of nonviable mastectomy skin flap and complex wound closure by Dr. Mendenhall on 3/28/2024.     She is also s/p  injection of isosulfan blue into the 4-quadrant periareolar left breast, bilateral mastectomy, left axillary sentinel lymph node x3 (Dr. Proctor) and Flanagan pattern skin rearrangement, bilateral breasts, with the rearranged surface area of 160 sq cm., Complex closure of left axillary wound totaling 6 cm with Dr. Alonzo on 3/20/2024.         She denies fever and chills. She denies nausea, vomiting, diarrhea or constipation. She denies shortness of breath or chest pain.     Her pain is controlled on tylenol.         Physical Exam         Breasts: bilateral breast mastectomy skin is viable. Bilateral incisions with resolving delayed wound healing at the T-junctions.  No sign of ecchymosis or hematoma bilaterally. Left drain clean,dry, and intact with serosanguinous  effluent.  No erythema bilaterally.         There were no vitals filed for this visit.        Assessment and Plan      Harleen Euceda is doing well s/p evacuation and washout of the left breast hematoma, debridement of nonviable mastectomy skin flap and complex wound closure by Dr. Mendenhall on 3/28/2024.     She is also s/p  injection of isosulfan blue into the 4-quadrant periareolar left breast, bilateral mastectomy, left axillary sentinel lymph node x3 (Dr. Proctor) and Flanagan pattern skin rearrangement, bilateral breasts, with the rearranged surface area of 160 sq cm., Complex closure of left axillary wound totaling 6 cm with Dr. Alonzo on 3/20/2024.      The final left breast drains was removed today due to low output. Patient tolerated this well. A drain site dressing of neosporin and 2x2 gauze was placed and secured with paper tape .      The right lateral breast blister measuring 4cm x 2 cm was covered with  neosporin, xeroform and telfa. There were two small 1mm x 1mm bilateral on the bilateral upper abdomen that were dressed with neosporin and telfa. Patient was instructed to change this daily.      The bilateral breast incision were cleaned with alcohol. The incisions were redressed with an enhancement dressing of neosporin and telfa.  Written dressing instruction and supplies were given to the patient. Patient will change dressing daily. The compression bra was reapplied.     We discussed showering instruction and continued activity restrictions. Patient will start chemo in a few weeks and will require radiation after the completion of chemo.      She will follow up on 4/26/24 for wound check.     Questions were answered. Patient understands.      Missy Viera RN  4/5/24  9:19am

## 2024-04-09 ENCOUNTER — OFFICE VISIT (OUTPATIENT)
Dept: HEMATOLOGY/ONCOLOGY | Age: 49
End: 2024-04-09
Attending: SURGERY
Payer: COMMERCIAL

## 2024-04-09 DIAGNOSIS — Z71.9 HEALTH EDUCATION/COUNSELING: ICD-10-CM

## 2024-04-09 DIAGNOSIS — Z17.0 MALIGNANT NEOPLASM OF OVERLAPPING SITES OF LEFT BREAST IN FEMALE, ESTROGEN RECEPTOR POSITIVE (HCC): Primary | ICD-10-CM

## 2024-04-09 DIAGNOSIS — C50.812 MALIGNANT NEOPLASM OF OVERLAPPING SITES OF LEFT BREAST IN FEMALE, ESTROGEN RECEPTOR POSITIVE (HCC): Primary | ICD-10-CM

## 2024-04-09 PROCEDURE — 99215 OFFICE O/P EST HI 40 MIN: CPT | Performed by: NURSE PRACTITIONER

## 2024-04-09 PROCEDURE — 99417 PROLNG OP E/M EACH 15 MIN: CPT | Performed by: NURSE PRACTITIONER

## 2024-04-09 RX ORDER — PROCHLORPERAZINE MALEATE 10 MG
10 TABLET ORAL EVERY 6 HOURS PRN
Qty: 30 TABLET | Refills: 3 | Status: SHIPPED | OUTPATIENT
Start: 2024-04-09

## 2024-04-09 RX ORDER — ONDANSETRON HYDROCHLORIDE 8 MG/1
8 TABLET, FILM COATED ORAL EVERY 8 HOURS PRN
Qty: 30 TABLET | Refills: 3 | Status: SHIPPED | OUTPATIENT
Start: 2024-04-09

## 2024-04-09 RX ORDER — DEXAMETHASONE 4 MG/1
8 TABLET ORAL 2 TIMES DAILY WITH MEALS
Qty: 24 TABLET | Refills: 4 | Status: SHIPPED | OUTPATIENT
Start: 2024-04-09

## 2024-04-09 NOTE — PROGRESS NOTES
IV Chemotherapy Education    Drug names:  Docetaxel and cyclophosphamide    Chemotherapy education goals:  Learn the drug names  Administration schedule  Routes of administration  Treatment setting    Chemotherapy action on cancer / normal cells    Treatment Effects on Bone Marrow:    Chemotherapy action on cancer / normal cells}  Function of white blood cells / signs of infection  Function of red blood cells / signs of anemia:    Function of platelets / signs of bleeding:    Notify MD/RN of any chills or fever 100.5 and above:     Treatment Effects on Nutritional Status/Mucous Membranes:    Appropriate oral hygiene / signs of stomatitis/oral lesions  Oral rinses procedure    Changes in taste perception / appetite  Nausea and vomiting / use of anti-nausea medications.  Diarrhea / constipation / dietary changes    Treatment Effects on Hair:    Possibility of hair loss     Treatment Effects on Neurological System:    Potential numbness / tingling in hands or feet/Notify MD/RN of any numbness / tingling at next visit    Treatment Effects on the Bladder and Kidneys:    Function of the kidneys and bladder  Signs / symptoms of hemorrhagic cystitis  Suggested fluid intake     Notify MD/RN if blood appears in urine or if you have a decreased urine output     Treatment Effects on Reproductive System:    Avoid pregnancy / use of barrier birth control methods:    Possible sterility, impotence, changes in sex drive:      Treatment Effects on Emotional Status:    Potential mood changes, depression, nervousness, difficulty sleeping  Importance of support system  Notify MD/RN of any emotional changes    Vesicants / Irritants:    Potential extravasation at site of administration   Signs / symptoms include redness, swelling, pain, burning, or blistering at the site of administration   To Notify MD/RN IMMEDIATELY if any of the above signs / symptoms occur         Teaching Materials Provided:      [facility] Chemotherapy information  sheets  When to contact the Treatment Team Information Sheet  Side Effect Management Information Sheet  [facility] Support Services Sheet  Dietician information sheet    Patient was given ample opportunity to ask questions.  All questions and concerns addressed.    Chemotherapy Consent Form signed by the patient.    Today we reviewed breast cancer, chemotherapy, and treatment plan. Discussed side effects of each drug in detail, importance of close follow up, lab monitoring and symptom management. Infection and bleeding precautions reviewed while on chemo. Home medications and schedule reviewed.  Scheduled to start treatment 24        Encounter Times  PreCharting:   minutes    Reviewing/Obtainin minutes      Medical Exam:   minutes    Plan:   minutes      Notes:   minutes    Counseling/Education: 55 minutes      Referring/Communicating:   minutes    Ind Interpretation:   minutes      Care Coordination:   minutes       My total time spent caring for the patient on the day of the encounter: 60 minutes.     Barbara ZHU - BC  Nurse Practitioner  [facility]

## 2024-04-25 ENCOUNTER — OFFICE VISIT (OUTPATIENT)
Dept: HEMATOLOGY/ONCOLOGY | Age: 49
End: 2024-04-25
Attending: SURGERY
Payer: COMMERCIAL

## 2024-04-25 ENCOUNTER — NURSE ONLY (OUTPATIENT)
Dept: HEMATOLOGY/ONCOLOGY | Age: 49
End: 2024-04-25
Attending: SURGERY
Payer: COMMERCIAL

## 2024-04-25 ENCOUNTER — SOCIAL WORK SERVICES (OUTPATIENT)
Dept: HEMATOLOGY/ONCOLOGY | Facility: HOSPITAL | Age: 49
End: 2024-04-25

## 2024-04-25 VITALS
OXYGEN SATURATION: 100 % | TEMPERATURE: 98 F | WEIGHT: 160.13 LBS | RESPIRATION RATE: 18 BRPM | BODY MASS INDEX: 29.47 KG/M2 | HEIGHT: 61.85 IN | SYSTOLIC BLOOD PRESSURE: 133 MMHG | DIASTOLIC BLOOD PRESSURE: 87 MMHG | HEART RATE: 85 BPM

## 2024-04-25 VITALS
OXYGEN SATURATION: 99 % | DIASTOLIC BLOOD PRESSURE: 90 MMHG | HEART RATE: 77 BPM | TEMPERATURE: 99 F | SYSTOLIC BLOOD PRESSURE: 150 MMHG | RESPIRATION RATE: 20 BRPM

## 2024-04-25 DIAGNOSIS — Z17.0 MALIGNANT NEOPLASM OF OVERLAPPING SITES OF LEFT BREAST IN FEMALE, ESTROGEN RECEPTOR POSITIVE (HCC): Primary | ICD-10-CM

## 2024-04-25 DIAGNOSIS — C50.812 MALIGNANT NEOPLASM OF OVERLAPPING SITES OF LEFT BREAST IN FEMALE, ESTROGEN RECEPTOR POSITIVE (HCC): Primary | ICD-10-CM

## 2024-04-25 DIAGNOSIS — T78.40XA HYPERSENSITIVITY REACTION, INITIAL ENCOUNTER: ICD-10-CM

## 2024-04-25 LAB
ALBUMIN SERPL-MCNC: 3.7 G/DL (ref 3.4–5)
ALBUMIN/GLOB SERPL: 1 {RATIO} (ref 1–2)
ALP LIVER SERPL-CCNC: 81 U/L
ALT SERPL-CCNC: 29 U/L
ANION GAP SERPL CALC-SCNC: 8 MMOL/L (ref 0–18)
AST SERPL-CCNC: 17 U/L (ref 15–37)
BASOPHILS # BLD AUTO: 0.07 X10(3) UL (ref 0–0.2)
BASOPHILS NFR BLD AUTO: 0.9 %
BILIRUB SERPL-MCNC: 0.6 MG/DL (ref 0.1–2)
BUN BLD-MCNC: 8 MG/DL (ref 9–23)
CALCIUM BLD-MCNC: 9.3 MG/DL (ref 8.5–10.1)
CHLORIDE SERPL-SCNC: 108 MMOL/L (ref 98–112)
CO2 SERPL-SCNC: 23 MMOL/L (ref 21–32)
CREAT BLD-MCNC: 1.12 MG/DL
EGFRCR SERPLBLD CKD-EPI 2021: 61 ML/MIN/1.73M2 (ref 60–?)
EOSINOPHIL # BLD AUTO: 0.73 X10(3) UL (ref 0–0.7)
EOSINOPHIL NFR BLD AUTO: 9.2 %
ERYTHROCYTE [DISTWIDTH] IN BLOOD BY AUTOMATED COUNT: 15 %
FASTING STATUS PATIENT QL REPORTED: NO
GLOBULIN PLAS-MCNC: 3.7 G/DL (ref 2.8–4.4)
GLUCOSE BLD-MCNC: 89 MG/DL (ref 70–99)
HCT VFR BLD AUTO: 38.7 %
HGB BLD-MCNC: 12.6 G/DL
IMM GRANULOCYTES # BLD AUTO: 0.04 X10(3) UL (ref 0–1)
IMM GRANULOCYTES NFR BLD: 0.5 %
LYMPHOCYTES # BLD AUTO: 1.83 X10(3) UL (ref 1–4)
LYMPHOCYTES NFR BLD AUTO: 23.1 %
MCH RBC QN AUTO: 28.6 PG (ref 26–34)
MCHC RBC AUTO-ENTMCNC: 32.6 G/DL (ref 31–37)
MCV RBC AUTO: 88 FL
MONOCYTES # BLD AUTO: 0.62 X10(3) UL (ref 0.1–1)
MONOCYTES NFR BLD AUTO: 7.8 %
NEUTROPHILS # BLD AUTO: 4.64 X10 (3) UL (ref 1.5–7.7)
NEUTROPHILS # BLD AUTO: 4.64 X10(3) UL (ref 1.5–7.7)
NEUTROPHILS NFR BLD AUTO: 58.5 %
OSMOLALITY SERPL CALC.SUM OF ELEC: 286 MOSM/KG (ref 275–295)
PLATELET # BLD AUTO: 309 10(3)UL (ref 150–450)
POTASSIUM SERPL-SCNC: 3.6 MMOL/L (ref 3.5–5.1)
PROT SERPL-MCNC: 7.4 G/DL (ref 6.4–8.2)
RBC # BLD AUTO: 4.4 X10(6)UL
SODIUM SERPL-SCNC: 139 MMOL/L (ref 136–145)
WBC # BLD AUTO: 7.9 X10(3) UL (ref 4–11)

## 2024-04-25 PROCEDURE — 96375 TX/PRO/DX INJ NEW DRUG ADDON: CPT

## 2024-04-25 PROCEDURE — 99215 OFFICE O/P EST HI 40 MIN: CPT | Performed by: NURSE PRACTITIONER

## 2024-04-25 PROCEDURE — 96413 CHEMO IV INFUSION 1 HR: CPT

## 2024-04-25 PROCEDURE — 96417 CHEMO IV INFUS EACH ADDL SEQ: CPT

## 2024-04-25 NOTE — PROGRESS NOTES
Pt here for C1D1 Drug(s)taxotere/cytoxan.  Arrives Ambulating independently, accompanied by Self     Patient was evaluated today by STEPHANI.    Oral medications included in this regimen:  yes - dexamethasone    Patient confirms comprehension of cancer treatment schedule:  yes    Pregnancy screening:  Denies possibility of pregnancy    Modifications in dose or schedule:  No    Medications appearance and physical integrity checked by RN: yes.    Chemotherapy IV pump settings verified by 2 RNs:  Yes.  Frequency of blood return and site check throughout administration: Prior to administration, Prior to each drug, and At completion of therapy     Infusion/treatment outcome:  hypersensitivity protocol initiated - see documentation  Patient  developed flushing and breathing. Subsided after infusion was stopped. Rechallenged at 1/4 rate for 15 min, then 1/2 rate for 15 min then full rate. Patient  tolerated remainder of infusion.   Education Record    Learner:  Patient  Barriers / Limitations:  None  Method:  Brief focused, Discussion, and Printed material  Education / instructions given:  schedule, antinausea meds, chemo SE  Outcome:  Shows understanding    Discharged Home, Ambulating independently, accompanied by:Self    Patient/family verbalized understanding of future appointments: by printed AVS

## 2024-04-25 NOTE — PROGRESS NOTES
Patient is here today for follow up with [facility] for Malignant neoplasm left breast. C1D1 Taxotere / Cytoxan. Patient denies pain. Did not take her Dexamethasone today.. was unsure how to take it. Medication list and medical history were reviewed and updated.     Education Record    Learner:  Patient      Disease / Diagnosis: Malignant neoplasm left breast    Barriers / Limitations:  None   Comments:    Method:  Brief focused, Discussion, Printed material and Reinforcement   Comments:    General Topics:  Medication, Pain, Procedure and Plan of care reviewed   Comments:    Outcome:  Shows understanding   Comments:    Post APN visit patient sent to waiting room. Treating RN notified.    AVS provided and follow up reviewed.  Patient instructed to call as needed.

## 2024-04-25 NOTE — PATIENT INSTRUCTIONS
[place] SCHEDULE:    1. You may take compazine (prochlorperazine) today around 4:00 PM.  2. You may take ondansetron (zofran) tonight at prior to bedtime.   3. Alternate between zofran and compazinie every 4 hours for the next 2-3 days.   For example: zofran at 6am, compazine 10am, zofran 2pm, compazine at 6pm, etc.  4. Then you may take antinausea medication as needed.      You do NOT need to wake yourself up in the middle of the night to take anti-nausea medications.    Zofran side effects: headaches and constipation.

## 2024-04-25 NOTE — PROGRESS NOTES
SW completed an assessment with pt to provide support and encouragement due to chemotherapy starting today. Chart reviewed and distress screening of 5 noted.    Pt lives in [place], [place] with her three children (Marlon 16, Zuhair, 15 and Adrianna 6).  Pt is  with a supportive ex-.  She reports a partner, Ed, who is supportive and involved. Pt states that her mother lives in [place] but is also a support person.  Her mother is her POAHC and she will bring in a copy of the form at her next treatment. SW discussed [place] for Adrianna and information given. SW also provided a Deborah to assist with hairloss.    Pt works full time as a  at [employer] in [place], [place].  She is currently on leave due to her recent double mastectomy. Social determinants of health assessment completed with pt and no needs identified at this time.    Pt presents with bright affect and mood. SW reviewed cancer support resources including [facility] and [facility]. SW provided a packet of resources including information on transportation resources, homecare/home health agencies, Facebook support group page and counseling resources. SW explained role of SW in Cancer Center and provided support as pt shared feelings and thoughts on cancer diagnosis.  SW contact information given. SW provided a BringingJoy bag which pt was very grateful for.    Coping skills reviewed and support services encouraged due to cancer dx.    Diana Lewis LCSW   at [address], [place] [address], [place] [id number] [address] [id number]  Ph: [phone] [email or web address]  Fax: [phone]

## 2024-04-25 NOTE — PROGRESS NOTES
Cancer Center Progress Note    Patient Name: Harleen Euceda   YOB: 1975   Medical Record Number: [id number]   CSN: [id number]   Date of visit: 2024     Chief Complaint/Reason for Visit:  Chief Complaint   Patient presents with    Chemotherapy     Follow up with Columba MENARD      History of Present Illness: Harleen presents today for follow up of breast cancer and to initiate adjuvant chemotherapy with docetaxel and cyclophosphamide. She was diagnosed with invasive lobular carcinoma of the left breast in 2024. She had a bilateral mastectomy (Dr. Proctor) on 3/22/2024, left breast showed invasive lobular carcinoma, grade 2 measuring 9 cm. Her medical oncologist is Dr. Deejay May.     Today, patient reports feeling well overall. She denies new pain or recent fever or chills. She denies new redness or drainage from mastectomy surgical incisions. She did not take oral dexamethasone pre-medication.     Problem List:  Patient Active Problem List   Diagnosis    Fibroadenoma of left breast    Varicose vein    Arthritis    Malignant neoplasm of upper-outer quadrant of left breast in female, estrogen receptor positive (HCC)    Esophageal obstruction due to food impaction    Hematoma of left breast    Absence of breast, bilateral    Malignant neoplasm of overlapping sites of left breast in female, estrogen receptor positive (HCC)        Medical History:  Past Medical History:    Anxiety state    Back problem    Breast cancer (HCC)    Esophageal obstruction due to food impaction    Fibroadenoma of left breast    Hx of motion sickness    PONV (postoperative nausea and vomiting)    with last  over sedated and o2 sat dropped    Varicose vein    Visual impairment       Surgical History:  Past Surgical History:   Procedure Laterality Date      ,2009,2018    Extraction erupted tooth/exr      wisdom teeth    Insert intrauterine device      Mirena insertion    Mastectomy left  2024     Mastectomy right  03/20/2024    Other surgical history Bilateral 01/22/2018    RLTCS with bilateral Salpingectomies    Remove intrauterine device  2015    Mirena removal    Skin surgery         Allergies:  Allergies   Allergen Reactions    Other SHORTNESS OF BREATH     Peonies and lemongrass.         Family History:  Family History   Problem Relation Age of Onset    Hypertension Father     Diabetes Father     Heart Disorder Father 52        quad bypass    Other (hypothyroid) Father     Breast Cancer Mother 46    Hypertension Mother     Psychiatric Mother         depression    Breast Cancer Maternal Grandmother 63    Other (Cholangiocarinoma) Brother 36       Social History:  Social History     Socioeconomic History    Marital status:      Spouse name: Not on file    Number of children: Not on file    Years of education: Not on file    Highest education level: Not on file   Occupational History    Not on file   Tobacco Use    Smoking status: Never    Smokeless tobacco: Never   Vaping Use    Vaping status: Never Used   Substance and Sexual Activity    Alcohol use: Yes     Alcohol/week: 6.0 standard drinks of alcohol     Types: 6 Standard drinks or equivalent per week    Drug use: No    Sexual activity: Not on file   Other Topics Concern     Service Not Asked    Blood Transfusions Not Asked    Caffeine Concern Yes     Comment: 3 cups of coffee daily    Occupational Exposure Not Asked    Hobby Hazards Not Asked    Sleep Concern Not Asked    Stress Concern Not Asked    Weight Concern Not Asked    Special Diet Not Asked    Back Care Not Asked    Exercise No    Bike Helmet Not Asked    Seat Belt Not Asked    Self-Exams Not Asked   Social History Narrative    Not on file     Social Determinants of Health     Financial Resource Strain: Not on file   Food Insecurity: No Food Insecurity (2/13/2024)    Food Insecurity     Food Insecurity: Never true   Transportation Needs: No Transportation Needs (2/13/2024)     Transportation Needs     Lack of Transportation: No   Physical Activity: Not on file   Stress: Not on file   Social Connections: Not on file   Housing Stability: Low Risk  (2/13/2024)    Housing Stability     Housing Instability: No     Housing Instability Emergency: Not on file       Medications:    Current Outpatient Medications:     ALPRAZolam 0.25 MG Oral Tab, Take 1 tablet (0.25 mg total) by mouth 2 (two) times daily as needed., Disp: 30 tablet, Rfl: 1    prochlorperazine (COMPAZINE) 10 mg tablet, Take 1 tablet (10 mg total) by mouth every 6 (six) hours as needed for Nausea. (Patient not taking: Reported on 4/25/2024), Disp: 30 tablet, Rfl: 3    ondansetron (ZOFRAN) 8 MG tablet, Take 1 tablet (8 mg total) by mouth every 8 (eight) hours as needed for Nausea. (Patient not taking: Reported on 4/25/2024), Disp: 30 tablet, Rfl: 3    dexamethasone (DECADRON) 4 MG tablet, Take 2 tablets (8 mg total) by mouth 2 (two) times daily with meals. (Patient not taking: Reported on 4/25/2024), Disp: 24 tablet, Rfl: 4    docusate sodium 100 MG Oral Cap, Take 1 capsule (100 mg total) by mouth 2 (two) times daily. (Patient not taking: Reported on 4/2/2024), Disp: 30 capsule, Rfl: 1    Review of Systems:  A comprehensive 14 point review of systems was completed.  Pertinent positives and negatives noted in the HPI.    Performance Status: ECOG 0    Physical Examination:  General: Patient is alert and oriented x 3, not in acute distress.  Vital Signs: Height: 157.1 cm (5' 1.85\") (04/25 0915)  Weight: 72.6 kg (160 lb 1.6 oz) (04/25 0915)  BSA (Calculated - sq m): 1.74 sq meters (04/25 0915)  Pulse: 77 (04/25 1034)  BP: 150/90 (04/25 1034)  Temp: 98.6 °F (37 °C) (04/25 1034)  Do Not Use - Resp Rate: --  SpO2: 99 % (04/25 1034)  HEENT: Anicteric, conjunctivae and sclerae clear, no oropharyngeal lesion/thrush, mucous membranes are moist   Chest: Clear to auscultation. Respirations unlabored.   Heart: Regular rate and rhythm.   Abdomen:  Soft, non-distended, non-tender with present bowel sounds.  Extremities: No edema.  Neurological: Grossly intact.   Lymphatics: There is no palpable lymphadenopathy throughout in the cervical or supraclavicular regions.   Skin: warm, dry, no erythema or rash   Psych/Depression: mood and affect are appropriate.     Labs:     Recent Results (from the past 72 hour(s))   Comp Metabolic Panel (14)    Collection Time: 04/25/24  9:02 AM   Result Value Ref Range    Glucose 89 70 - 99 mg/dL    Sodium 139 136 - 145 mmol/L    Potassium 3.6 3.5 - 5.1 mmol/L    Chloride 108 98 - 112 mmol/L    CO2 23.0 21.0 - 32.0 mmol/L    Anion Gap 8 0 - 18 mmol/L    BUN 8 (L) 9 - 23 mg/dL    Creatinine 1.12 (H) 0.55 - 1.02 mg/dL    Calcium, Total 9.3 8.5 - 10.1 mg/dL    Calculated Osmolality 286 275 - 295 mOsm/kg    eGFR-Cr 61 >=60 mL/min/1.73m2    AST 17 15 - 37 U/L    ALT 29 13 - 56 U/L    Alkaline Phosphatase 81 39 - 100 U/L    Bilirubin, Total 0.6 0.1 - 2.0 mg/dL    Total Protein 7.4 6.4 - 8.2 g/dL    Albumin 3.7 3.4 - 5.0 g/dL    Globulin  3.7 2.8 - 4.4 g/dL    A/G Ratio 1.0 1.0 - 2.0    Patient Fasting for CMP? No    CBC W/ DIFFERENTIAL    Collection Time: 04/25/24  9:02 AM   Result Value Ref Range    WBC 7.9 4.0 - 11.0 x10(3) uL    RBC 4.40 3.80 - 5.30 x10(6)uL    HGB 12.6 12.0 - 16.0 g/dL    HCT 38.7 35.0 - 48.0 %    .0 150.0 - 450.0 10(3)uL    MCV 88.0 80.0 - 100.0 fL    MCH 28.6 26.0 - 34.0 pg    MCHC 32.6 31.0 - 37.0 g/dL    RDW 15.0 %    Neutrophil Absolute Prelim 4.64 1.50 - 7.70 x10 (3) uL    Neutrophil Absolute 4.64 1.50 - 7.70 x10(3) uL    Lymphocyte Absolute 1.83 1.00 - 4.00 x10(3) uL    Monocyte Absolute 0.62 0.10 - 1.00 x10(3) uL    Eosinophil Absolute 0.73 (H) 0.00 - 0.70 x10(3) uL    Basophil Absolute 0.07 0.00 - 0.20 x10(3) uL    Immature Granulocyte Absolute 0.04 0.00 - 1.00 x10(3) uL    Neutrophil % 58.5 %    Lymphocyte % 23.1 %    Monocyte % 7.8 %    Eosinophil % 9.2 %    Basophil % 0.9 %    Immature Granulocyte  % 0.5 %       Impression/Plan    Left breast cancer: diagnosed with invasive lobular carcinoma of the left breast in 1/2024. She had a bilateral mastectomy (Dr. Proctor) on 3/22/2024, left breast showed invasive lobular carcinoma, grade 2 measuring 9 cm. Labs reviewed, proceed with cycle 1 docetaxel and cyclophosphamide today.     Hypersensitivity reaction: grade 1 consisting of facial flushing and uncomfortable breathing sensation, docetaxel infusion was stopped and restarted at slower rate. Patient tolerated the remainder of infusion without issue. Reviewed dexamethasone pre-medication instructions with patient.     Planned Follow Up: 1 week APN day 8 with labs; 2 weeks CBC; 3 weeks follow up with Dr. May, labs and chemo    Risk Level: HIGH breast cancer  receiving chemotherapy requiring close monitoring     The 21st Century Cures Act makes medical notes like these available to patients in the interest of transparency. Please be advised this is a medical document. Medical documents are intended to carry relevant information, facts as evident, and the clinical opinion of the practitioner. The medical note is intended as peer to peer communication and may appear blunt or direct. It is written in medical language and may contain abbreviations or verbiage that are unfamiliar.     Electronically Signed by:    Columba Encarnacion, RAI, APRN, NP-C, AOCNP  Nurse Practitioner  [facility]

## 2024-04-26 ENCOUNTER — TELEPHONE (OUTPATIENT)
Dept: HEMATOLOGY/ONCOLOGY | Facility: HOSPITAL | Age: 49
End: 2024-04-26

## 2024-04-26 ENCOUNTER — OFFICE VISIT (OUTPATIENT)
Dept: SURGERY | Facility: CLINIC | Age: 49
End: 2024-04-26
Payer: COMMERCIAL

## 2024-04-26 ENCOUNTER — TELEPHONE (OUTPATIENT)
Dept: HEMATOLOGY/ONCOLOGY | Age: 49
End: 2024-04-26

## 2024-04-26 DIAGNOSIS — Z90.13 ABSENCE OF BREAST, BILATERAL: Primary | ICD-10-CM

## 2024-04-26 PROCEDURE — 99024 POSTOP FOLLOW-UP VISIT: CPT | Performed by: SURGERY

## 2024-04-26 NOTE — TELEPHONE ENCOUNTER
Called patient post reaction yesterday.  No complaints or issues, slept fairly well for her.  Reinforced plan and to call for any issues.

## 2024-04-26 NOTE — TELEPHONE ENCOUNTER
Harleen is calling to ask about the 2 anti nausea medication the compazine is a 30 tablets and the zofran she only received 6 tablets to take every 4 hrs for the next 2 to 3 days and she need more medication for the weekend. Called 4/26/24

## 2024-04-26 NOTE — PROGRESS NOTES
Harleen Euceda is a 48 year old female who presents today for a follow-up.  She received her first hemotherapy yesterday.  She noticed a area of separation of her right breast incision and presents today for evaluation.  She denies fever and chills.     Physical Examination:  Breasts: The left breast incisions are clean dry intact.  The right breast is noted to have a superficial ulceration at the T-junction measuring less than 1 cm without surrounding erythema.    Assessment and Plan:  The patient was instructed to continue local wound care with topical antibiotic ointment.  She will follow-up in 6 weeks for scar check.  The plan was reviewed with the patient and questions were answered.

## 2024-04-26 NOTE — TELEPHONE ENCOUNTER
Received call from patient regarding her zofran prescription. Patient stated that she received 6 tablets of zofran from her pharmacy. I reassured her that the prescription states 30 qty and that she should call her pharmacy to find out if they ran out or what had happened. Patient conveyed understanding. Patient states she was going to call pharmacy and keep us updated.

## 2024-04-29 NOTE — PROGRESS NOTES
[facility]  Progress Note    Harleen Euceda Patient Status:  No patient class for patient encounter    1975 MRN [id number]   Location [facility] Attending No att. providers found   Hosp Day # 0 PCP Ezra Jones MD     Subjective: Patient status post bilateral mastectomy with left sentinel lymph node biopsy.  Final pathology demonstrated a 9 cm left breast tumor with negative lymph nodes.  Patient underwent closure  per plastics, Dr. langford        Objective/Physical Exam:    [unfilled]    General: Alert, orientated x3.  Cooperative.  No apparent distress.  Vital Signs:  Pulse 58, temperature 97.2 °F (36.2 °C), temperature source Temporal, last menstrual period 2024, not currently breastfeeding.  HEENT: Exam is unremarkable.  Without scleral icterus.  Mucous membranes are moist. Oropharynx is clear.  Neck: No tenderness to palpitation.  No JVD. Supple.   Lungs: Clear to auscultation bilaterally.  Cardiac: Regular rate and rhythm. No murmur.  Abdomen:  Soft, non-distended, non-tender, with no rebound or guarding.  No peritoneal signs. No ascites.  Liver is within normal limits.  Spleen is not palpable   Extremities:  No lower extremity edema noted.  Without clubbing or cyanosis.    Chest demonstrates bilateral mastectomy sites healing well no evidence of erythema or infection.  Patient did require return to the OR for a hematoma underneath the breast flaps.  This was performed with Dr. Mendenhall.    Labs:             Assessment/Plan:  Patient Active Problem List   Diagnosis    Fibroadenoma of left breast    Varicose vein    Arthritis    Malignant neoplasm of upper-outer quadrant of left breast in female, estrogen receptor positive (HCC)    Esophageal obstruction due to food impaction    Hematoma of left breast    Absence of breast, bilateral    Malignant neoplasm of overlapping sites of left breast in female, estrogen receptor positive (HCC)       Impression:  Status post bilateral mastectomy with sentinel lymph node biopsy and return to the OR for a hematoma underneath the breast flaps.  Patient is doing well.  Will turn over her care to oncology at this time and wound care to plastics.  I will see her back in 1 years time.  I spent  33  minutes face to face with the patient. More than 50% of that time was spent counseling the patient and/or on coordination of care. The diagnosis, prognosis, and general treatment was explained to the patient and the family.        Joseph Proctor DO  [facility]  4/29/2024  10:35 AM

## 2024-05-03 ENCOUNTER — OFFICE VISIT (OUTPATIENT)
Dept: HEMATOLOGY/ONCOLOGY | Age: 49
End: 2024-05-03
Attending: SURGERY
Payer: COMMERCIAL

## 2024-05-03 VITALS
DIASTOLIC BLOOD PRESSURE: 90 MMHG | TEMPERATURE: 97 F | OXYGEN SATURATION: 100 % | BODY MASS INDEX: 30 KG/M2 | RESPIRATION RATE: 16 BRPM | HEIGHT: 61.85 IN | HEART RATE: 83 BPM | WEIGHT: 163 LBS | SYSTOLIC BLOOD PRESSURE: 143 MMHG

## 2024-05-03 DIAGNOSIS — C50.812 MALIGNANT NEOPLASM OF OVERLAPPING SITES OF LEFT BREAST IN FEMALE, ESTROGEN RECEPTOR POSITIVE (HCC): ICD-10-CM

## 2024-05-03 DIAGNOSIS — Z17.0 MALIGNANT NEOPLASM OF OVERLAPPING SITES OF LEFT BREAST IN FEMALE, ESTROGEN RECEPTOR POSITIVE (HCC): ICD-10-CM

## 2024-05-03 DIAGNOSIS — D70.1 CHEMOTHERAPY INDUCED NEUTROPENIA (HCC): Primary | ICD-10-CM

## 2024-05-03 DIAGNOSIS — T45.1X5A CHEMOTHERAPY INDUCED NEUTROPENIA (HCC): Primary | ICD-10-CM

## 2024-05-03 LAB
BASOPHILS # BLD AUTO: 0.03 X10(3) UL (ref 0–0.2)
BASOPHILS NFR BLD AUTO: 2.4 %
EOSINOPHIL # BLD AUTO: 0.01 X10(3) UL (ref 0–0.7)
EOSINOPHIL NFR BLD AUTO: 0.8 %
ERYTHROCYTE [DISTWIDTH] IN BLOOD BY AUTOMATED COUNT: 15.1 %
HCT VFR BLD AUTO: 32.7 %
HGB BLD-MCNC: 10.6 G/DL
IMM GRANULOCYTES # BLD AUTO: 0 X10(3) UL (ref 0–1)
IMM GRANULOCYTES NFR BLD: 0 %
LYMPHOCYTES # BLD AUTO: 0.86 X10(3) UL (ref 1–4)
LYMPHOCYTES NFR BLD AUTO: 67.7 %
MCH RBC QN AUTO: 28.5 PG (ref 26–34)
MCHC RBC AUTO-ENTMCNC: 32.4 G/DL (ref 31–37)
MCV RBC AUTO: 87.9 FL
MONOCYTES # BLD AUTO: 0.2 X10(3) UL (ref 0.1–1)
MONOCYTES NFR BLD AUTO: 15.7 %
NEUTROPHILS # BLD AUTO: 0.17 X10 (3) UL (ref 1.5–7.7)
NEUTROPHILS # BLD AUTO: 0.17 X10(3) UL (ref 1.5–7.7)
NEUTROPHILS NFR BLD AUTO: 13.4 %
PLATELET # BLD AUTO: 358 10(3)UL (ref 150–450)
PLATELET MORPHOLOGY: NORMAL
RBC # BLD AUTO: 3.72 X10(6)UL
WBC # BLD AUTO: 1.3 X10(3) UL (ref 4–11)

## 2024-05-03 PROCEDURE — 99215 OFFICE O/P EST HI 40 MIN: CPT | Performed by: NURSE PRACTITIONER

## 2024-05-03 NOTE — PROGRESS NOTES
Cancer Center Progress Note    Patient Name: Harleen Euceda   YOB: 1975   Medical Record Number: [id number]    Date of visit: 5/3/2024    Chief Complaint/Reason for Visit:  Chief Complaint   Patient presents with    Follow - Up      History of Present Illness: Harleen presents today for follow up of breast cancer. Last week she started adjuvant chemotherapy with docetaxel and cyclophosphamide. She was diagnosed with invasive lobular carcinoma of the left breast in 1/2024. She had a bilateral mastectomy (Dr. Proctor) on 3/22/2024, left breast showed invasive lobular carcinoma, grade 2 measuring 9 cm. Her medical oncologist is Dr. Deejay May.     She had a mild grade 1 infusion reaction last week consisting of facial flushing and uncomfortable breathing sensation, docetaxel infusion was stopped and restarted at slower rate. She was able to tolerate the remainder of infusion without issue. This may have occurred due to patient missing the oral dexamethasone pre-medication.     Today, patient reports feeling well overall. She took prophylactic antiemetics for 3 days and did not experience vomiting. She had mild nausea that was improved with eating food. She reports that on day 3, she had moderate fatigue. She denies new rashes, fever or chills, sore throat or cough.     Problem List:  Patient Active Problem List   Diagnosis    Fibroadenoma of left breast    Varicose vein    Arthritis    Malignant neoplasm of upper-outer quadrant of left breast in female, estrogen receptor positive (HCC)    Esophageal obstruction due to food impaction    Hematoma of left breast    Absence of breast, bilateral    Malignant neoplasm of overlapping sites of left breast in female, estrogen receptor positive (HCC)        Medical History:  Past Medical History:    Anxiety state    Back problem    Breast cancer (HCC)    Esophageal obstruction due to food impaction    Fibroadenoma of left breast    Hx of motion sickness    PONV  (postoperative nausea and vomiting)    with last  over sedated and o2 sat dropped    Varicose vein    Visual impairment       Surgical History:  Past Surgical History:   Procedure Laterality Date      ,,    Extraction erupted tooth/exr      wisdom teeth    Insert intrauterine device      Mirena insertion    Mastectomy left  2024    Mastectomy right  2024    Other surgical history Bilateral 2018    RLTCS with bilateral Salpingectomies    Remove intrauterine device      Mirena removal    Skin surgery         Allergies:  Allergies   Allergen Reactions    Other SHORTNESS OF BREATH     Peonies and lemongrass.         Family History:  Family History   Problem Relation Age of Onset    Hypertension Father     Diabetes Father     Heart Disorder Father 52        quad bypass    Other (hypothyroid) Father     Breast Cancer Mother 46    Hypertension Mother     Psychiatric Mother         depression    Breast Cancer Maternal Grandmother 63    Other (Cholangiocarinoma) Brother 36       Social History:  Social History     Socioeconomic History    Marital status:      Spouse name: Not on file    Number of children: Not on file    Years of education: Not on file    Highest education level: Not on file   Occupational History    Not on file   Tobacco Use    Smoking status: Never    Smokeless tobacco: Never   Vaping Use    Vaping status: Never Used   Substance and Sexual Activity    Alcohol use: Yes     Alcohol/week: 6.0 standard drinks of alcohol     Types: 6 Standard drinks or equivalent per week    Drug use: No    Sexual activity: Not on file   Other Topics Concern     Service Not Asked    Blood Transfusions Not Asked    Caffeine Concern Yes     Comment: 3 cups of coffee daily    Occupational Exposure Not Asked    Hobby Hazards Not Asked    Sleep Concern Not Asked    Stress Concern Not Asked    Weight Concern Not Asked    Special Diet Not Asked    Back Care Not  Asked    Exercise No    Bike Helmet Not Asked    Seat Belt Not Asked    Self-Exams Not Asked   Social History Narrative    Not on file     Social Determinants of Health     Financial Resource Strain: Low Risk  (4/25/2024)    Financial Resource Strain     Difficulty of Paying Living Expenses: Not hard at all     Med Affordability: No   Food Insecurity: No Food Insecurity (4/25/2024)    Food Insecurity     Food Insecurity: Never true   Transportation Needs: No Transportation Needs (4/25/2024)    Transportation Needs     Lack of Transportation: No   Physical Activity: Not on file   Stress: No Stress Concern Present (4/25/2024)    Stress     Feeling of Stress : No   Social Connections: Socially Integrated (4/25/2024)    Social Connections     Frequency of Socialization with Friends and Family: 3   Housing Stability: Low Risk  (4/25/2024)    Housing Stability     Housing Instability: No     Housing Instability Emergency: Not on file       Medications:    Current Outpatient Medications:     prochlorperazine (COMPAZINE) 10 mg tablet, Take 1 tablet (10 mg total) by mouth every 6 (six) hours as needed for Nausea. (Patient not taking: Reported on 4/25/2024), Disp: 30 tablet, Rfl: 3    ondansetron (ZOFRAN) 8 MG tablet, Take 1 tablet (8 mg total) by mouth every 8 (eight) hours as needed for Nausea. (Patient not taking: Reported on 4/25/2024), Disp: 30 tablet, Rfl: 3    dexamethasone (DECADRON) 4 MG tablet, Take 2 tablets (8 mg total) by mouth 2 (two) times daily with meals. (Patient not taking: Reported on 4/25/2024), Disp: 24 tablet, Rfl: 4    docusate sodium 100 MG Oral Cap, Take 1 capsule (100 mg total) by mouth 2 (two) times daily. (Patient not taking: Reported on 4/2/2024), Disp: 30 capsule, Rfl: 1    ALPRAZolam 0.25 MG Oral Tab, Take 1 tablet (0.25 mg total) by mouth 2 (two) times daily as needed., Disp: 30 tablet, Rfl: 1    Review of Systems:  A comprehensive 14 point review of systems was completed.  Pertinent positives  and negatives noted in the HPI.    Performance Status: ECOG 0    Physical Examination:  General: Patient is alert and oriented x 3, not in acute distress.  Vital Signs: Height: 157.1 cm (5' 1.85\") (05/03 0842)  Weight: 73.9 kg (163 lb) (05/03 0842)  BSA (Calculated - sq m): 1.75 sq meters (05/03 0842)  Pulse: 83 (05/03 0842)  BP: 143/90 (05/03 0842)  Temp: 97.3 °F (36.3 °C) (05/03 0842)  Do Not Use - Resp Rate: --  SpO2: 100 % (05/03 0842)  HEENT: Anicteric, conjunctivae and sclerae clear, no oropharyngeal lesion/thrush, mucous membranes are moist   Chest: Clear to auscultation. Respirations unlabored.   Heart: Regular rate and rhythm.   Abdomen: Soft, non-distended, non-tender with present bowel sounds.  Extremities: No edema.  Neurological: Grossly intact.   Lymphatics: There is no palpable lymphadenopathy throughout in the cervical or supraclavicular regions.   Skin: warm, dry, no erythema or rash   Psych/Depression: mood and affect are appropriate.     Labs:     Recent Results (from the past 72 hour(s))   CBC W/ DIFFERENTIAL    Collection Time: 05/03/24  8:33 AM   Result Value Ref Range    WBC 1.3 (L) 4.0 - 11.0 x10(3) uL    RBC 3.72 (L) 3.80 - 5.30 x10(6)uL    HGB 10.6 (L) 12.0 - 16.0 g/dL    HCT 32.7 (L) 35.0 - 48.0 %    .0 150.0 - 450.0 10(3)uL    MCV 87.9 80.0 - 100.0 fL    MCH 28.5 26.0 - 34.0 pg    MCHC 32.4 31.0 - 37.0 g/dL    RDW 15.1 %    Neutrophil Absolute Prelim 0.17 (LL) 1.50 - 7.70 x10 (3) uL    Neutrophil Absolute 0.17 (LL) 1.50 - 7.70 x10(3) uL    Lymphocyte Absolute 0.86 (L) 1.00 - 4.00 x10(3) uL    Monocyte Absolute 0.20 0.10 - 1.00 x10(3) uL    Eosinophil Absolute 0.01 0.00 - 0.70 x10(3) uL    Basophil Absolute 0.03 0.00 - 0.20 x10(3) uL    Immature Granulocyte Absolute 0.00 0.00 - 1.00 x10(3) uL    Neutrophil % 13.4 %    Lymphocyte % 67.7 %    Monocyte % 15.7 %    Eosinophil % 0.8 %    Basophil % 2.4 %    Immature Granulocyte % 0.0 %   Scan slide    Collection Time: 05/03/24  8:33 AM    Result Value Ref Range    Slide Review Slide reviewed,morphology review added    RBC Morphology Scan    Collection Time: 05/03/24  8:33 AM   Result Value Ref Range    RBC Morphology See morphology below (A) Normal, Slide reviewed, see previous RBC morphology.    Platelet Morphology Normal Normal    Microcytosis 1+      Macrocytosis 1+      Tear Drop Cells 1+      Ovalocytes 1+         Impression/Plan    Left breast cancer: diagnosed with invasive lobular carcinoma of the left breast in 1/2024. She had a bilateral mastectomy (Dr. Proctor) on 3/22/2024, left breast showed invasive lobular carcinoma, grade 2 measuring 9 cm.  Last week on 4/25/2024 she started adjuvant docetaxel and cyclophosphamide. Tolerated with expected side effects. Reviewed trajectory and anticipated recovery of side effects.     Neutropenia: afebrile, related to chemotherapy. Repeat CBC in 1 week. If prolonged neutropenia, may require GCSF in future cycles.     Planned Follow Up: 1 week CBC; 2 weeks follow up with Dr. May, labs and chemo    Risk Level: HIGH breast cancer  receiving chemotherapy requiring close monitoring     The 21st Century Cures Act makes medical notes like these available to patients in the interest of transparency. Please be advised this is a medical document. Medical documents are intended to carry relevant information, facts as evident, and the clinical opinion of the practitioner. The medical note is intended as peer to peer communication and may appear blunt or direct. It is written in medical language and may contain abbreviations or verbiage that are unfamiliar.     Electronically Signed by:    Columba Encarnacion, RAI, APRN, NP-C, AOCNP  Nurse Practitioner  [facility]

## 2024-05-10 ENCOUNTER — NURSE ONLY (OUTPATIENT)
Dept: HEMATOLOGY/ONCOLOGY | Age: 49
End: 2024-05-10
Attending: SURGERY
Payer: COMMERCIAL

## 2024-05-10 DIAGNOSIS — C50.812 MALIGNANT NEOPLASM OF OVERLAPPING SITES OF LEFT BREAST IN FEMALE, ESTROGEN RECEPTOR POSITIVE (HCC): ICD-10-CM

## 2024-05-10 DIAGNOSIS — Z17.0 MALIGNANT NEOPLASM OF OVERLAPPING SITES OF LEFT BREAST IN FEMALE, ESTROGEN RECEPTOR POSITIVE (HCC): ICD-10-CM

## 2024-05-10 LAB
BASOPHILS # BLD: 0 X10(3) UL (ref 0–0.2)
BASOPHILS NFR BLD: 0 %
EOSINOPHIL # BLD: 0.08 X10(3) UL (ref 0–0.7)
EOSINOPHIL NFR BLD: 1 %
ERYTHROCYTE [DISTWIDTH] IN BLOOD BY AUTOMATED COUNT: 15 %
HCT VFR BLD AUTO: 38.4 %
HGB BLD-MCNC: 12.5 G/DL
LYMPHOCYTES NFR BLD: 2.32 X10(3) UL (ref 1–4)
LYMPHOCYTES NFR BLD: 29 %
MCH RBC QN AUTO: 28.2 PG (ref 26–34)
MCHC RBC AUTO-ENTMCNC: 32.6 G/DL (ref 31–37)
MCV RBC AUTO: 86.7 FL
MONOCYTES # BLD: 1.04 X10(3) UL (ref 0.1–1)
MONOCYTES NFR BLD: 13 %
NEUTROPHILS # BLD AUTO: 4.72 X10 (3) UL (ref 1.5–7.7)
NEUTROPHILS NFR BLD: 52 %
NEUTS BAND NFR BLD: 5 %
NEUTS HYPERSEG # BLD: 4.56 X10(3) UL (ref 1.5–7.7)
PLATELET # BLD AUTO: 371 10(3)UL (ref 150–450)
PLATELET MORPHOLOGY: NORMAL
RBC # BLD AUTO: 4.43 X10(6)UL
TOTAL CELLS COUNTED BLD: 100
WBC # BLD AUTO: 8 X10(3) UL (ref 4–11)

## 2024-05-10 PROCEDURE — 36415 COLL VENOUS BLD VENIPUNCTURE: CPT

## 2024-05-10 PROCEDURE — 85027 COMPLETE CBC AUTOMATED: CPT

## 2024-05-10 PROCEDURE — 85025 COMPLETE CBC W/AUTO DIFF WBC: CPT

## 2024-05-10 PROCEDURE — 85007 BL SMEAR W/DIFF WBC COUNT: CPT

## 2024-05-17 ENCOUNTER — OFFICE VISIT (OUTPATIENT)
Dept: HEMATOLOGY/ONCOLOGY | Age: 49
End: 2024-05-17
Attending: SURGERY

## 2024-05-17 VITALS
DIASTOLIC BLOOD PRESSURE: 81 MMHG | HEART RATE: 89 BPM | TEMPERATURE: 97 F | RESPIRATION RATE: 148 BRPM | WEIGHT: 164.5 LBS | BODY MASS INDEX: 30.27 KG/M2 | SYSTOLIC BLOOD PRESSURE: 139 MMHG | OXYGEN SATURATION: 99 % | HEIGHT: 61.85 IN

## 2024-05-17 DIAGNOSIS — Z17.0 MALIGNANT NEOPLASM OF OVERLAPPING SITES OF LEFT BREAST IN FEMALE, ESTROGEN RECEPTOR POSITIVE (HCC): Primary | ICD-10-CM

## 2024-05-17 DIAGNOSIS — C50.812 MALIGNANT NEOPLASM OF OVERLAPPING SITES OF LEFT BREAST IN FEMALE, ESTROGEN RECEPTOR POSITIVE (HCC): Primary | ICD-10-CM

## 2024-05-17 LAB
ALBUMIN SERPL-MCNC: 3.7 G/DL (ref 3.4–5)
ALBUMIN/GLOB SERPL: 1 {RATIO} (ref 1–2)
ALP LIVER SERPL-CCNC: 80 U/L
ALT SERPL-CCNC: 22 U/L
ANION GAP SERPL CALC-SCNC: 8 MMOL/L (ref 0–18)
AST SERPL-CCNC: 16 U/L (ref 15–37)
BASOPHILS # BLD AUTO: 0.07 X10(3) UL (ref 0–0.2)
BASOPHILS NFR BLD AUTO: 0.3 %
BILIRUB SERPL-MCNC: 0.2 MG/DL (ref 0.1–2)
BUN BLD-MCNC: 11 MG/DL (ref 9–23)
CALCIUM BLD-MCNC: 9.4 MG/DL (ref 8.5–10.1)
CHLORIDE SERPL-SCNC: 110 MMOL/L (ref 98–112)
CO2 SERPL-SCNC: 22 MMOL/L (ref 21–32)
CREAT BLD-MCNC: 0.99 MG/DL
EGFRCR SERPLBLD CKD-EPI 2021: 71 ML/MIN/1.73M2 (ref 60–?)
EOSINOPHIL # BLD AUTO: 0.01 X10(3) UL (ref 0–0.7)
EOSINOPHIL NFR BLD AUTO: 0 %
ERYTHROCYTE [DISTWIDTH] IN BLOOD BY AUTOMATED COUNT: 15.9 %
GLOBULIN PLAS-MCNC: 3.7 G/DL (ref 2.8–4.4)
GLUCOSE BLD-MCNC: 164 MG/DL (ref 70–99)
HCT VFR BLD AUTO: 37.5 %
HGB BLD-MCNC: 12.3 G/DL
IMM GRANULOCYTES # BLD AUTO: 0.26 X10(3) UL (ref 0–1)
IMM GRANULOCYTES NFR BLD: 1 %
LYMPHOCYTES # BLD AUTO: 1.38 X10(3) UL (ref 1–4)
LYMPHOCYTES NFR BLD AUTO: 5.4 %
MCH RBC QN AUTO: 28.4 PG (ref 26–34)
MCHC RBC AUTO-ENTMCNC: 32.8 G/DL (ref 31–37)
MCV RBC AUTO: 86.6 FL
MONOCYTES # BLD AUTO: 0.92 X10(3) UL (ref 0.1–1)
MONOCYTES NFR BLD AUTO: 3.6 %
NEUTROPHILS # BLD AUTO: 23.06 X10 (3) UL (ref 1.5–7.7)
NEUTROPHILS # BLD AUTO: 23.06 X10(3) UL (ref 1.5–7.7)
NEUTROPHILS NFR BLD AUTO: 89.7 %
OSMOLALITY SERPL CALC.SUM OF ELEC: 293 MOSM/KG (ref 275–295)
PLATELET # BLD AUTO: 416 10(3)UL (ref 150–450)
POTASSIUM SERPL-SCNC: 3.7 MMOL/L (ref 3.5–5.1)
PROT SERPL-MCNC: 7.4 G/DL (ref 6.4–8.2)
RBC # BLD AUTO: 4.33 X10(6)UL
SODIUM SERPL-SCNC: 140 MMOL/L (ref 136–145)
WBC # BLD AUTO: 25.7 X10(3) UL (ref 4–11)

## 2024-05-17 PROCEDURE — 96375 TX/PRO/DX INJ NEW DRUG ADDON: CPT

## 2024-05-17 PROCEDURE — 96417 CHEMO IV INFUS EACH ADDL SEQ: CPT

## 2024-05-17 PROCEDURE — 99215 OFFICE O/P EST HI 40 MIN: CPT | Performed by: INTERNAL MEDICINE

## 2024-05-17 PROCEDURE — 96413 CHEMO IV INFUSION 1 HR: CPT

## 2024-05-17 NOTE — PROGRESS NOTES
Cancer Center Progress Note    Problem List:      Patient Active Problem List   Diagnosis    Fibroadenoma of left breast    Varicose vein    Arthritis    Malignant neoplasm of upper-outer quadrant of left breast in female, estrogen receptor positive (HCC)    Esophageal obstruction due to food impaction    Hematoma of left breast    Absence of breast, bilateral    Malignant neoplasm of overlapping sites of left breast in female, estrogen receptor positive (HCC)       Interim History:    Harleen Euceda presents today for evaluation and management of a diagnosis of left breast cancer.    Since last visit she had a first cycle of TC chemotherapy. She had increased fatigue with this cycle. She still is working. She did use zofran but had no nausea. She took this for about 3 days. She did have constipation. She took stool softener and mag citrate with benefit. She had mild oral soreness that has resolved. She had some alteration of her taste.    She had bilateral mastectomy by Dr. Proctor. The right breast was benign. The left breast had a 90 mm area of invasive lobular carcinoma. The margins were negative. The SLN procedure had 0/3 nodes.She had a negative metastatic work up with CT of the CAP and bone scan.     She initially had a screening mammogram on 11/27/2023 that showed asymmetry in the left breast. She had diagnostic mammogram and US on 12/11/2023 that showed a 5 x 4 x 5 mm nodule. She had US biopsy on 12/22/2023 that showed invasive lobular carcinoma, grade II with %, %, Ki-67 12% and Her2 2+ IHC; FISH not amplified. She had an MRI of the breasts on 1/4/2024 that showed more extensive abnormality in the left breast with an 11 cm area of enhancement. There were no abnormal appearing axillary nodes.       Review of Systems:   Constitutional: Negative for anorexia, fatigue, fevers, chills, night sweats and weight loss.  Eyes: Negative for visual disturbance, irritation and redness.  Respiratory: Negative  for cough, hemoptysis, chest pain, or dyspnea.  Cardiovascular: Negative for angina, orthopnea or palpitations.  Gastrointestinal: Negative for nausea, vomiting, change in bowel habits, diarrhea, constipation and abdominal pain.  Integument/breast: Negative for rash, skin lesions, and pruritus.  Hematologic/lymphatic: Negative for easy bruising, bleeding, and lymphadenopathy.  Musculoskeletal: Negative for myalgias, arthralgias, muscle weakness.  Genitourinary: Negative for dysuria or hematuria  Neurological: Negative for headaches, dizziness, speech problems, gait problems and focal weakness.  Psychiatric: The patient's mood was calm and appropriate for this visit.  The pertinent positives and negatives were described. All other systems were negative.    PMH/PSH:  Past Medical History:    Anxiety state    Back problem    Breast cancer (HCC)    Esophageal obstruction due to food impaction    Fibroadenoma of left breast    Hx of motion sickness    PONV (postoperative nausea and vomiting)    with last  over sedated and o2 sat dropped    Varicose vein    Visual impairment       Past Surgical History:   Procedure Laterality Date      ,,    Extraction erupted tooth/exr      wisdom teeth    Insert intrauterine device      Mirena insertion    Mastectomy left  2024    Mastectomy right  2024    Other surgical history Bilateral 2018    RLTCS with bilateral Salpingectomies    Remove intrauterine device      Mirena removal    Skin surgery         Family History Reviewed:  Family History   Problem Relation Age of Onset    Hypertension Father     Diabetes Father     Heart Disorder Father 52        quad bypass    Other (hypothyroid) Father     Breast Cancer Mother 46    Hypertension Mother     Psychiatric Mother         depression    Breast Cancer Maternal Grandmother 63    Other (Cholangiocarinoma) Brother 36       Allergies:     Allergies   Allergen Reactions    Other  SHORTNESS OF BREATH     Peonies and lemongrass.         Medications:  No outpatient medications have been marked as taking for the 5/17/24 encounter (Office Visit) with Deejay May MD.         Vital Signs:      Height: 157.1 cm (5' 1.85\") (05/17 0909)  Weight: 74.6 kg (164 lb 8 oz) (05/17 0909)  BSA (Calculated - sq m): 1.76 sq meters (05/17 0909)  Pulse: 89 (05/17 0909)  BP: 139/81 (05/17 0909)  Temp: 97.3 °F (36.3 °C) (05/17 0909)  Do Not Use - Resp Rate: --  SpO2: 99 % (05/17 0909)      Performance Status:  ECOG 0: Fully active, able to carry on all pre-disease performance without restriction     Physical Examination:    Constitutional: Patient is alert and oriented x 3, not in acute distress.  Respiratory: Clear to auscultation and percussion. No rales.  No wheezes.  Cardiovascular: Regular rate and rhythm. No murmurs.  Gastrointestinal: Soft, non tender with good bowel sounds.  Musculoskeletal: No edema. No calf tenderness.  Skin: No suspicious skin lesion, no rash, no ulceration.  Lymphatics: There is no palpable lymphadenopathy throughout in the cervical, supraclavicular, or axillary regions.  Psychiatric: The patient's mood is calm and appropriate for this visit.       Labs reviewed at this visit:     Lab Results   Component Value Date    WBC 8.0 05/10/2024    RBC 4.43 05/10/2024    HGB 12.5 05/10/2024    HCT 38.4 05/10/2024    MCV 86.7 05/10/2024    MCH 28.2 05/10/2024    MCHC 32.6 05/10/2024    RDW 15.0 05/10/2024    .0 05/10/2024     Lab Results   Component Value Date     04/25/2024    K 3.6 04/25/2024     04/25/2024    CO2 23.0 04/25/2024    BUN 8 (L) 04/25/2024    CREATSERUM 1.12 (H) 04/25/2024    GLU 89 04/25/2024    CA 9.3 04/25/2024    ALKPHO 81 04/25/2024    ALT 29 04/25/2024    AST 17 04/25/2024    BILT 0.6 04/25/2024    ALB 3.7 04/25/2024    TP 7.4 04/25/2024     Pathology from 3/20/2024:  Final Diagnosis:   A.  Excision, left sentinel lymph node #1:  -1 lymph node (0/1),  negative for malignancy.     B.  Excision, left sentinel lymph node #2:  -1 lymph node (0/1), negative for malignancy.     C.  Excision, left sentinel lymph node #3:  -1 lymph node (0/1), negative for malignancy.     D.  Right mastectomy:  -Breast tissue with focal atypical lobular hyperplasia (ALH).  -Background breast tissue with fibrocystic changes including stromal fibrosis, usual ductal hyperplasia and cystic apocrine metaplasia.  -Unremarkable skin.  -Negative for malignancy.     E.  Left mastectomy:  -Invasive lobular carcinoma, grade 2, measuring approximately 90 mm (9 cm).        -Associated microcalcifications.  -Extensive lobular carcinoma in situ (LCIS).   -Invasive tumor is 3 mm (0.3 cm) from the closest deep margin of resection.  -No lymphovascular invasion is identified.   -Biopsy site changes are present.   -Unremarkable skin.   -Background breast tissue with fibrocystic changes including cystic apocrine metaplasia and fibroadenomatous changes.        Radiologic imaging reviewed at this visit:    Bone Scan on 1/15/2024:  FINDINGS:    IMAGED AREA:  Whole-body  ABNORMALITIES:  None.  OTHER:  Soft tissue nodule in the lateral aspect of the left breast is noted.  Degenerative changes in the shoulders and knees are noted.  There is scoliosis of the thoracolumbar spine..     Impression   CONCLUSION:    1. No evidence of osseous metastatic disease in the body.  2. Scoliosis of the thoracolumbar spine is noted.       CT CAP on 1/15/2024:  FINDINGS:       CHEST:    LUNGS:  No nodules or infiltrates.  No bronchiectasis.  MEDIASTINUM:  No adenopathy.  PENNY:  No adenopathy.  CARDIAC:  No pericardial effusion.  No significant coronary calcifications.  PLEURA:  No pleural effusion.  CHEST WALL:  There is a left axillary lymph node measuring 1.3 x 1.0 cm.  AORTA:  Normal caliber.  VASCULATURE:  Smooth tapering.     ABDOMEN/PELVIS:  LIVER:  Uniform parenchyma.  BILIARY:  Nondistended gallbladder.  No biliary  dilatation.  PANCREAS:  Uniform parenchyma.  No ductal dilatation.  SPLEEN:  Not enlarged.  KIDNEYS:  Normal anatomic positions.  No hydronephrosis or perinephric stranding.  Uniform parenchyma.  ADRENALS:  Not enlarged.  AORTA:  Smooth tapering.  Patent celiac artery, SMA and KRISTI.  Patent splenic vein and portal vein.  RETROPERITONEUM:  No adenopathy.  BOWEL/MESENTERY:  Normal bowel caliber.  Moderate stool in the colon.  No ascites.  ABDOMINAL WALL:  No hernia.  URINARY BLADDER:  Nondistended.  PELVIC NODES:  None enlarged.  PELVIC ORGANS:  No uterine or ovarian abnormality.  BONES:  Moderate to severe degenerative changes lower cervical spine and L4-L5 facet joints.  Milder changes elsewhere.  Moderate apex left thoracolumbar scoliosis.  Normal vertebral body heights.     Impression   CONCLUSION:    1. Borderline enlarged left axillary lymph node.  2. No additional metastatic findings in the chest, abdomen or pelvis.  3. Details as above.          Assessment/Plan:     Invasive lobular carcinoma of the overlapping quadrants of the left breast:  Clincial T3 disease with 11 cm of MRI abnormality  Clinical N0 disease  %  %  Ki-67 12%  Her2 2+ IHC  Her2 not amplied FISH  Bilateral Mastectomy on 3/20/2024:  Right breast benign  Left breast with 9 cm of ILC  SLN 0/3     The patient has a Luminal breast cancer with strong ER/NV positive and low grade and low Ki-67. She has a very large breast cancer.     She will continue with cycle 2 at the same doses. She had infusion reaction with the first cycle. She took dex 8 BID yesterday and 4 mg today. I will give her dex 20 mg IV pre chemo. If she does well then we will back off with the next cycles.    She will take zofran and compazine PRN. She will be more aggressive with mirralax for her constipation.    She had day 8 neutropenia. She will monitor for fever. If temp greater than 100.5 she will call. If temp greater than 101.5 she will come to the ED.      Deejay  ZION May MD

## 2024-05-17 NOTE — PROGRESS NOTES
Patient is here for follow up for breast cancer on C2 of taxotere and cytoxan.    She had a lot of constipation from zofran.  She uses stool softener when she is taking this.  She used some mag citrate and this was relieved.   She had a lot of changes to her mouth, soreness and a thick feeling.  She used biotene rinse and after about a week and then resolved.  She had some taste changes during this time.  She has been able to eat.   She had some fatigue but continued to work and do all her usual activities.     Education Record    Learner:  Patient    Disease / Diagnosis: Breast cancer    Barriers / Limitations:  None   Comments:    Method:  Discussion   Comments:    General Topics:  Side effects and symptom management   Comments:    Outcome:  Shows understanding   Comments:

## 2024-05-17 NOTE — PROGRESS NOTES
Pt here for C2D1 Drug(s)Taxotere and Cytoxan.  Arrives Ambulating independently, accompanied by Self     Patient was evaluated today by MD.    Oral medications included in this regimen:  yes - dexamethasone day before, day of and day following treatment    Patient confirms comprehension of cancer treatment schedule:  yes    Pregnancy screening:  Denies possibility of pregnancy    Modifications in dose or schedule:  No    Medications appearance and physical integrity checked by RN: yes.    Chemotherapy IV pump settings verified by 2 RNs:  Yes.  Frequency of blood return and site check throughout administration: Prior to administration, Prior to each drug, and At completion of therapy     Infusion/treatment outcome:  patient tolerated treatment without incident    Education Record    Learner:  Patient  Barriers / Limitations:  None  Method:  Discussion  Education / instructions given:  Medication, plan of care,  Outcome:  Shows understanding    Discharged Home, Ambulating independently, accompanied by:Self    Patient/family verbalized understanding of future appointments: by [employer] messaging

## 2024-06-07 ENCOUNTER — OFFICE VISIT (OUTPATIENT)
Dept: HEMATOLOGY/ONCOLOGY | Age: 49
End: 2024-06-07
Attending: SURGERY
Payer: COMMERCIAL

## 2024-06-07 VITALS
SYSTOLIC BLOOD PRESSURE: 140 MMHG | DIASTOLIC BLOOD PRESSURE: 95 MMHG | HEART RATE: 107 BPM | HEIGHT: 61.85 IN | WEIGHT: 166 LBS | BODY MASS INDEX: 30.55 KG/M2 | RESPIRATION RATE: 18 BRPM | OXYGEN SATURATION: 98 % | TEMPERATURE: 97 F

## 2024-06-07 DIAGNOSIS — C50.812 MALIGNANT NEOPLASM OF OVERLAPPING SITES OF LEFT BREAST IN FEMALE, ESTROGEN RECEPTOR POSITIVE (HCC): Primary | ICD-10-CM

## 2024-06-07 DIAGNOSIS — Z17.0 MALIGNANT NEOPLASM OF OVERLAPPING SITES OF LEFT BREAST IN FEMALE, ESTROGEN RECEPTOR POSITIVE (HCC): Primary | ICD-10-CM

## 2024-06-07 LAB
ALBUMIN SERPL-MCNC: 3.7 G/DL (ref 3.4–5)
ALBUMIN/GLOB SERPL: 1 {RATIO} (ref 1–2)
ALP LIVER SERPL-CCNC: 77 U/L
ALT SERPL-CCNC: 27 U/L
ANION GAP SERPL CALC-SCNC: 9 MMOL/L (ref 0–18)
AST SERPL-CCNC: 16 U/L (ref 15–37)
BASOPHILS # BLD AUTO: 0.02 X10(3) UL (ref 0–0.2)
BASOPHILS NFR BLD AUTO: 0.1 %
BILIRUB SERPL-MCNC: 0.3 MG/DL (ref 0.1–2)
BUN BLD-MCNC: 13 MG/DL (ref 9–23)
CALCIUM BLD-MCNC: 9.4 MG/DL (ref 8.5–10.1)
CHLORIDE SERPL-SCNC: 107 MMOL/L (ref 98–112)
CO2 SERPL-SCNC: 20 MMOL/L (ref 21–32)
CREAT BLD-MCNC: 1.14 MG/DL
EGFRCR SERPLBLD CKD-EPI 2021: 60 ML/MIN/1.73M2 (ref 60–?)
EOSINOPHIL # BLD AUTO: 0 X10(3) UL (ref 0–0.7)
EOSINOPHIL NFR BLD AUTO: 0 %
ERYTHROCYTE [DISTWIDTH] IN BLOOD BY AUTOMATED COUNT: 16.9 %
GLOBULIN PLAS-MCNC: 3.7 G/DL (ref 2.8–4.4)
GLUCOSE BLD-MCNC: 236 MG/DL (ref 70–99)
HCT VFR BLD AUTO: 39.1 %
HGB BLD-MCNC: 12.7 G/DL
IMM GRANULOCYTES # BLD AUTO: 0.39 X10(3) UL (ref 0–1)
IMM GRANULOCYTES NFR BLD: 1.9 %
LYMPHOCYTES # BLD AUTO: 0.86 X10(3) UL (ref 1–4)
LYMPHOCYTES NFR BLD AUTO: 4.2 %
MCH RBC QN AUTO: 27.5 PG (ref 26–34)
MCHC RBC AUTO-ENTMCNC: 32.5 G/DL (ref 31–37)
MCV RBC AUTO: 84.6 FL
MONOCYTES # BLD AUTO: 0.27 X10(3) UL (ref 0.1–1)
MONOCYTES NFR BLD AUTO: 1.3 %
NEUTROPHILS # BLD AUTO: 19.15 X10 (3) UL (ref 1.5–7.7)
NEUTROPHILS # BLD AUTO: 19.15 X10(3) UL (ref 1.5–7.7)
NEUTROPHILS NFR BLD AUTO: 92.5 %
OSMOLALITY SERPL CALC.SUM OF ELEC: 290 MOSM/KG (ref 275–295)
PLATELET # BLD AUTO: 418 10(3)UL (ref 150–450)
POTASSIUM SERPL-SCNC: 3.6 MMOL/L (ref 3.5–5.1)
PROT SERPL-MCNC: 7.4 G/DL (ref 6.4–8.2)
RBC # BLD AUTO: 4.62 X10(6)UL
SODIUM SERPL-SCNC: 136 MMOL/L (ref 136–145)
WBC # BLD AUTO: 20.7 X10(3) UL (ref 4–11)

## 2024-06-07 PROCEDURE — 36415 COLL VENOUS BLD VENIPUNCTURE: CPT

## 2024-06-07 PROCEDURE — 80053 COMPREHEN METABOLIC PANEL: CPT | Performed by: INTERNAL MEDICINE

## 2024-06-07 PROCEDURE — 85025 COMPLETE CBC W/AUTO DIFF WBC: CPT | Performed by: INTERNAL MEDICINE

## 2024-06-07 PROCEDURE — 96417 CHEMO IV INFUS EACH ADDL SEQ: CPT

## 2024-06-07 PROCEDURE — 96413 CHEMO IV INFUSION 1 HR: CPT

## 2024-06-07 PROCEDURE — 96375 TX/PRO/DX INJ NEW DRUG ADDON: CPT

## 2024-06-07 NOTE — PROGRESS NOTES
Cancer Center Progress Note    Problem List:      Patient Active Problem List   Diagnosis    Fibroadenoma of left breast    Varicose vein    Arthritis    Malignant neoplasm of upper-outer quadrant of left breast in female, estrogen receptor positive (HCC)    Esophageal obstruction due to food impaction    Hematoma of left breast    Absence of breast, bilateral    Malignant neoplasm of overlapping sites of left breast in female, estrogen receptor positive (HCC)       Interim History:    Harleen Euceda presents today for evaluation and management of a diagnosis of left breast cancer.    Since last visit she had a second cycle of TC chemotherapy. Since last visit (one week ago) She lost her footing and fell off of a ladder. She twisted her right ankle. She had pain and swelling. This has been getting better.     She did better with the second cycle. She had less constipation. She had no nausea. She had transient fatigue. She has no fever or sweats. She has no abdominal pain. She has no numbness or tingling.      Oncology Hx:  She had bilateral mastectomy by Dr. Proctor. The right breast was benign. The left breast had a 90 mm area of invasive lobular carcinoma. The margins were negative. The SLN procedure had 0/3 nodes.She had a negative metastatic work up with CT of the CAP and bone scan.     She initially had a screening mammogram on 11/27/2023 that showed asymmetry in the left breast. She had diagnostic mammogram and US on 12/11/2023 that showed a 5 x 4 x 5 mm nodule. She had US biopsy on 12/22/2023 that showed invasive lobular carcinoma, grade II with %, %, Ki-67 12% and Her2 2+ IHC; FISH not amplified. She had an MRI of the breasts on 1/4/2024 that showed more extensive abnormality in the left breast with an 11 cm area of enhancement. There were no abnormal appearing axillary nodes.       Review of Systems:   Constitutional: Negative for anorexia, fatigue, fevers, chills, night sweats and weight  loss.  Eyes: Negative for visual disturbance, irritation and redness.  Respiratory: Negative for cough, hemoptysis, chest pain, or dyspnea.  Cardiovascular: Negative for angina, orthopnea or palpitations.  Gastrointestinal: Negative for nausea, vomiting, change in bowel habits, diarrhea, constipation and abdominal pain.  Integument/breast: Negative for rash, skin lesions, and pruritus.  Hematologic/lymphatic: Negative for easy bruising, bleeding, and lymphadenopathy.  Genitourinary: Negative for dysuria or hematuria  Neurological: Negative for headaches, dizziness, speech problems, gait problems and focal weakness.  Psychiatric: The patient's mood was calm and appropriate for this visit.  The pertinent positives and negatives were described. All other systems were negative.    PMH/PSH:  Past Medical History:    Anxiety state    Back problem    Breast cancer (HCC)    Esophageal obstruction due to food impaction    Fibroadenoma of left breast    Hx of motion sickness    PONV (postoperative nausea and vomiting)    with last  over sedated and o2 sat dropped    Varicose vein    Visual impairment       Past Surgical History:   Procedure Laterality Date      ,,    Extraction erupted tooth/exr      wisdom teeth    Insert intrauterine device      Mirena insertion    Mastectomy left  2024    Mastectomy right  2024    Other surgical history Bilateral 2018    RLTCS with bilateral Salpingectomies    Remove intrauterine device      Mirena removal    Skin surgery         Family History Reviewed:  Family History   Problem Relation Age of Onset    Hypertension Father     Diabetes Father     Heart Disorder Father 52        quad bypass    Other (hypothyroid) Father     Breast Cancer Mother 46    Hypertension Mother     Psychiatric Mother         depression    Breast Cancer Maternal Grandmother 63    Other (Cholangiocarinoma) Brother 36       Allergies:     Allergies   Allergen  Reactions    Other SHORTNESS OF BREATH     Peonies and lemongrass.         Medications:   ALPRAZolam 0.25 MG Oral Tab Take 1 tablet (0.25 mg total) by mouth 2 (two) times daily as needed. 30 tablet 1         Vital Signs:      Height: 157.1 cm (5' 1.85\") (06/07 0913)  Weight: 75.3 kg (166 lb) (06/07 0913)  BSA (Calculated - sq m): 1.76 sq meters (06/07 0913)  Pulse: 107 (06/07 0913)  BP: 140/95 (06/07 0913)  Temp: 96.9 °F (36.1 °C) (06/07 0913)  Do Not Use - Resp Rate: --  SpO2: 98 % (06/07 0913)      Performance Status:  ECOG 0: Fully active, able to carry on all pre-disease performance without restriction     Physical Examination:    Constitutional: Patient is alert and oriented x 3, not in acute distress.  Respiratory: Clear to auscultation and percussion. No rales.  No wheezes.  Cardiovascular: Regular rate and rhythm. No murmurs.  Gastrointestinal: Soft, non tender with good bowel sounds.  Musculoskeletal: No pitting edema. No calf tenderness. Mild right ankle swelling with minimal tenderness on the lateral right ankle. No bone tenderness.  Skin: No suspicious skin lesion, no rash, no ulceration.  Lymphatics: There is no palpable lymphadenopathy throughout in the cervical, supraclavicular, or axillary regions.  Psychiatric: The patient's mood is calm and appropriate for this visit.       Labs reviewed at this visit:     Lab Results   Component Value Date    WBC 20.7 (H) 06/07/2024    RBC 4.62 06/07/2024    HGB 12.7 06/07/2024    HCT 39.1 06/07/2024    MCV 84.6 06/07/2024    MCH 27.5 06/07/2024    MCHC 32.5 06/07/2024    RDW 16.9 06/07/2024    .0 06/07/2024     Lab Results   Component Value Date     06/07/2024    K 3.6 06/07/2024     06/07/2024    CO2 20.0 (L) 06/07/2024    BUN 13 06/07/2024    CREATSERUM 1.14 (H) 06/07/2024     (H) 06/07/2024    CA 9.4 06/07/2024    ALKPHO 77 06/07/2024    ALT 27 06/07/2024    AST 16 06/07/2024    BILT 0.3 06/07/2024    ALB 3.7 06/07/2024    TP 7.4  06/07/2024     Pathology from 3/20/2024:  Final Diagnosis:   A.  Excision, left sentinel lymph node #1:  -1 lymph node (0/1), negative for malignancy.     B.  Excision, left sentinel lymph node #2:  -1 lymph node (0/1), negative for malignancy.     C.  Excision, left sentinel lymph node #3:  -1 lymph node (0/1), negative for malignancy.     D.  Right mastectomy:  -Breast tissue with focal atypical lobular hyperplasia (ALH).  -Background breast tissue with fibrocystic changes including stromal fibrosis, usual ductal hyperplasia and cystic apocrine metaplasia.  -Unremarkable skin.  -Negative for malignancy.     E.  Left mastectomy:  -Invasive lobular carcinoma, grade 2, measuring approximately 90 mm (9 cm).        -Associated microcalcifications.  -Extensive lobular carcinoma in situ (LCIS).   -Invasive tumor is 3 mm (0.3 cm) from the closest deep margin of resection.  -No lymphovascular invasion is identified.   -Biopsy site changes are present.   -Unremarkable skin.   -Background breast tissue with fibrocystic changes including cystic apocrine metaplasia and fibroadenomatous changes.        Radiologic imaging reviewed at this visit:    Bone Scan on 1/15/2024:  FINDINGS:    IMAGED AREA:  Whole-body  ABNORMALITIES:  None.  OTHER:  Soft tissue nodule in the lateral aspect of the left breast is noted.  Degenerative changes in the shoulders and knees are noted.  There is scoliosis of the thoracolumbar spine..     Impression   CONCLUSION:    1. No evidence of osseous metastatic disease in the body.  2. Scoliosis of the thoracolumbar spine is noted.       CT CAP on 1/15/2024:  FINDINGS:       CHEST:    LUNGS:  No nodules or infiltrates.  No bronchiectasis.  MEDIASTINUM:  No adenopathy.  PENNY:  No adenopathy.  CARDIAC:  No pericardial effusion.  No significant coronary calcifications.  PLEURA:  No pleural effusion.  CHEST WALL:  There is a left axillary lymph node measuring 1.3 x 1.0 cm.  AORTA:  Normal  caliber.  VASCULATURE:  Smooth tapering.     ABDOMEN/PELVIS:  LIVER:  Uniform parenchyma.  BILIARY:  Nondistended gallbladder.  No biliary dilatation.  PANCREAS:  Uniform parenchyma.  No ductal dilatation.  SPLEEN:  Not enlarged.  KIDNEYS:  Normal anatomic positions.  No hydronephrosis or perinephric stranding.  Uniform parenchyma.  ADRENALS:  Not enlarged.  AORTA:  Smooth tapering.  Patent celiac artery, SMA and KRISTI.  Patent splenic vein and portal vein.  RETROPERITONEUM:  No adenopathy.  BOWEL/MESENTERY:  Normal bowel caliber.  Moderate stool in the colon.  No ascites.  ABDOMINAL WALL:  No hernia.  URINARY BLADDER:  Nondistended.  PELVIC NODES:  None enlarged.  PELVIC ORGANS:  No uterine or ovarian abnormality.  BONES:  Moderate to severe degenerative changes lower cervical spine and L4-L5 facet joints.  Milder changes elsewhere.  Moderate apex left thoracolumbar scoliosis.  Normal vertebral body heights.     Impression   CONCLUSION:    1. Borderline enlarged left axillary lymph node.  2. No additional metastatic findings in the chest, abdomen or pelvis.  3. Details as above.          Assessment/Plan:     Invasive lobular carcinoma of the overlapping quadrants of the left breast:  Clincial T3 disease with 11 cm of MRI abnormality  Clinical N0 disease  %  %  Ki-67 12%  Her2 2+ IHC  Her2 not amplied FISH  Bilateral Mastectomy on 3/20/2024:  Right breast benign  Left breast with 9 cm of ILC  SLN 0/3     The patient has a Luminal breast cancer with strong ER/IA positive and low grade and low Ki-67. She has a very large breast cancer.     She will continue with TC cycle 3 at the same doses. She will continue with the same support meds. If she does well then we will back off with the next cycles.    She will take zofran and compazine PRN. She will be more aggressive with mirralax for her constipation.    She has had day 8 neutropenia. She will monitor for fever. If temp greater than 100.5 she will call. If  temp greater than 101.5 she will come to the ED.    Right ankle sprain:    No evidence of bone tenderness. Consistent with soft tissue injury that is improving. Continue to follow.      Deejay May MD

## 2024-06-07 NOTE — PROGRESS NOTES
Patient here for C3D1 Taxotere/Cytoxan. Patient states that she started to develop a hives on her neck that started last week. Patient denies sun  exposure and heat. Patient took benadryl that offered relief. Patient uses lotion and it helps with itching. Patient denies n/v/d. Patient has has intermittent constipation with loose bm's. Patient recently sprained her right ankle on last Friday. Patient states she did not seek medical care or imaging. No other complaints of pain. Patient has good appetite with adequate fluid intake. Patient states that her surgical sight is . Patient has no further concerns or complaints at this time.     Education Record    Learner:  Patient    Disease / Diagnosis: malignant neoplasm of breast     Barriers / Limitations:  None   Comments:    Method:  Discussion   Comments:    General Topics:  Diet, Medication, Pain, Side effects and symptom management, and Plan of care reviewed   Comments:    Outcome:  Shows understanding   Comments:

## 2024-06-07 NOTE — PROGRESS NOTES
Pt here for C3D1 Drug(s)taxotere, cytoxan.  Arrives Ambulating independently, accompanied by Self     Patient was evaluated today by MD.    Oral medications included in this regimen:  no    Patient confirms comprehension of cancer treatment schedule:  yes    Pregnancy screening:  Denies possibility of pregnancy    Modifications in dose or schedule:  No    Medications appearance and physical integrity checked by RN: yes.    Chemotherapy IV pump settings verified by 2 RNs:  Yes.  Frequency of blood return and site check throughout administration: Prior to administration and At completion of therapy     Infusion/treatment outcome:  patient tolerated treatment without incident    Education Record    Learner:  Patient  Barriers / Limitations:  None  Method:  Discussion  Education / instructions given:  medications administered, appts  Outcome:  Shows understanding    Discharged Home, Ambulating independently, accompanied by:Self    Patient/family verbalized understanding of future appointments: by [employer] messaging

## 2024-06-17 ENCOUNTER — TELEPHONE (OUTPATIENT)
Dept: HEMATOLOGY/ONCOLOGY | Facility: HOSPITAL | Age: 49
End: 2024-06-17

## 2024-06-17 NOTE — TELEPHONE ENCOUNTER
Called patient and advised her per Dr May that she should use warm moist compresses today on the eye and see if that helps resolve pain symptoms. Patient states that it's feeling better since this morning. Patient conveyed understanding and will call if it worsens.

## 2024-06-17 NOTE — TELEPHONE ENCOUNTER
----- Message from [employer]  sent at 6/17/2024  6:06 AM CDT -----  Regarding: Eye swelling & pain  Contact: [phone]  I noticed my left eye was a bit annoyingly painful over the weekend.  Today, it is noticeably swollen, some pain and keeps tearing up.  Not sure what route to go.  Not sure if this is a part of side effects.

## 2024-06-28 ENCOUNTER — OFFICE VISIT (OUTPATIENT)
Dept: HEMATOLOGY/ONCOLOGY | Age: 49
End: 2024-06-28
Attending: SURGERY

## 2024-06-28 VITALS
DIASTOLIC BLOOD PRESSURE: 90 MMHG | OXYGEN SATURATION: 97 % | HEART RATE: 101 BPM | WEIGHT: 165.31 LBS | TEMPERATURE: 98 F | BODY MASS INDEX: 30.42 KG/M2 | HEIGHT: 61.85 IN | SYSTOLIC BLOOD PRESSURE: 135 MMHG | RESPIRATION RATE: 18 BRPM

## 2024-06-28 DIAGNOSIS — C50.812 MALIGNANT NEOPLASM OF OVERLAPPING SITES OF LEFT BREAST IN FEMALE, ESTROGEN RECEPTOR POSITIVE (HCC): ICD-10-CM

## 2024-06-28 DIAGNOSIS — Z17.0 MALIGNANT NEOPLASM OF OVERLAPPING SITES OF LEFT BREAST IN FEMALE, ESTROGEN RECEPTOR POSITIVE (HCC): Primary | ICD-10-CM

## 2024-06-28 DIAGNOSIS — D70.1 CHEMOTHERAPY INDUCED NEUTROPENIA (HCC): Primary | ICD-10-CM

## 2024-06-28 DIAGNOSIS — C50.812 MALIGNANT NEOPLASM OF OVERLAPPING SITES OF LEFT BREAST IN FEMALE, ESTROGEN RECEPTOR POSITIVE (HCC): Primary | ICD-10-CM

## 2024-06-28 DIAGNOSIS — Z17.0 MALIGNANT NEOPLASM OF OVERLAPPING SITES OF LEFT BREAST IN FEMALE, ESTROGEN RECEPTOR POSITIVE (HCC): ICD-10-CM

## 2024-06-28 DIAGNOSIS — T45.1X5A CHEMOTHERAPY INDUCED NEUTROPENIA (HCC): Primary | ICD-10-CM

## 2024-06-28 LAB
ALBUMIN SERPL-MCNC: 3.7 G/DL (ref 3.4–5)
ALBUMIN/GLOB SERPL: 1.1 {RATIO} (ref 1–2)
ALP LIVER SERPL-CCNC: 73 U/L
ALT SERPL-CCNC: 23 U/L
ANION GAP SERPL CALC-SCNC: 7 MMOL/L (ref 0–18)
AST SERPL-CCNC: 12 U/L (ref 15–37)
BASOPHILS # BLD AUTO: 0.03 X10(3) UL (ref 0–0.2)
BASOPHILS NFR BLD AUTO: 0.1 %
BILIRUB SERPL-MCNC: 0.3 MG/DL (ref 0.1–2)
BUN BLD-MCNC: 11 MG/DL (ref 9–23)
CALCIUM BLD-MCNC: 9.5 MG/DL (ref 8.5–10.1)
CHLORIDE SERPL-SCNC: 110 MMOL/L (ref 98–112)
CO2 SERPL-SCNC: 21 MMOL/L (ref 21–32)
CREAT BLD-MCNC: 1.11 MG/DL
EGFRCR SERPLBLD CKD-EPI 2021: 62 ML/MIN/1.73M2 (ref 60–?)
EOSINOPHIL # BLD AUTO: 0.01 X10(3) UL (ref 0–0.7)
EOSINOPHIL NFR BLD AUTO: 0 %
ERYTHROCYTE [DISTWIDTH] IN BLOOD BY AUTOMATED COUNT: 19 %
FASTING STATUS PATIENT QL REPORTED: NO
GLOBULIN PLAS-MCNC: 3.4 G/DL (ref 2.8–4.4)
GLUCOSE BLD-MCNC: 207 MG/DL (ref 70–99)
HCT VFR BLD AUTO: 38.6 %
HGB BLD-MCNC: 12.5 G/DL
IMM GRANULOCYTES # BLD AUTO: 0.25 X10(3) UL (ref 0–1)
IMM GRANULOCYTES NFR BLD: 1.2 %
LYMPHOCYTES # BLD AUTO: 0.81 X10(3) UL (ref 1–4)
LYMPHOCYTES NFR BLD AUTO: 4 %
MCH RBC QN AUTO: 27.2 PG (ref 26–34)
MCHC RBC AUTO-ENTMCNC: 32.4 G/DL (ref 31–37)
MCV RBC AUTO: 83.9 FL
MONOCYTES # BLD AUTO: 0.35 X10(3) UL (ref 0.1–1)
MONOCYTES NFR BLD AUTO: 1.7 %
NEUTROPHILS # BLD AUTO: 18.68 X10 (3) UL (ref 1.5–7.7)
NEUTROPHILS # BLD AUTO: 18.68 X10(3) UL (ref 1.5–7.7)
NEUTROPHILS NFR BLD AUTO: 93 %
OSMOLALITY SERPL CALC.SUM OF ELEC: 291 MOSM/KG (ref 275–295)
PLATELET # BLD AUTO: 396 10(3)UL (ref 150–450)
POTASSIUM SERPL-SCNC: 4 MMOL/L (ref 3.5–5.1)
PROT SERPL-MCNC: 7.1 G/DL (ref 6.4–8.2)
RBC # BLD AUTO: 4.6 X10(6)UL
SODIUM SERPL-SCNC: 138 MMOL/L (ref 136–145)
WBC # BLD AUTO: 20.1 X10(3) UL (ref 4–11)

## 2024-06-28 PROCEDURE — 99215 OFFICE O/P EST HI 40 MIN: CPT | Performed by: INTERNAL MEDICINE

## 2024-06-28 PROCEDURE — 96375 TX/PRO/DX INJ NEW DRUG ADDON: CPT

## 2024-06-28 PROCEDURE — 96413 CHEMO IV INFUSION 1 HR: CPT

## 2024-06-28 PROCEDURE — 96415 CHEMO IV INFUSION ADDL HR: CPT

## 2024-06-28 PROCEDURE — 96417 CHEMO IV INFUS EACH ADDL SEQ: CPT

## 2024-06-28 NOTE — PROGRESS NOTES
Cancer Center Progress Note    Problem List:      Patient Active Problem List   Diagnosis    Fibroadenoma of left breast    Varicose vein    Arthritis    Malignant neoplasm of upper-outer quadrant of left breast in female, estrogen receptor positive (HCC)    Esophageal obstruction due to food impaction    Hematoma of left breast    Absence of breast, bilateral    Malignant neoplasm of overlapping sites of left breast in female, estrogen receptor positive (HCC)       Interim History:    Harleen Euceda presents today for evaluation and management of a diagnosis of left breast cancer.    Since last visit she had a third cycle of TC chemotherapy.     She had constipation but she is controlling this well with laxatives. She had no nausea. She had transient fatigue. She has no fever or sweats. She has no abdominal pain. She has no numbness or tingling.      Oncology Hx:  She had bilateral mastectomy by Dr. Proctor. The right breast was benign. The left breast had a 90 mm area of invasive lobular carcinoma. The margins were negative. The SLN procedure had 0/3 nodes.She had a negative metastatic work up with CT of the CAP and bone scan.     She initially had a screening mammogram on 11/27/2023 that showed asymmetry in the left breast. She had diagnostic mammogram and US on 12/11/2023 that showed a 5 x 4 x 5 mm nodule. She had US biopsy on 12/22/2023 that showed invasive lobular carcinoma, grade II with %, %, Ki-67 12% and Her2 2+ IHC; FISH not amplified. She had an MRI of the breasts on 1/4/2024 that showed more extensive abnormality in the left breast with an 11 cm area of enhancement. There were no abnormal appearing axillary nodes.       Review of Systems:   Constitutional: Negative for anorexia, fatigue, fevers, chills, night sweats and weight loss.  Eyes: Negative for visual disturbance, irritation and redness.  Respiratory: Negative for cough, hemoptysis, chest pain, or dyspnea.  Cardiovascular: Negative for  angina, orthopnea or palpitations.  Gastrointestinal: Negative for nausea, vomiting, change in bowel habits, diarrhea, constipation and abdominal pain.  Integument/breast: Negative for rash, skin lesions, and pruritus.  Hematologic/lymphatic: Negative for easy bruising, bleeding, and lymphadenopathy.  Genitourinary: Negative for dysuria or hematuria  Neurological: Negative for headaches, dizziness, speech problems, gait problems and focal weakness.  Psychiatric: The patient's mood was calm and appropriate for this visit.  The pertinent positives and negatives were described. All other systems were negative.    PMH/PSH:  Past Medical History:    Anxiety state    Back problem    Breast cancer (HCC)    Esophageal obstruction due to food impaction    Fibroadenoma of left breast    Hx of motion sickness    PONV (postoperative nausea and vomiting)    with last  over sedated and o2 sat dropped    Varicose vein    Visual impairment       Past Surgical History:   Procedure Laterality Date      ,,    Extraction erupted tooth/exr      wisdom teeth    Insert intrauterine device      Mirena insertion    Mastectomy left  2024    Mastectomy right  2024    Other surgical history Bilateral 2018    RLTCS with bilateral Salpingectomies    Remove intrauterine device      Mirena removal    Skin surgery         Family History Reviewed:  Family History   Problem Relation Age of Onset    Hypertension Father     Diabetes Father     Heart Disorder Father 52        quad bypass    Other (hypothyroid) Father     Breast Cancer Mother 46    Hypertension Mother     Psychiatric Mother         depression    Breast Cancer Maternal Grandmother 63    Other (Cholangiocarinoma) Brother 36       Allergies:     Allergies   Allergen Reactions    Other SHORTNESS OF BREATH     Peonies and lemongrass.         Medications:   ALPRAZolam 0.25 MG Oral Tab Take 1 tablet (0.25 mg total) by mouth 2 (two) times  daily as needed. 30 tablet 1         Vital Signs:      Height: 157.1 cm (5' 1.85\") (06/28 0931)  Weight: 75 kg (165 lb 4.8 oz) (06/28 0931)  BSA (Calculated - sq m): 1.76 sq meters (06/28 0931)  Pulse: 101 (06/28 0931)  BP: 135/90 (06/28 0931)  Temp: 98 °F (36.7 °C) (06/28 0931)  Do Not Use - Resp Rate: --  SpO2: 97 % (06/28 0931)      Performance Status:  ECOG 0: Fully active, able to carry on all pre-disease performance without restriction     Physical Examination:    Constitutional: Patient is alert and oriented x 3, not in acute distress.  Respiratory: Clear to auscultation and percussion. No rales.  No wheezes.  Cardiovascular: Regular rate and rhythm. No murmurs.  Gastrointestinal: Soft, non tender with good bowel sounds.  Musculoskeletal: No pitting edema. No calf tenderness. Mild right ankle swelling with minimal tenderness on the lateral right ankle. No bone tenderness.  Skin: No suspicious skin lesion, no rash, no ulceration.  Lymphatics: There is no palpable lymphadenopathy throughout in the cervical, supraclavicular, or axillary regions.  Psychiatric: The patient's mood is calm and appropriate for this visit.       Labs reviewed at this visit:     Lab Results   Component Value Date    WBC 20.1 (H) 06/28/2024    RBC 4.60 06/28/2024    HGB 12.5 06/28/2024    HCT 38.6 06/28/2024    MCV 83.9 06/28/2024    MCH 27.2 06/28/2024    MCHC 32.4 06/28/2024    RDW 19.0 06/28/2024    .0 06/28/2024     Lab Results   Component Value Date     06/07/2024    K 3.6 06/07/2024     06/07/2024    CO2 20.0 (L) 06/07/2024    BUN 13 06/07/2024    CREATSERUM 1.14 (H) 06/07/2024     (H) 06/07/2024    CA 9.4 06/07/2024    ALKPHO 77 06/07/2024    ALT 27 06/07/2024    AST 16 06/07/2024    BILT 0.3 06/07/2024    ALB 3.7 06/07/2024    TP 7.4 06/07/2024     Pathology from 3/20/2024:  Final Diagnosis:   A.  Excision, left sentinel lymph node #1:  -1 lymph node (0/1), negative for malignancy.     B.  Excision,  left sentinel lymph node #2:  -1 lymph node (0/1), negative for malignancy.     C.  Excision, left sentinel lymph node #3:  -1 lymph node (0/1), negative for malignancy.     D.  Right mastectomy:  -Breast tissue with focal atypical lobular hyperplasia (ALH).  -Background breast tissue with fibrocystic changes including stromal fibrosis, usual ductal hyperplasia and cystic apocrine metaplasia.  -Unremarkable skin.  -Negative for malignancy.     E.  Left mastectomy:  -Invasive lobular carcinoma, grade 2, measuring approximately 90 mm (9 cm).        -Associated microcalcifications.  -Extensive lobular carcinoma in situ (LCIS).   -Invasive tumor is 3 mm (0.3 cm) from the closest deep margin of resection.  -No lymphovascular invasion is identified.   -Biopsy site changes are present.   -Unremarkable skin.   -Background breast tissue with fibrocystic changes including cystic apocrine metaplasia and fibroadenomatous changes.        Radiologic imaging reviewed at this visit:    Bone Scan on 1/15/2024:  FINDINGS:    IMAGED AREA:  Whole-body  ABNORMALITIES:  None.  OTHER:  Soft tissue nodule in the lateral aspect of the left breast is noted.  Degenerative changes in the shoulders and knees are noted.  There is scoliosis of the thoracolumbar spine..     Impression   CONCLUSION:    1. No evidence of osseous metastatic disease in the body.  2. Scoliosis of the thoracolumbar spine is noted.       CT CAP on 1/15/2024:  FINDINGS:       CHEST:    LUNGS:  No nodules or infiltrates.  No bronchiectasis.  MEDIASTINUM:  No adenopathy.  PENNY:  No adenopathy.  CARDIAC:  No pericardial effusion.  No significant coronary calcifications.  PLEURA:  No pleural effusion.  CHEST WALL:  There is a left axillary lymph node measuring 1.3 x 1.0 cm.  AORTA:  Normal caliber.  VASCULATURE:  Smooth tapering.     ABDOMEN/PELVIS:  LIVER:  Uniform parenchyma.  BILIARY:  Nondistended gallbladder.  No biliary dilatation.  PANCREAS:  Uniform parenchyma.  No  ductal dilatation.  SPLEEN:  Not enlarged.  KIDNEYS:  Normal anatomic positions.  No hydronephrosis or perinephric stranding.  Uniform parenchyma.  ADRENALS:  Not enlarged.  AORTA:  Smooth tapering.  Patent celiac artery, SMA and KRISTI.  Patent splenic vein and portal vein.  RETROPERITONEUM:  No adenopathy.  BOWEL/MESENTERY:  Normal bowel caliber.  Moderate stool in the colon.  No ascites.  ABDOMINAL WALL:  No hernia.  URINARY BLADDER:  Nondistended.  PELVIC NODES:  None enlarged.  PELVIC ORGANS:  No uterine or ovarian abnormality.  BONES:  Moderate to severe degenerative changes lower cervical spine and L4-L5 facet joints.  Milder changes elsewhere.  Moderate apex left thoracolumbar scoliosis.  Normal vertebral body heights.     Impression   CONCLUSION:    1. Borderline enlarged left axillary lymph node.  2. No additional metastatic findings in the chest, abdomen or pelvis.  3. Details as above.          Assessment/Plan:     Invasive lobular carcinoma of the overlapping quadrants of the left breast:  Clincial T3 disease with 11 cm of MRI abnormality  Clinical N0 disease  %  %  Ki-67 12%  Her2 2+ IHC  Her2 not amplied FISH  Bilateral Mastectomy on 3/20/2024:  Right breast benign  Left breast with 9 cm of ILC  SLN 0/3     The patient has a Luminal breast cancer with strong ER/AK positive and low grade and low Ki-67. She has a very large breast cancer.     She will continue with TC cycle 4 at the same doses. She will continue with the same support meds. If she does well then we will back off with the next cycles.    She will take zofran and compazine PRN. She will continue to be aggressive with mirralax for her constipation.    She needs rad onc consultation dur to the size of the primary cancer.    We will plan to treat with endocrine therapy and OFS. I will see her in 3 weeks to discuss this plan.        Deejay May MD

## 2024-06-28 NOTE — PROGRESS NOTES
Pt here for C4D1 Drug(s)taxotere, cytoxan.  Arrives Ambulating independently, accompanied by Self and Family member     Patient was evaluated today by MD.    Oral medications included in this regimen:  no    Patient confirms comprehension of cancer treatment schedule:  yes    Pregnancy screening:  Denies possibility of pregnancy    Modifications in dose or schedule:  No    Medications appearance and physical integrity checked by RN: yes.    Chemotherapy IV pump settings verified by 2 RNs:  Yes.  Frequency of blood return and site check throughout administration: Prior to administration and At completion of therapy     Infusion/treatment outcome:   a few minutes into taxotere infusion patient was having a lot of pain in the whole arm of IV site.  Denies any other symptoms. IV flushed without complication and good blood return. Patient requested medication to be run slower. Per Columba MENARD ok to run slow. Hot pack also applied to IV. Patient tolerated infusion at slower rate and declined to increase rate.     Education Record    Learner:  Patient  Barriers / Limitations:  None  Method:  Discussion  Education / instructions given:  appts, medications administered   Outcome:  Shows understanding    Discharged Home, Ambulating independently, accompanied by:Self    Patient/family verbalized understanding of future appointments: by [employer] messaging

## 2024-06-28 NOTE — PROGRESS NOTES
Patient here for C4D1 Taxotere/Cytoxan. Patient denies n/v/d. Patient states her nailbeds are tender with discoloration. Patient did have an allergic reaction to her sunscreen. Patient denies pain. Patient states she has good appetite and adequate fluid intake.     Education Record    Learner:  Patient    Disease / Diagnosis: malignant neoplasm of breast     Barriers / Limitations:  None   Comments:    Method:  Discussion   Comments:    General Topics:  Medication, Side effects and symptom management, and Plan of care reviewed   Comments:    Outcome:  Shows understanding   Comments:

## 2024-07-09 ENCOUNTER — HOSPITAL ENCOUNTER (OUTPATIENT)
Dept: RADIATION ONCOLOGY | Facility: HOSPITAL | Age: 49
Discharge: HOME | End: 2024-07-09
Attending: RADIOLOGY
Payer: COMMERCIAL

## 2024-07-09 VITALS
DIASTOLIC BLOOD PRESSURE: 95 MMHG | WEIGHT: 164.81 LBS | HEART RATE: 99 BPM | SYSTOLIC BLOOD PRESSURE: 153 MMHG | OXYGEN SATURATION: 99 % | TEMPERATURE: 98 F | RESPIRATION RATE: 16 BRPM | BODY MASS INDEX: 30.33 KG/M2 | HEIGHT: 62 IN

## 2024-07-09 DIAGNOSIS — C50.412 MALIGNANT NEOPLASM OF UPPER-OUTER QUADRANT OF LEFT BREAST IN FEMALE, ESTROGEN RECEPTOR POSITIVE (HCC): Primary | ICD-10-CM

## 2024-07-09 DIAGNOSIS — Z17.0 MALIGNANT NEOPLASM OF UPPER-OUTER QUADRANT OF LEFT BREAST IN FEMALE, ESTROGEN RECEPTOR POSITIVE (HCC): Primary | ICD-10-CM

## 2024-07-09 PROCEDURE — 99214 OFFICE O/P EST MOD 30 MIN: CPT

## 2024-07-09 NOTE — CONSULTS
[facility]  RADIATION ONCOLOGY  CONSULTATION     PATIENT:   Harleen Euceda      REFERRING MD:  Deejay May MD  DIAGNOSIS:   pT3 N0 M0 left breast cancer    CC:    Discuss adjuvant radiation therapy    HPI   48 year old here for consultation.    She presented with a palpable abnormality in the left breast.      Mammography in November 2023 revealed high breast density, with indeterminate asymmetry in the upper posterior left breast without a clear mass.      On diagnostic imaging, there was a 5 mm abnormality in the 2:30 position 8 cm from the nipple.  There was no axillary lymphadenopathy.      Ultrasound-guided biopsy revealed , , Ki-67 12%, HER2 2+, but negative by FISH, grade 2 ILC with LCIS.      MRI 1/4/2024 surprisingly revealed extensive enhancing abnormality measuring 11.1 x 4.6 x 3.2 cm within the upper outer left breast extending from the axillary tail to the retroareolar region.  The right breast was unremarkable.  No clear left axillary adenopathy.  No internal mammary adenopathy.  No skin or pectoralis involvement.      CT and bone scan did not reveal metastatic disease.      On 3/20/2024, she underwent left mastectomy, axillary sentinel node biopsy, prophylactic right mastectomy with complex closure by Dr. Alonzo.  Pathology showed 9 cm grade 2 ILC with extensive LCIS.  Margin width is 3 mm.  No lymphovascular invasion.  All 3 sentinel nodes are negative.  The right mastectomy specimen is negative for malignancy.  No DCIS identified.      She unfortunately developed a left chest wall hematoma which was evacuated by Dr. Mendenhall on 3/28/2024.      She then underwent 4 cycles of TC chemotherapy under Dr. May.      There are plans for endocrine therapy and ovarian function suppression down the road.  She will see Dr. Alonzo to discuss reconstruction options.  She does not have lymphedema.  She has good arm range of motion.    Past Medical History:    Anxiety state    Back problem     Breast cancer (HCC)    Esophageal obstruction due to food impaction    Fibroadenoma of left breast    Hx of motion sickness    PONV (postoperative nausea and vomiting)    with last  over sedated and o2 sat dropped    Varicose vein    Visual impairment     Past Surgical History:   Procedure Laterality Date      ,,    Extraction erupted tooth/exr      wisdom teeth    Insert intrauterine device      Mirena insertion    Mastectomy left  2024    Mastectomy right  2024    Other surgical history Bilateral 2018    RLTCS with bilateral Salpingectomies    Remove intrauterine device      Mirena removal    Skin surgery       Current Outpatient Medications   Medication Instructions    ALPRAZolam (XANAX) 0.25 mg, Oral, 2 times daily PRN     Allergies   Allergen Reactions    Other SHORTNESS OF BREATH     Peonies and lemongrass.       Family History   Problem Relation Age of Onset    Hypertension Father     Diabetes Father     Heart Disorder Father 52        quad bypass    Other (hypothyroid) Father     Breast Cancer Mother 46    Hypertension Mother     Psychiatric Mother         depression    Breast Cancer Maternal Grandmother 63    Other (Cholangiocarinoma) Brother 36     ROS  -pertinent items in HPI.    PHYSICAL EXAM   ECO  GENERAL: 164 pounds.  HEENT:  No neck masses, supple.  CHEST:  Regular rate and rhythm.  ABD:  Soft, non-tender.  EXT:  No edema. Good ROM.  NEURO:  Alert and oriented.    IMPRESSION/PLAN   pT3 N0 left breast cancer s/p mastectomy and SN bx and adjuvant TC chemotherapy    -Plan for endocrine therapy with OFS  -recommend adjuvant radiation therapy to the chest wall to a dose of 50.4 Gy in 28 fractions. I don't think she needs a boost to the scar. No clear benefit with the boost and definitely would increase fibrosis and affect outcome of reconstruction later.   -though the tumor got pretty large, it is biologically luminal A, not high grade, no LVI,  and ER/% strong. She had 3 sentinel nodes WITHOUT neoadjuvant treatment that were negative. So I would not recommend regional nurys radiation.  -plan to use DIBH    -will schedule simulation and being treatment shortly thereafter    Oh-Vasyl Davis MD  Radiation Oncology    CC: MD AROLDO Kan MD L. Pavone, MD    50 minutes were spent with the patient, more than 50 percent on counseling/coordination of care (discuss disease status, goals of therapy, methods of radiation therapy, side effect discussion, role of RT in overall care)

## 2024-07-09 NOTE — PROGRESS NOTES
Nursing Consultation Note  Patient: Harleen Euceda  YOB: 1975  Age: 48 year old  Radiation Oncologist: Dr. Alise Davis  Referring Physician: Dr. May, Dr. Proctor, Dr. Mendenhall/Clinton  Diagnosis: LEFT BREAST CA  Consult Date: 7/9/2024      Chemotherapy: TC x4 cycles completed on 6/28/24  Labs: Reviewed  Imaging: Reviewed  Is the patient of child-bearing age?         Yes   Female: LMP: 6/9/24 Has egg harvesting been discussed? No ... Is there any possibility that the patient is pregnant?   No    Has the patient received radiation therapy in the past? no  Does the patient have an implantable device?No   Patient has/has had:     1. Assistive Devices: N/A    2. Flu Vaccination: no-referral to ask PCP    3. Pneumonia Vaccination:  no--referral to ask PCP    Vital Signs:   Vitals:    07/09/24 1049   BP: (!) 153/95   Pulse: 99   Resp: 16   Temp: 97.9 °F (36.6 °C)   ,   Wt Readings from Last 6 Encounters:   07/09/24 74.8 kg (164 lb 12.8 oz)   06/28/24 75 kg (165 lb 4.8 oz)   06/07/24 75.3 kg (166 lb)   05/17/24 74.6 kg (164 lb 8 oz)   05/03/24 73.9 kg (163 lb)   04/25/24 72.6 kg (160 lb 1.6 oz)       Nursing Note: Diagnosed with left breast ca in Jan 2024. Biopsy of L breast showed ILC, gr 2 with LCIS, ER/MS+ and Her2 not amplified. Met with Dr. May, work-up ordered. Met with Dr. Proctor and Dr. Mendenhall, pt underwent bilat mastectomies with SLN biopsy on 3/22/24. Path showed R breast negative for malignancy. L breast with ILC, gr 2 (9cm) with extensive LCIS. Invasive tumor in 3mm from deep margin, 0/3+SLN. Started chemo afterwards, on cycle 4 of TC as of 6/28/24. Referred for RT consult. Pt here alone, AOx4. Has occ twinges to chest wall, denies edema to site. Has good ROM to bilat upper extremities. No OT/Lymphedema Clinic referral made.        Review of Systems   Constitutional: Negative.         Appetite low related to taste changes   HENT: Negative.     Eyes: Negative.    Respiratory: Negative.      Cardiovascular: Negative.    Gastrointestinal: Negative.         Has loose bowels with chemo, no diarrhea   Endocrine: Negative.    Genitourinary: Negative.    Musculoskeletal: Negative.    Skin: Negative.    Allergic/Immunologic: Negative.    Neurological: Negative.    Hematological: Negative.    Psychiatric/Behavioral: Negative.            Allergies:  Allergies   Allergen Reactions    Other SHORTNESS OF BREATH     Peonies and lemongrass.         Current Outpatient Medications   Medication Sig Dispense Refill    ALPRAZolam 0.25 MG Oral Tab Take 1 tablet (0.25 mg total) by mouth 2 (two) times daily as needed. 30 tablet 1       Preferred Pharmacy:    [id number] [place], [place] - [address] AT [facility] [address]  & , [phone], [phone]  [address]  [facility] [address]  Phone: [phone] Fax: [phone]    [id number] [place], [place] - [address] [facility] AT [facility] [address], [phone], [phone]  [address]  [facility] [address]  Phone: [phone] Fax: [phone]      Past Medical History:    Anxiety state    Back problem    Breast cancer (HCC)    Esophageal obstruction due to food impaction    Fibroadenoma of left breast    Hx of motion sickness    PONV (postoperative nausea and vomiting)    with last  over sedated and o2 sat dropped    Varicose vein    Visual impairment       Past Surgical History:   Procedure Laterality Date      ,,    Extraction erupted tooth/exr      wisdom teeth    Insert intrauterine device      Mirena insertion    Mastectomy left  2024    Mastectomy right  2024    Other surgical history Bilateral 2018    RLTCS with bilateral Salpingectomies    Remove intrauterine device      Mirena removal    Skin surgery         Social History     Socioeconomic History    Marital status:      Spouse name: Not on file    Number of children: 3     Years of education: Not on file    Highest education level: Not on file   Occupational History    Not on file   Tobacco Use    Smoking status: Never    Smokeless tobacco: Never   Vaping Use    Vaping status: Never Used   Substance and Sexual Activity    Alcohol use: Yes     Alcohol/week: 6.0 standard drinks of alcohol     Types: 6 Standard drinks or equivalent per week    Drug use: No    Sexual activity: Not Currently   Other Topics Concern     Service Not Asked    Blood Transfusions Not Asked    Caffeine Concern Yes     Comment: 3 cups of coffee daily    Occupational Exposure Not Asked    Hobby Hazards Not Asked    Sleep Concern Not Asked    Stress Concern Not Asked    Weight Concern Not Asked    Special Diet Not Asked    Back Care Not Asked    Exercise No    Bike Helmet Not Asked    Seat Belt Not Asked    Self-Exams Not Asked   Social History Narrative    , lives at home with 2 sons and 1 daughter    Works as      Social Determinants of Health     Financial Resource Strain: Low Risk  (2024)    Financial Resource Strain     Difficulty of Paying Living Expenses: Not hard at all     Med Affordability: No   Food Insecurity: No Food Insecurity (2024)    Food Insecurity     Food Insecurity: Never true   Transportation Needs: No Transportation Needs (2024)    Transportation Needs     Lack of Transportation: No   Physical Activity: Not on file   Stress: No Stress Concern Present (2024)    Stress     Feeling of Stress : No   Social Connections: Socially Integrated (2024)    Social Connections     Frequency of Socialization with Friends and Family: 3   Housing Stability: Low Risk  (2024)    Housing Stability     Housing Instability: No     Housing Instability Emergency: Not on file     Crib or Bassinette: Not on file       ECOG:  Grade 0 - Fully active, able to carry on all predisease activities without restrictions.      Education: YES  Knowledge  Deficit Plan Of Care:    Problem:  Knowledge Deficit    Problems related to:    Radiation therapy    Interventions:  Instruct on purpose of radiation therapy    Expected Outcomes:  Knowledge of radiation therapy    Progress Toward Outcome:  Making progress    Pamphlets/Handouts Given to Patient:  Understanding radiation therapy      Are ADL's met?  Yes  Does patient feel safe in their environment?  Yes  Care decisions:  Patient and/or surrogate IS involved in care decisions.  Advanced directives:  Patient HAS advnaced directives and a copy has been requested.  Transportation:  Adequate transportation available for expected visits    Pain:   ;Pain Score: 0   ;    ;

## 2024-07-09 NOTE — PATIENT INSTRUCTIONS
- WE WILL CALL TO SCHEDULE YOUR CT SIMULATION FOR RADIATION PLANNING.       - IF YOU HAVE ANY QUESTIONS OR CONCERNS REGARDING RADIATION THERAPY, PLEASE CALL [phone].

## 2024-07-15 ENCOUNTER — HOSPITAL ENCOUNTER (OUTPATIENT)
Dept: RADIATION ONCOLOGY | Facility: HOSPITAL | Age: 49
Discharge: HOME | End: 2024-07-15
Attending: RADIOLOGY
Payer: COMMERCIAL

## 2024-07-19 ENCOUNTER — OFFICE VISIT (OUTPATIENT)
Dept: SURGERY | Facility: CLINIC | Age: 49
End: 2024-07-19
Payer: COMMERCIAL

## 2024-07-19 DIAGNOSIS — Z90.13 ABSENCE OF BREAST, BILATERAL: Primary | ICD-10-CM

## 2024-07-19 PROCEDURE — 99212 OFFICE O/P EST SF 10 MIN: CPT | Performed by: SURGERY

## 2024-07-19 NOTE — PROGRESS NOTES
Harleen Euceda is a 48 year old female who presents today in follow-up after undergoing [place] skin envelope reduction in March. She will begin PMRT next week.    Physical Examination:  Breasts:Bilateral breast incisions are clean dry and intact.  There is no erythema or seroma noted.      Assessment and Plan:  Patient is doing well.  We discussed scar care including massage and moisturizer and silicone products.  The patient may resume regular activity as tolerated.  She will follow-up approximately 3 to 6 months after the completion of her PMRT for re-evaluation. The plan was reviewed with the patient and questions were answered.

## 2024-07-22 ENCOUNTER — OFFICE VISIT (OUTPATIENT)
Dept: HEMATOLOGY/ONCOLOGY | Facility: HOSPITAL | Age: 49
End: 2024-07-22
Attending: SURGERY
Payer: COMMERCIAL

## 2024-07-22 VITALS
HEART RATE: 70 BPM | HEIGHT: 62.01 IN | RESPIRATION RATE: 14 BRPM | SYSTOLIC BLOOD PRESSURE: 135 MMHG | TEMPERATURE: 98 F | OXYGEN SATURATION: 100 % | BODY MASS INDEX: 29.98 KG/M2 | DIASTOLIC BLOOD PRESSURE: 86 MMHG | WEIGHT: 165 LBS

## 2024-07-22 DIAGNOSIS — C50.919 MALIGNANT NEOPLASM OF BREAST (FEMALE) (HCC): Primary | ICD-10-CM

## 2024-07-22 LAB
ALBUMIN SERPL-MCNC: 4 G/DL (ref 3.2–4.8)
ALBUMIN/GLOB SERPL: 1.6 {RATIO} (ref 1–2)
ALP LIVER SERPL-CCNC: 67 U/L
ALT SERPL-CCNC: 14 U/L
ANION GAP SERPL CALC-SCNC: 7 MMOL/L (ref 0–18)
AST SERPL-CCNC: 19 U/L (ref ?–34)
BASOPHILS # BLD AUTO: 0.05 X10(3) UL (ref 0–0.2)
BASOPHILS NFR BLD AUTO: 0.5 %
BILIRUB SERPL-MCNC: 0.3 MG/DL (ref 0.3–1.2)
BUN BLD-MCNC: 15 MG/DL (ref 9–23)
CALCIUM BLD-MCNC: 8.9 MG/DL (ref 8.7–10.4)
CHLORIDE SERPL-SCNC: 106 MMOL/L (ref 98–112)
CO2 SERPL-SCNC: 26 MMOL/L (ref 21–32)
CREAT BLD-MCNC: 0.9 MG/DL
EGFRCR SERPLBLD CKD-EPI 2021: 80 ML/MIN/1.73M2 (ref 60–?)
EOSINOPHIL # BLD AUTO: 0.62 X10(3) UL (ref 0–0.7)
EOSINOPHIL NFR BLD AUTO: 6.5 %
ERYTHROCYTE [DISTWIDTH] IN BLOOD BY AUTOMATED COUNT: 20.4 %
GLOBULIN PLAS-MCNC: 2.5 G/DL (ref 2.8–4.4)
GLUCOSE BLD-MCNC: 83 MG/DL (ref 70–99)
HCT VFR BLD AUTO: 38.3 %
HGB BLD-MCNC: 12.4 G/DL
IMM GRANULOCYTES # BLD AUTO: 0.07 X10(3) UL (ref 0–1)
IMM GRANULOCYTES NFR BLD: 0.7 %
LYMPHOCYTES # BLD AUTO: 1.43 X10(3) UL (ref 1–4)
LYMPHOCYTES NFR BLD AUTO: 15 %
MCH RBC QN AUTO: 27 PG (ref 26–34)
MCHC RBC AUTO-ENTMCNC: 32.4 G/DL (ref 31–37)
MCV RBC AUTO: 83.4 FL
MONOCYTES # BLD AUTO: 0.86 X10(3) UL (ref 0.1–1)
MONOCYTES NFR BLD AUTO: 9 %
NEUTROPHILS # BLD AUTO: 6.49 X10 (3) UL (ref 1.5–7.7)
NEUTROPHILS # BLD AUTO: 6.49 X10(3) UL (ref 1.5–7.7)
NEUTROPHILS NFR BLD AUTO: 68.3 %
OSMOLALITY SERPL CALC.SUM OF ELEC: 288 MOSM/KG (ref 275–295)
PLATELET # BLD AUTO: 372 10(3)UL (ref 150–450)
POTASSIUM SERPL-SCNC: 4 MMOL/L (ref 3.5–5.1)
PROT SERPL-MCNC: 6.5 G/DL (ref 5.7–8.2)
RBC # BLD AUTO: 4.59 X10(6)UL
SODIUM SERPL-SCNC: 139 MMOL/L (ref 136–145)
WBC # BLD AUTO: 9.5 X10(3) UL (ref 4–11)

## 2024-07-22 PROCEDURE — 99214 OFFICE O/P EST MOD 30 MIN: CPT | Performed by: INTERNAL MEDICINE

## 2024-07-22 RX ORDER — TAMOXIFEN CITRATE 20 MG/1
20 TABLET ORAL DAILY
Qty: 90 TABLET | Refills: 3 | Status: SHIPPED | OUTPATIENT
Start: 2024-07-22

## 2024-07-22 NOTE — PROGRESS NOTES
Cancer Center Progress Note    Problem List:      Patient Active Problem List   Diagnosis    Fibroadenoma of left breast    Varicose vein    Arthritis    Malignant neoplasm of upper-outer quadrant of left breast in female, estrogen receptor positive (HCC)    Esophageal obstruction due to food impaction    Hematoma of left breast    Absence of breast, bilateral    Malignant neoplasm of overlapping sites of left breast in female, estrogen receptor positive (HCC)       Interim History:    Harleen Euceda presents today for evaluation and management of a diagnosis of left breast cancer.    Since last visit she had a fourth cycle of TC chemotherapy. Overall she has done well. She had constipation but she is controlling this well with laxatives. She had no nausea. She had transient fatigue. She has no fever or sweats. She has no abdominal pain. She has no numbness or tingling.      Oncology Hx:  She had bilateral mastectomy by Dr. Proctor. The right breast was benign. The left breast had a 90 mm area of invasive lobular carcinoma. The margins were negative. The SLN procedure had 0/3 nodes.She had a negative metastatic work up with CT of the CAP and bone scan.     She initially had a screening mammogram on 11/27/2023 that showed asymmetry in the left breast. She had diagnostic mammogram and US on 12/11/2023 that showed a 5 x 4 x 5 mm nodule. She had US biopsy on 12/22/2023 that showed invasive lobular carcinoma, grade II with %, %, Ki-67 12% and Her2 2+ IHC; FISH not amplified. She had an MRI of the breasts on 1/4/2024 that showed more extensive abnormality in the left breast with an 11 cm area of enhancement. There were no abnormal appearing axillary nodes.       Review of Systems:   Constitutional: Negative for anorexia, fatigue, fevers, chills, night sweats and weight loss.  Eyes: Negative for visual disturbance, irritation and redness.  Respiratory: Negative for cough, hemoptysis, chest pain, or  dyspnea.  Cardiovascular: Negative for angina, orthopnea or palpitations.  Gastrointestinal: Negative for nausea, vomiting, change in bowel habits, diarrhea, constipation and abdominal pain.  Integument/breast: Negative for rash, skin lesions, and pruritus.  Hematologic/lymphatic: Negative for easy bruising, bleeding, and lymphadenopathy.  Genitourinary: Negative for dysuria or hematuria  Neurological: Negative for headaches, dizziness, speech problems, gait problems and focal weakness.  Psychiatric: The patient's mood was calm and appropriate for this visit.  The pertinent positives and negatives were described. All other systems were negative.    PMH/PSH:  Past Medical History:    Anxiety state    Back problem    Breast cancer (HCC)    Esophageal obstruction due to food impaction    Fibroadenoma of left breast    Hx of motion sickness    PONV (postoperative nausea and vomiting)    with last  over sedated and o2 sat dropped    Varicose vein    Visual impairment       Past Surgical History:   Procedure Laterality Date      ,,    Extraction erupted tooth/exr      wisdom teeth    Insert intrauterine device      Mirena insertion    Mastectomy left  2024    Mastectomy right  2024    Other surgical history Bilateral 2018    RLTCS with bilateral Salpingectomies    Remove intrauterine device      Mirena removal    Skin surgery         Family History Reviewed:  Family History   Problem Relation Age of Onset    Hypertension Father     Diabetes Father     Heart Disorder Father 52        quad bypass    Other (hypothyroid) Father     Breast Cancer Mother 46    Hypertension Mother     Psychiatric Mother         depression    Breast Cancer Maternal Grandmother 63    Other (Cholangiocarinoma) Brother 36       Allergies:     Allergies   Allergen Reactions    Other SHORTNESS OF BREATH     Peonies and lemongrass.         Medications:   ALPRAZolam 0.25 MG Oral Tab Take 1 tablet  (0.25 mg total) by mouth 2 (two) times daily as needed. 30 tablet 1         Vital Signs:      Height: 157.5 cm (5' 2.01\") (07/22 1512)  Weight: 74.8 kg (165 lb) (07/22 1512)  BSA (Calculated - sq m): 1.76 sq meters (07/22 1512)  Pulse: 70 (07/22 1512)  BP: 135/86 (07/22 1512)  Temp: 98.2 °F (36.8 °C) (07/22 1512)  Do Not Use - Resp Rate: --  SpO2: 100 % (07/22 1512)      Performance Status:  ECOG 0: Fully active, able to carry on all pre-disease performance without restriction     Physical Examination:    Constitutional: Patient is alert and oriented x 3, not in acute distress.  Respiratory: Clear to auscultation and percussion. No rales.  No wheezes.  Cardiovascular: Regular rate and rhythm. No murmurs.  Gastrointestinal: Soft, non tender with good bowel sounds.  Musculoskeletal: No pitting edema. No calf tenderness. Mild right ankle swelling with minimal tenderness on the lateral right ankle. No bone tenderness.  Skin: No suspicious skin lesion, no rash, no ulceration.  Lymphatics: There is no palpable lymphadenopathy throughout in the cervical, supraclavicular, or axillary regions.  Psychiatric: The patient's mood is calm and appropriate for this visit.       Labs reviewed at this visit:     Lab Results   Component Value Date    WBC 9.5 07/22/2024    RBC 4.59 07/22/2024    HGB 12.4 07/22/2024    HCT 38.3 07/22/2024    MCV 83.4 07/22/2024    MCH 27.0 07/22/2024    MCHC 32.4 07/22/2024    RDW 20.4 07/22/2024    .0 07/22/2024     Lab Results   Component Value Date     07/22/2024    K 4.0 07/22/2024     07/22/2024    CO2 26.0 07/22/2024    BUN 15 07/22/2024    CREATSERUM 0.90 07/22/2024    GLU 83 07/22/2024    CA 8.9 07/22/2024    ALKPHO 67 07/22/2024    ALT 14 07/22/2024    AST 19 07/22/2024    BILT 0.3 07/22/2024    ALB 4.0 07/22/2024    TP 6.5 07/22/2024     Pathology from 3/20/2024:  Final Diagnosis:   A.  Excision, left sentinel lymph node #1:  -1 lymph node (0/1), negative for malignancy.      B.  Excision, left sentinel lymph node #2:  -1 lymph node (0/1), negative for malignancy.     C.  Excision, left sentinel lymph node #3:  -1 lymph node (0/1), negative for malignancy.     D.  Right mastectomy:  -Breast tissue with focal atypical lobular hyperplasia (ALH).  -Background breast tissue with fibrocystic changes including stromal fibrosis, usual ductal hyperplasia and cystic apocrine metaplasia.  -Unremarkable skin.  -Negative for malignancy.     E.  Left mastectomy:  -Invasive lobular carcinoma, grade 2, measuring approximately 90 mm (9 cm).        -Associated microcalcifications.  -Extensive lobular carcinoma in situ (LCIS).   -Invasive tumor is 3 mm (0.3 cm) from the closest deep margin of resection.  -No lymphovascular invasion is identified.   -Biopsy site changes are present.   -Unremarkable skin.   -Background breast tissue with fibrocystic changes including cystic apocrine metaplasia and fibroadenomatous changes.        Radiologic imaging reviewed at this visit:    Bone Scan on 1/15/2024:  FINDINGS:    IMAGED AREA:  Whole-body  ABNORMALITIES:  None.  OTHER:  Soft tissue nodule in the lateral aspect of the left breast is noted.  Degenerative changes in the shoulders and knees are noted.  There is scoliosis of the thoracolumbar spine..     Impression   CONCLUSION:    1. No evidence of osseous metastatic disease in the body.  2. Scoliosis of the thoracolumbar spine is noted.       CT CAP on 1/15/2024:  FINDINGS:       CHEST:    LUNGS:  No nodules or infiltrates.  No bronchiectasis.  MEDIASTINUM:  No adenopathy.  PENNY:  No adenopathy.  CARDIAC:  No pericardial effusion.  No significant coronary calcifications.  PLEURA:  No pleural effusion.  CHEST WALL:  There is a left axillary lymph node measuring 1.3 x 1.0 cm.  AORTA:  Normal caliber.  VASCULATURE:  Smooth tapering.     ABDOMEN/PELVIS:  LIVER:  Uniform parenchyma.  BILIARY:  Nondistended gallbladder.  No biliary dilatation.  PANCREAS:  Uniform  parenchyma.  No ductal dilatation.  SPLEEN:  Not enlarged.  KIDNEYS:  Normal anatomic positions.  No hydronephrosis or perinephric stranding.  Uniform parenchyma.  ADRENALS:  Not enlarged.  AORTA:  Smooth tapering.  Patent celiac artery, SMA and KRISTI.  Patent splenic vein and portal vein.  RETROPERITONEUM:  No adenopathy.  BOWEL/MESENTERY:  Normal bowel caliber.  Moderate stool in the colon.  No ascites.  ABDOMINAL WALL:  No hernia.  URINARY BLADDER:  Nondistended.  PELVIC NODES:  None enlarged.  PELVIC ORGANS:  No uterine or ovarian abnormality.  BONES:  Moderate to severe degenerative changes lower cervical spine and L4-L5 facet joints.  Milder changes elsewhere.  Moderate apex left thoracolumbar scoliosis.  Normal vertebral body heights.     Impression   CONCLUSION:    1. Borderline enlarged left axillary lymph node.  2. No additional metastatic findings in the chest, abdomen or pelvis.  3. Details as above.          Assessment/Plan:     Invasive lobular carcinoma of the overlapping quadrants of the left breast:  Clincial T3 disease with 11 cm of MRI abnormality  Clinical N0 disease  Grade II  %  %  Ki-67 12%  Her2 2+ IHC  Her2 not amplied FISH  Bilateral Mastectomy on 3/20/2024:  Right breast benign  Left breast with 9 cm of ILC  SLN 0/3  TC x 4 completed in 6/2024     The patient has a Luminal breast cancer with strong ER/UT positive and low grade and low Ki-67. She has a very large breast cancer.     She has completed the adjuvant chemotherapy. Her last menstrual period was 6/9/2024. I had a long discussion about adjuvant endocrine therapy. She has a T3 invasive lobular cancer that has high risk for recurrence. I would recommend OFS and endocrine therapy. We have decided to first start with tamoxifen 20 mg daily. She will proceed with radiation for her locally advanced disease. I will plan to see her in about three months to reassess. We will consider starting a GNRH agonist and an AI at that time.  I discussed the risk of tamoxifen including thrombosis, uterine cancer, hot flashes. I also discuss the risk of the AI's including hot flashes, arthralgias, decreased bone density and vaginal symptoms. We will again discuss this at the next visit. We will also need to get a baseline bone density DEXA. We will get this after the next visit.      Deejay May MD

## 2024-07-22 NOTE — PROGRESS NOTES
Patient here for a 1 month f/u. Patient states she has a rash that is r/t hot flashes. Patient put hydrocortisone cream on her rash that appeared on her right forearm and has since resolved. Patient starts radiation on 7/31/24. Patient has no further concerns or complaints at this time.     Education Record    Learner:  Patient    Disease / Diagnosis: malignant neoplasm of breast     Barriers / Limitations:  None   Comments:    Method:  Discussion   Comments:    General Topics:  Medication, Side effects and symptom management, and Plan of care reviewed   Comments:    Outcome:  Shows understanding   Comments:

## 2024-07-31 ENCOUNTER — HOSPITAL ENCOUNTER (OUTPATIENT)
Dept: RADIATION ONCOLOGY | Facility: HOSPITAL | Age: 49
Discharge: HOME | End: 2024-07-31
Attending: RADIOLOGY
Payer: COMMERCIAL

## 2024-08-01 ENCOUNTER — HOSPITAL ENCOUNTER (OUTPATIENT)
Dept: RADIATION ONCOLOGY | Facility: HOSPITAL | Age: 49
Discharge: HOME | End: 2024-08-01
Attending: RADIOLOGY
Payer: COMMERCIAL

## 2024-08-01 VITALS
DIASTOLIC BLOOD PRESSURE: 90 MMHG | HEART RATE: 86 BPM | SYSTOLIC BLOOD PRESSURE: 135 MMHG | TEMPERATURE: 99 F | OXYGEN SATURATION: 100 % | RESPIRATION RATE: 18 BRPM

## 2024-08-01 NOTE — PROGRESS NOTES
3    [facility] Radiation Treatment Management Note 1-5    Patient:  Harleen Euceda  Age:  48 year old  Visit Diagnosis:  No diagnosis found.  Primary Rad/Onc:  Dr. Alise Davis    Site Delivered Dose (cGy) Prescribed Dose (cGy) Fraction #   L CW NO BOLUS 0 900 0/5   L CW BOLUS 360 6841 2/23     First treatment date:  7/31/24  Concurrent chemotherapy: N/A        7/9/2024    10:49 AM 7/22/2024     3:12 PM 8/1/2024     3:25 PM   Oncology Vitals   Height 5' 2\" 5' 2.008\"    Height 158 cm 158 cm    Weight 164 lb 12.8 oz 165 lb    Weight 74.753 kg 74.844 kg    BSA (m2) 1.76 m2 1.76 m2    BMI 30.14 kg/m2 30.17 kg/m2    /95 135/86 135/90   Pulse 99 70 86   Resp 16 14 18   Temp 97.9 °F (36.6 °C) 98.2 °F (36.8 °C) 98.8 °F (37.1 °C)   SpO2 99 % 100 % 100 %   Pain Score 0 0 0        Toxicities:  Fatigue Grade 1= Fatigue relieved by rest  Pruritis Grade 0= None  Dry Skin absent  Dermatitis associated with radiation Grade 0= None  Moisturizer used Aquaphor applied Number of times: 2 times daily.  Hyperpigmentation absent    Nursing Note:  Pt tolerating RT well  Reviewed potential SE of RT and management  Reviewed skin care  Gave samples of aquaphor and komal PALMER RN    Physician Note:  Subjective:    Newer start, no issues  Objective:  NAD  No skin changes yet       Treatment setup imaging have been reviewed:  Yes    Assessment/Plan:    T3NO L CW    Skin care    Continue radiotherapy per plan    Next visit:  1 week    Dr. Xander Urbina for Dr Davis

## 2024-08-08 ENCOUNTER — HOSPITAL ENCOUNTER (OUTPATIENT)
Dept: RADIATION ONCOLOGY | Facility: HOSPITAL | Age: 49
Discharge: HOME | End: 2024-08-08
Attending: RADIOLOGY
Payer: COMMERCIAL

## 2024-08-08 VITALS
OXYGEN SATURATION: 100 % | TEMPERATURE: 98 F | SYSTOLIC BLOOD PRESSURE: 122 MMHG | RESPIRATION RATE: 18 BRPM | HEART RATE: 72 BPM | DIASTOLIC BLOOD PRESSURE: 85 MMHG

## 2024-08-08 DIAGNOSIS — C50.412 MALIGNANT NEOPLASM OF UPPER-OUTER QUADRANT OF LEFT BREAST IN FEMALE, ESTROGEN RECEPTOR POSITIVE (HCC): Primary | ICD-10-CM

## 2024-08-08 DIAGNOSIS — Z17.0 MALIGNANT NEOPLASM OF UPPER-OUTER QUADRANT OF LEFT BREAST IN FEMALE, ESTROGEN RECEPTOR POSITIVE (HCC): Primary | ICD-10-CM

## 2024-08-08 RX ORDER — MOMETASONE FUROATE 1 MG/G
CREAM TOPICAL
Qty: 45 G | Refills: 2 | Status: SHIPPED | OUTPATIENT
Start: 2024-08-08

## 2024-08-14 NOTE — PROGRESS NOTES
[facility] Radiation Treatment Management Note 11-15    Patient:  Harleen Euceda  Age:  48 year old  Visit Diagnosis:    1. Malignant neoplasm of upper-outer quadrant of left breast in female, estrogen receptor positive (HCC)      Primary Rad/Onc:  Dr. Alise Davis    Site Delivered Dose (cGy) Prescribed Dose (cGy) Fraction #   LEFT CW W/BOLUS 2160 4140 12/23   L CW W/O BOLUS 0 900 0/5     First treatment date:   7/31/24  Concurrent chemotherapy:  N/A        8/1/2024     3:25 PM 8/8/2024     8:50 AM 8/15/2024     8:06 AM   Oncology Vitals   /90 122/85 127/84   Pulse 86 72 65   Resp 18 18 16   Temp 98.8 °F (37.1 °C) 98.2 °F (36.8 °C) 97.8 °F (36.6 °C)   SpO2 100 % 100 % 98 %   Pain Score 0 0 0        Toxicities:  Fatigue Grade 1= Fatigue relieved by rest  Pruritis Grade 1= Mild or localized; topical intervention indicated  Dry Skin absent  Dermatitis associated with radiation Grade 2= Moderate to brisk erythema, patchy moist desquamation mostly confined to skin folds and creases; moderate edema  Moisturizer used Mometasone, Aquaphor, and Eucerin applied Number of times: 2 times daily.  Hyperpigmentation absent    Nursing Note:  C/O itching and increased irritation to L CW.  Noted dry peel to inner quadrant, states d/t bra rubbing.  Recommended Telfa drsg between bra and skin, samples provided.  Full ROM to ANGELA.    Jackelin PALMER RN MD NOTE   Reviewed and agree with RN note above.  Setup imaging reviewed in ARIA and approved.    S:  -tender along medial edge of scar    O:  -redness along medial edge of scar inferiorly; intact, no infection    A/P:  -avoid elocon to that spot    OTV in 1 week    Alise Davis MD  Radiation Oncology

## 2024-08-15 ENCOUNTER — HOSPITAL ENCOUNTER (OUTPATIENT)
Dept: RADIATION ONCOLOGY | Facility: HOSPITAL | Age: 49
Discharge: HOME | End: 2024-08-15
Attending: RADIOLOGY
Payer: COMMERCIAL

## 2024-08-15 VITALS
DIASTOLIC BLOOD PRESSURE: 84 MMHG | TEMPERATURE: 98 F | HEART RATE: 65 BPM | RESPIRATION RATE: 16 BRPM | OXYGEN SATURATION: 98 % | SYSTOLIC BLOOD PRESSURE: 127 MMHG

## 2024-08-15 DIAGNOSIS — Z17.0 MALIGNANT NEOPLASM OF UPPER-OUTER QUADRANT OF LEFT BREAST IN FEMALE, ESTROGEN RECEPTOR POSITIVE (HCC): Primary | ICD-10-CM

## 2024-08-15 DIAGNOSIS — C50.412 MALIGNANT NEOPLASM OF UPPER-OUTER QUADRANT OF LEFT BREAST IN FEMALE, ESTROGEN RECEPTOR POSITIVE (HCC): Primary | ICD-10-CM

## 2024-08-21 NOTE — PROGRESS NOTES
[facility] Radiation Treatment Management Note 16-20    Patient:  Harleen Euceda  Age:  48 year old  Visit Diagnosis:    1. Malignant neoplasm of upper-outer quadrant of left breast in female, estrogen receptor positive (HCC)      Primary Rad/Onc:  Dr. Alise Davis    Site Delivered Dose (cGy) Prescribed Dose (cGy) Fraction #   LEFT CW W/BOLUS 3060 4140 17/23   L CW W/O BOLUS 0 900 0/5     First treatment date:  7/31/24  Concurrent chemotherapy: N/A        8/8/2024     8:50 AM 8/15/2024     8:06 AM 8/22/2024     8:40 AM   Oncology Vitals   /85 127/84 126/87   Pulse 72 65 67   Resp 18 16 18   Temp 98.2 °F (36.8 °C) 97.8 °F (36.6 °C) 98.1 °F (36.7 °C)   SpO2 100 % 98 % 100 %   Pain Score 0 0 0        Toxicities:  Fatigue Grade 1= Fatigue relieved by rest  Pruritis Grade 1= Mild or localized; topical intervention indicated  Dry Skin absent  Dermatitis associated with radiation Grade 1= Faint erythema or dry desquamation  Moisturizer used Mometasone and vanicream  applied Number of times: 1 times daily.  Hyperpigmentation absent    Nursing Note:  Reports fatigue  Has a lot going on with work and home  Skin with mild erythema  Mild pruritus  No open areas noted  Using mometasone and vanicream 1-2x/day    Barbara PALMER RN MD NOTE   Reviewed and agree with RN note above.  Setup imaging reviewed in ARIA and approved.    S:  -medial incision area is better, has been using neosporin, hectic work/home life    O:  -redness along incision but o/w ok    A/P:  -cont RT  OTV in 1 week    Alise Davis MD  Radiation Oncology

## 2024-08-22 ENCOUNTER — HOSPITAL ENCOUNTER (OUTPATIENT)
Dept: RADIATION ONCOLOGY | Facility: HOSPITAL | Age: 49
Discharge: HOME | End: 2024-08-22
Attending: RADIOLOGY
Payer: COMMERCIAL

## 2024-08-22 VITALS
TEMPERATURE: 98 F | DIASTOLIC BLOOD PRESSURE: 87 MMHG | SYSTOLIC BLOOD PRESSURE: 126 MMHG | OXYGEN SATURATION: 100 % | HEART RATE: 67 BPM | RESPIRATION RATE: 18 BRPM

## 2024-08-22 DIAGNOSIS — Z17.0 MALIGNANT NEOPLASM OF UPPER-OUTER QUADRANT OF LEFT BREAST IN FEMALE, ESTROGEN RECEPTOR POSITIVE (HCC): Primary | ICD-10-CM

## 2024-08-22 DIAGNOSIS — C50.412 MALIGNANT NEOPLASM OF UPPER-OUTER QUADRANT OF LEFT BREAST IN FEMALE, ESTROGEN RECEPTOR POSITIVE (HCC): Primary | ICD-10-CM

## 2024-08-28 NOTE — PROGRESS NOTES
[facility] Radiation Treatment Management Note 21-25    Patient:  Harleen Euceda  Age:  48 year old  Visit Diagnosis:    1. Malignant neoplasm of upper-outer quadrant of left breast in female, estrogen receptor positive (HCC)      Primary Rad/Onc:  Dr. Alise Davis    Site Delivered Dose (cGy) Prescribed Dose (cGy) Fraction #   L CW Bolus 3960 4140 22/23   L CW No Bolus 0 900 0/5     First treatment date:   7/31/24  Concurrent chemotherapy:  none        8/15/2024     8:06 AM 8/22/2024     8:40 AM 8/29/2024     7:58 AM   Oncology Vitals   /84 126/87 124/86   Pulse 65 67 55   Resp 16 18 16   Temp 97.8 °F (36.6 °C) 98.1 °F (36.7 °C) 97.8 °F (36.6 °C)   SpO2 98 % 100 % 100 %   Pain Score 0 0 2        Toxicities:  Fatigue Grade 0= None  Pruritis Grade 1= Mild or localized; topical intervention indicated- occasional  Dry Skin noted  Dermatitis associated with radiation Grade 1= Faint erythema or dry desquamation  Moisturizer used Mometasone and Eucerin applied Number of times: 2 times daily.  Hyperpigmentation noted    Nursing Note:  Patient seen for OTV with Dr. Davis.   Mild itchiness- only occasionally. Just started having sensation after surgery.   Neospoirin to one small area along lower incision.   Mild tenderness bilaterally. Left axilla tenderness. Nothing taken PRN.   Hot flashes.     Natalya PRESTON RN MD NOTE   Reviewed and agree with RN note above.  Setup imaging reviewed in ARIA and approved.    S:  -doing ok with skin irritation, using creams appropriately     O:  -diffuse redness; intact    A/P:  -cont steroid and moisturizer creams    OTV in 1 week    Alise Davis MD  Radiation Oncology

## 2024-08-29 ENCOUNTER — APPOINTMENT (OUTPATIENT)
Dept: RADIATION ONCOLOGY | Facility: HOSPITAL | Age: 49
End: 2024-08-29
Attending: RADIOLOGY
Payer: COMMERCIAL

## 2024-08-29 ENCOUNTER — HOSPITAL ENCOUNTER (OUTPATIENT)
Dept: RADIATION ONCOLOGY | Facility: HOSPITAL | Age: 49
Discharge: HOME | End: 2024-08-29
Attending: RADIOLOGY
Payer: COMMERCIAL

## 2024-08-29 VITALS
OXYGEN SATURATION: 100 % | SYSTOLIC BLOOD PRESSURE: 124 MMHG | RESPIRATION RATE: 16 BRPM | HEART RATE: 55 BPM | DIASTOLIC BLOOD PRESSURE: 86 MMHG | TEMPERATURE: 98 F

## 2024-08-29 DIAGNOSIS — C50.412 MALIGNANT NEOPLASM OF UPPER-OUTER QUADRANT OF LEFT BREAST IN FEMALE, ESTROGEN RECEPTOR POSITIVE (HCC): Primary | ICD-10-CM

## 2024-08-29 DIAGNOSIS — Z17.0 MALIGNANT NEOPLASM OF UPPER-OUTER QUADRANT OF LEFT BREAST IN FEMALE, ESTROGEN RECEPTOR POSITIVE (HCC): Primary | ICD-10-CM

## 2024-09-01 ENCOUNTER — HOSPITAL ENCOUNTER (OUTPATIENT)
Dept: RADIATION ONCOLOGY | Facility: HOSPITAL | Age: 49
Discharge: HOME | End: 2024-09-01
Attending: RADIOLOGY
Payer: COMMERCIAL

## 2024-09-03 PROCEDURE — 77412 RADIATION TX DELIVERY LVL 3: CPT | Performed by: RADIOLOGY

## 2024-09-03 PROCEDURE — 77387 GUIDANCE FOR RADJ TX DLVR: CPT | Performed by: RADIOLOGY

## 2024-09-04 PROCEDURE — 77412 RADIATION TX DELIVERY LVL 3: CPT | Performed by: RADIOLOGY

## 2024-09-04 PROCEDURE — 77387 GUIDANCE FOR RADJ TX DLVR: CPT | Performed by: RADIOLOGY

## 2024-09-04 NOTE — PROGRESS NOTES
[facility] Radiation Treatment Management Note 26-30    Patient:  Harleen Euceda  Age:  48 year old  Visit Diagnosis:    1. Malignant neoplasm of upper-outer quadrant of left breast in female, estrogen receptor positive (HCC)      Primary Rad/Onc:  Dr. Alise Davis    Site Delivered Dose (cGy) Prescribed Dose (cGy) Fraction #   L CW BOLUS 4140 4140 23/23   L CW NO BOLUS 540 900 3/5     First treatment date:  7/31/24  Concurrent chemotherapy: N/A        8/22/2024     8:40 AM 8/29/2024     7:58 AM 9/5/2024     8:09 AM   Oncology Vitals   /87 124/86 126/89   Pulse 67 55 73   Resp 18 16 18   Temp 98.1 °F (36.7 °C) 97.8 °F (36.6 °C) 97.4 °F (36.3 °C)   SpO2 100 % 100 % 99 %   Pain Score 0 2 3     Toxicities:  Fatigue Grade 1= Fatigue relieved by rest  Pruritis Grade 1= Mild or localized; topical intervention indicated  Dry Skin noted  Dermatitis associated with radiation Grade 2= Moderate to brisk erythema, patchy moist desquamation mostly confined to skin folds and creases; moderate edema  Moisturizer used Mometasone and Eucerin applied Number of times: 2 times daily.  Hyperpigmentation absent    Nursing Note:  Pt has open area in axilla  No drainage  Denies fevers  Rest of CW with moderate erythema- no other open areas  Using mometasone and eucerin  Will finish RT on 9/9/24    Barbara PALMER RN MD NOTE   Reviewed and agree with RN note above.  Setup imaging reviewed in ARIA and approved.    S:  -doing well    O:  -minor dry peel axilla    A/P:  -ssd bid to axilla x 7d, avoid elocon to area, cont elocon x 5 d after RT, f/u Feb, call if need help with skin care    Alise Davis MD  Radiation Oncology

## 2024-09-05 ENCOUNTER — HOSPITAL ENCOUNTER (OUTPATIENT)
Dept: RADIATION ONCOLOGY | Facility: HOSPITAL | Age: 49
Discharge: HOME | End: 2024-09-05
Attending: RADIOLOGY
Payer: COMMERCIAL

## 2024-09-05 VITALS
SYSTOLIC BLOOD PRESSURE: 126 MMHG | DIASTOLIC BLOOD PRESSURE: 89 MMHG | OXYGEN SATURATION: 99 % | TEMPERATURE: 97 F | RESPIRATION RATE: 18 BRPM | HEART RATE: 73 BPM

## 2024-09-05 DIAGNOSIS — C50.412 MALIGNANT NEOPLASM OF UPPER-OUTER QUADRANT OF LEFT BREAST IN FEMALE, ESTROGEN RECEPTOR POSITIVE (HCC): Primary | ICD-10-CM

## 2024-09-05 DIAGNOSIS — Z17.0 MALIGNANT NEOPLASM OF UPPER-OUTER QUADRANT OF LEFT BREAST IN FEMALE, ESTROGEN RECEPTOR POSITIVE (HCC): Primary | ICD-10-CM

## 2024-09-05 PROCEDURE — 77387 GUIDANCE FOR RADJ TX DLVR: CPT | Performed by: RADIOLOGY

## 2024-09-05 PROCEDURE — 77412 RADIATION TX DELIVERY LVL 3: CPT | Performed by: RADIOLOGY

## 2024-09-05 NOTE — PATIENT INSTRUCTIONS
- WE WILL CALL TO SCHEDULE YOU FOR A FOLLOW-UP WITH DR. DAVID IN FEB 2025  -AVOID PUTTING MOMETASONE ON OPEN SKIN AREA  -STOP MOMETASONE IN 5 DAYS  -APPLY SILVADINE CREAM TO OPEN AREA TWICE A DAY FOR 7 DAYS  - SIDE EFFECTS OF RADIATION WILL GRADUALLY SUBSIDE. IT MAY TAKE 1- 2 WEEKS POST-RADIATION FOR YOU TO NOTICE CHANGES SUCH AS A DECREASE IN YOUR FATIGUE LEVEL.   - CONTINUE WITH SKIN CARE: APPLY MILD CREAM TO TREATED AREA 2-3X/DAY, DO NOT USE HOT/COLD PACKS DIRECTLY ON SITE, USE MILD SOAP/DEODORANT TO AFFECTED AREA, KEEP OPEN TO AIR WHEN AT HOME.   - CALL THE NURSE LINE AT [phone] IF YOU HAVE ANY QUESTIONS/CONCERNS REGARDING RADIATION THERAPY OR SKIN CARE

## 2024-09-06 PROCEDURE — 77412 RADIATION TX DELIVERY LVL 3: CPT | Performed by: RADIOLOGY

## 2024-09-06 PROCEDURE — 77336 RADIATION PHYSICS CONSULT: CPT | Performed by: RADIOLOGY

## 2024-09-06 PROCEDURE — 77387 GUIDANCE FOR RADJ TX DLVR: CPT | Performed by: RADIOLOGY

## 2024-09-09 ENCOUNTER — DOCUMENTATION ONLY (OUTPATIENT)
Dept: RADIATION ONCOLOGY | Facility: HOSPITAL | Age: 49
End: 2024-09-09

## 2024-09-09 PROCEDURE — 77412 RADIATION TX DELIVERY LVL 3: CPT | Performed by: RADIOLOGY

## 2024-09-09 PROCEDURE — 77387 GUIDANCE FOR RADJ TX DLVR: CPT | Performed by: RADIOLOGY

## 2024-09-09 PROCEDURE — 77336 RADIATION PHYSICS CONSULT: CPT | Performed by: RADIOLOGY

## 2024-09-12 NOTE — PROGRESS NOTES
[facility]  TREATMENT SUMMARY     PATIENT:  Harleen Euceda    REFERRING MD: RAQUEL May MD  DIAGNOSIS:  Left breast cancer    HISTORY   48-year-old woman presented with something palpable in the left breast.  She was found to have grade 2 ILC,   KI 12% and HER2 2+ but negative by FISH.  Located at the 2:30 position, 8 cm from the nipple.  Imaging did not show axillary adenopathy.  MRI unexpectedly revealed extensive enhancement measuring 11 x 5 x 3 cm in the upper outer left breast.  Again, no axillary or internal mammary adenopathy.  Disease did not involve the skin or pectoralis.  Metastatic workup was negative.      She underwent left mastectomy with sentinel lymph node biopsy, prophylactic right mastectomy without reconstruction.  Pathology showed 9 cm grade 2 ILC, no LVI, negative margins, negative nodes.  The right mastectomy specimen was benign.      Surgery was complicated by a left chest wall hematoma, evacuated on 3/28/2024.      She then completed 4 cycles of TC chemotherapy.      She does plan to pursue reconstruction with Dr. Alonzo.      Aside from tumor size, she had uniformly favorable pathologic features.  Therefore adjuvant radiation limited to the chest wall was advised given her node-negative status.    DOSE DELIVERED     Left chest wall   5040 cGy in 28 fractions   6 MV photons   [facility]   7/31/2024 to 9/9/2024     Concurrent systemic Rx  No  IGRT    Yes    CLINICAL COURSE   She received chest wall radiation with and without bolus with deep inspiratory breath-hold.  Treatment was well-tolerated with skin reaction minimized using mometasone.    PLAN   Follow up in radiation oncology:    -2/2025    Alise Davis M.D.  Radiation Oncology    CC: RAQUEL May MD

## 2024-09-13 ENCOUNTER — TELEPHONE (OUTPATIENT)
Dept: HEMATOLOGY/ONCOLOGY | Facility: HOSPITAL | Age: 49
End: 2024-09-13

## 2024-09-13 NOTE — TELEPHONE ENCOUNTER
Spoke to patient about scheduling survivorship, she is interested but only available on Mondays. Contacted Kacie about a potential Monday visit. Will call patient with the next steps.

## 2024-09-30 ENCOUNTER — OFFICE VISIT (OUTPATIENT)
Dept: HEMATOLOGY/ONCOLOGY | Facility: HOSPITAL | Age: 49
End: 2024-09-30
Attending: NURSE PRACTITIONER
Payer: COMMERCIAL

## 2024-09-30 DIAGNOSIS — Z85.3 PERSONAL HISTORY OF BREAST CANCER: ICD-10-CM

## 2024-09-30 DIAGNOSIS — Z08 ENCOUNTER FOR FOLLOW-UP EXAMINATION AFTER COMPLETED TREATMENT FOR MALIGNANT NEOPLASM: Primary | ICD-10-CM

## 2024-09-30 DIAGNOSIS — Z71.9 COUNSELING, UNSPECIFIED: ICD-10-CM

## 2024-09-30 PROCEDURE — 99417 PROLNG OP E/M EACH 15 MIN: CPT | Performed by: NURSE PRACTITIONER

## 2024-09-30 PROCEDURE — 99215 OFFICE O/P EST HI 40 MIN: CPT | Performed by: NURSE PRACTITIONER

## 2024-09-30 NOTE — PROGRESS NOTES
I met with Harleen for a Survivorship Clinic visit to provide a survivorship care plan (SCP) and information related to post-treatment care.   Harleen presented at age 48 for screening mammogram on 11/27/23 that showed asymmetry in the left breast. She had diagnostic mammogram and US on 12/11/23 that showed a [id number] nodule. Ultrasound biopsy on 12/22/23 showed invasive lobular carcinoma, grade 2, ER+, CO+, Ki-6712%, HER2-. MRI on 1/4/24 showed more extensive abnormality in the left breast with an 11cm area of enhancement and no abnormal appearing axillary nodes.  She has a family history of breast cancer and genetic testing done was negative for pathogenic variants. On 3/20/24 Dr. Joseph Proctor performed bilateral mastectomies with left sentinel lymph node biopsy and Dr. Brandon Alonzo performed skin envelope reduction via Wise Pattern approach. Pathology in the left breast showed pT3, pN0, cM0, Gr2, ER+, CO+, HER2- Stage IB. The right breast showed focal atypical lobular hyperplasia that was negative for malignancy. On 3/28/24 she had evacuation of left chest wall hematoma. She was seen by Dr. Deejay May who recommended adjuvant chemotherapy with Docetaxel and Cyclophosphamide given for 4 cycles and completing on 6/28/24. Dr. May recommended endocrine therapy with Tamoxifen which was started in 7/2024 and he may change this to an Aromatase inhibitor with ovarian function suppression and will discuss at a future visit. She then received radiation therapy to the left chest wall with Dr. Alise Davis that completed on 9/9/24.  She will meet with Dr. Alonzo in 1/25 to discuss future reconstruction. (See Oncology Treatment Summary SCP for details).      Current condition/issues: Harleen is doing well post-treatment. She has recovered well from surgery and has no ROM issues or noted swelling. She does have sensitive skin in the chest wall which has made it challenging to wear certain bras or use prosthetics without them  causing discomfort. She is aware of places we use for prosthetics, but will give herself more time to recover. She appears to be doing okay post-bilateral mastectomy. Since completion of chemotherapy she has noted nail changes on her hands with breakage and peeling. She has been taking Tamoxifen since 7/2024. She admits to difficulty remembering to take and misses 1 dose/week.  She has been taking it at different times most days due to erratic work schedule and is trying to decide on which time of day will work best for her. She has a pill container she will work with. She has hot flashes, sometimes night sweats. She otherwise has no side effects.    She admits to needing improvements in her diet. She tracks her steps each day and gets 7000/day now. She is interested in exercise at a fitness center with more cardio work and is interested in [facility], referral will be placed. Current BMI is 30 and she hopes to maintain her weight per Dr. Alonzo recommendation for next reconstruction with fat grafting. She also tracks her sleep and gets 6-7 hours/night. She does wake with hot flashes and sometimes has difficulty returning to sleep if her mind starts to race. She admits to occasional anxiety but no depression. She had seen [facility] for 1-2 sessions at initial diagnosis but did not continue. She is aware of ongoing counseling that she could get if needed. She currently drinks alcohol daily and plans to reduce intake.     Survivorship Care Plan and letter: Harleen was provided a hard copy of the SCP and a letter that outlined the purpose of the visit and plan.  We reviewed all elements of both documents. She is aware that a letter and SCP will be sent to her PCP, Dr. Ezra Jones.     Reviewed Cancer Surveillance and that Dr. May will oversee this care.  She will see him on 10/21/24 with bloodwork. While on Tamoxifen, she is due for annual pelvic exam with Dr. Dale Martinez in 11/24. She will see Dr. Davis in 2/25  and Dr. Proctor in 4/25.     Reviewed Schedule of other clinic visits with Primary Care and specialists:  Robert will continue to manage all general health care recommended for age and gender including cancer screening tests.  She is due for colon screening at some point. She was encouraged to continue to see specialists at usual intervals.     Reviewed concerning symptoms that she should report to any Provider.      Reviewed possible late and long-term effects related to the treatment that was received.  We reviewed care of the surgical arm and management of hormone related side effects including vaginal dryness if this were to occur.  We reviewed impact of body image and resources available.     Reviewed common cancer survivor issues and resources available.  She was provided resources for stress management, psychosocial resources (group or individual), nutrition, exercise and survivorship programs.  Various survivorship resources were provided to use going forward as needed (see below).     Reviewed Lifestyle/behaviors that can affect ongoing health, including the risk for the cancer coming back or developing another cancer.  We reviewed importance of physical activity including aerobic, strength training and weight bearing exercise.  We reviewed a plant based diet.  We reviewed skin care and sun safety.     The patient received a take-home folder that included the following survivorship resources:   -SCP and patient letter  -Breast Survivorship Guide   -[facility] - nutrition and exercise classes, mind/body programs, education, support groups  -[facility]-education, support groups, special programs, nutrition and exercise classes, movement classes, mind/body programs, survivorship programs, individual counseling  -[facility] -education, support groups, special programs, nutrition and exercise classes, movement classes, mind/body programs, survivorship programs,  individual counseling  -[facility]  -[facility]-referred to [facility]  -Lymphedema Prevention  -[facility]-has used before  -[employer].gov handout-nutrition, physical activity  -ACS Cancer Screening Guidelines  -Websites: American Cancer Society, Living Beyond Breast Cancer, Facebook: Maggi Marr, Ce Perkins, Endocrine Therapies, [employer] for your Life, ACS Survivorship Network, National Coalition for Cancer Survivorship     The patient was given the opportunity to ask questions.  She had few questions and verbalized understanding of the information we discussed today.  Emotional support was provided. My total time spent caring for the patient on the day of the encounter: 90 minutes (10 minutes reviewing chart, 60 minutes of face to face counseling regarding survivorship education, review of care plan and additional resources and 20 minutes of documentation).  The patient was encouraged to call with any further questions.   Referrals were made for [facility]. She will contact myself or Dr. May if interested in counseling.   AUDRA Ambriz

## 2024-10-18 ENCOUNTER — LAB ENCOUNTER (OUTPATIENT)
Dept: LAB | Age: 49
End: 2024-10-18
Attending: INTERNAL MEDICINE
Payer: COMMERCIAL

## 2024-10-18 DIAGNOSIS — C50.919 MALIGNANT NEOPLASM OF BREAST (FEMALE) (HCC): ICD-10-CM

## 2024-10-18 LAB
ALBUMIN SERPL-MCNC: 4.4 G/DL (ref 3.2–4.8)
ALBUMIN/GLOB SERPL: 1.6 {RATIO} (ref 1–2)
ALP LIVER SERPL-CCNC: 69 U/L
ALT SERPL-CCNC: 15 U/L
ANION GAP SERPL CALC-SCNC: 7 MMOL/L (ref 0–18)
AST SERPL-CCNC: 18 U/L (ref ?–34)
BASOPHILS # BLD AUTO: 0.06 X10(3) UL (ref 0–0.2)
BASOPHILS NFR BLD AUTO: 0.8 %
BILIRUB SERPL-MCNC: 0.4 MG/DL (ref 0.3–1.2)
BUN BLD-MCNC: 13 MG/DL (ref 9–23)
CALCIUM BLD-MCNC: 9.9 MG/DL (ref 8.7–10.4)
CHLORIDE SERPL-SCNC: 107 MMOL/L (ref 98–112)
CO2 SERPL-SCNC: 26 MMOL/L (ref 21–32)
CREAT BLD-MCNC: 0.96 MG/DL
EGFRCR SERPLBLD CKD-EPI 2021: 73 ML/MIN/1.73M2 (ref 60–?)
EOSINOPHIL # BLD AUTO: 0.71 X10(3) UL (ref 0–0.7)
EOSINOPHIL NFR BLD AUTO: 10 %
ERYTHROCYTE [DISTWIDTH] IN BLOOD BY AUTOMATED COUNT: 16.3 %
FASTING STATUS PATIENT QL REPORTED: YES
GLOBULIN PLAS-MCNC: 2.8 G/DL (ref 2–3.5)
GLUCOSE BLD-MCNC: 91 MG/DL (ref 70–99)
HCT VFR BLD AUTO: 44.1 %
HGB BLD-MCNC: 14.3 G/DL
IMM GRANULOCYTES # BLD AUTO: 0.01 X10(3) UL (ref 0–1)
IMM GRANULOCYTES NFR BLD: 0.1 %
LYMPHOCYTES # BLD AUTO: 1.41 X10(3) UL (ref 1–4)
LYMPHOCYTES NFR BLD AUTO: 19.9 %
MCH RBC QN AUTO: 28.6 PG (ref 26–34)
MCHC RBC AUTO-ENTMCNC: 32.4 G/DL (ref 31–37)
MCV RBC AUTO: 88.2 FL
MONOCYTES # BLD AUTO: 0.64 X10(3) UL (ref 0.1–1)
MONOCYTES NFR BLD AUTO: 9.1 %
NEUTROPHILS # BLD AUTO: 4.24 X10 (3) UL (ref 1.5–7.7)
NEUTROPHILS # BLD AUTO: 4.24 X10(3) UL (ref 1.5–7.7)
NEUTROPHILS NFR BLD AUTO: 60.1 %
OSMOLALITY SERPL CALC.SUM OF ELEC: 290 MOSM/KG (ref 275–295)
PLATELET # BLD AUTO: 258 10(3)UL (ref 150–450)
POTASSIUM SERPL-SCNC: 4.9 MMOL/L (ref 3.5–5.1)
PROT SERPL-MCNC: 7.2 G/DL (ref 5.7–8.2)
RBC # BLD AUTO: 5 X10(6)UL
SODIUM SERPL-SCNC: 140 MMOL/L (ref 136–145)
WBC # BLD AUTO: 7.1 X10(3) UL (ref 4–11)

## 2024-10-18 PROCEDURE — 36415 COLL VENOUS BLD VENIPUNCTURE: CPT

## 2024-10-18 PROCEDURE — 85025 COMPLETE CBC W/AUTO DIFF WBC: CPT

## 2024-10-18 PROCEDURE — 80053 COMPREHEN METABOLIC PANEL: CPT

## 2024-10-21 ENCOUNTER — OFFICE VISIT (OUTPATIENT)
Dept: HEMATOLOGY/ONCOLOGY | Facility: HOSPITAL | Age: 49
End: 2024-10-21
Attending: SURGERY
Payer: COMMERCIAL

## 2024-10-21 VITALS
TEMPERATURE: 97 F | WEIGHT: 164 LBS | DIASTOLIC BLOOD PRESSURE: 93 MMHG | OXYGEN SATURATION: 96 % | HEIGHT: 62.01 IN | BODY MASS INDEX: 29.8 KG/M2 | SYSTOLIC BLOOD PRESSURE: 152 MMHG | HEART RATE: 74 BPM

## 2024-10-21 DIAGNOSIS — Z17.0 MALIGNANT NEOPLASM OF OVERLAPPING SITES OF LEFT BREAST IN FEMALE, ESTROGEN RECEPTOR POSITIVE (HCC): Primary | ICD-10-CM

## 2024-10-21 DIAGNOSIS — C50.812 MALIGNANT NEOPLASM OF OVERLAPPING SITES OF LEFT BREAST IN FEMALE, ESTROGEN RECEPTOR POSITIVE (HCC): Primary | ICD-10-CM

## 2024-10-21 PROCEDURE — 99215 OFFICE O/P EST HI 40 MIN: CPT | Performed by: INTERNAL MEDICINE

## 2024-10-21 RX ORDER — LETROZOLE 2.5 MG/1
2.5 TABLET, FILM COATED ORAL DAILY
Qty: 90 TABLET | Refills: 3 | Status: SHIPPED | OUTPATIENT
Start: 2024-10-21

## 2024-10-21 NOTE — PROGRESS NOTES
Cancer Center Progress Note    Problem List:      Patient Active Problem List   Diagnosis    Fibroadenoma of left breast    Varicose vein    Arthritis    Malignant neoplasm of upper-outer quadrant of left breast in female, estrogen receptor positive (HCC)    Esophageal obstruction due to food impaction    Hematoma of left breast    Absence of breast, bilateral    Malignant neoplasm of overlapping sites of left breast in female, estrogen receptor positive (HCC)       Interim History:    Harleen Euceda presents today for evaluation and management of a diagnosis of left breast cancer.    Since last visit she had a fourth cycle of TC chemotherapy in 7/2024. She completed radiation on 9/9/2024. She has been on tamoxifen 20 mg daily. She has had hot flashes intermittently. She has no menstrual periods since getting chemotherapy. SHe now returns after radiation to discuss further management.      Oncology Hx:  She had bilateral mastectomy by Dr. Proctor. The right breast was benign. The left breast had a 90 mm area of invasive lobular carcinoma. The margins were negative. The SLN procedure had 0/3 nodes.She had a negative metastatic work up with CT of the CAP and bone scan.     She initially had a screening mammogram on 11/27/2023 that showed asymmetry in the left breast. She had diagnostic mammogram and US on 12/11/2023 that showed a 5 x 4 x 5 mm nodule. She had US biopsy on 12/22/2023 that showed invasive lobular carcinoma, grade II with %, %, Ki-67 12% and Her2 2+ IHC; FISH not amplified. She had an MRI of the breasts on 1/4/2024 that showed more extensive abnormality in the left breast with an 11 cm area of enhancement. There were no abnormal appearing axillary nodes.       Review of Systems:   Constitutional: Negative for anorexia, fatigue, fevers, chills, night sweats and weight loss.  Eyes: Negative for visual disturbance, irritation and redness.  Respiratory: Negative for cough, hemoptysis, chest pain, or  dyspnea.  Cardiovascular: Negative for angina, orthopnea or palpitations.  Gastrointestinal: Negative for nausea, vomiting, change in bowel habits, diarrhea, constipation and abdominal pain.  Integument/breast: Negative for rash, skin lesions, and pruritus.  Hematologic/lymphatic: Negative for easy bruising, bleeding, and lymphadenopathy.  Genitourinary: Negative for dysuria or hematuria  Neurological: Negative for headaches, dizziness, speech problems, gait problems and focal weakness.  Psychiatric: The patient's mood was calm and appropriate for this visit.  The pertinent positives and negatives were described. All other systems were negative.    PMH/PSH:  Past Medical History:    Anxiety state    Back problem    Breast cancer (HCC)    Esophageal obstruction due to food impaction    Fibroadenoma of left breast    Hx of motion sickness    PONV (postoperative nausea and vomiting)    with last  over sedated and o2 sat dropped    Varicose vein    Visual impairment       Past Surgical History:   Procedure Laterality Date      ,,    Extraction erupted tooth/exr      wisdom teeth    Insert intrauterine device      Mirena insertion    Mastectomy left  2024    Mastectomy right  2024    Other surgical history Bilateral 2018    RLTCS with bilateral Salpingectomies    Remove intrauterine device      Mirena removal    Skin surgery         Family History Reviewed:  Family History   Problem Relation Age of Onset    Hypertension Father     Diabetes Father     Heart Disorder Father 52        quad bypass    Other (hypothyroid) Father     Breast Cancer Mother 46    Hypertension Mother     Psychiatric Mother         depression    Breast Cancer Maternal Grandmother 63    Other (Cholangiocarinoma) Brother 36       Allergies:     Allergies   Allergen Reactions    Other SHORTNESS OF BREATH     Peonies and lemongrass.         Medications:   tamoxifen 20 MG Oral Tab Take 1 tablet (20  mg total) by mouth daily. 90 tablet 3    ALPRAZolam 0.25 MG Oral Tab Take 1 tablet (0.25 mg total) by mouth 2 (two) times daily as needed. 30 tablet 1         Vital Signs:      Height: 157.5 cm (5' 2.01\") (10/21 1053)  Weight: 74.4 kg (164 lb) (10/21 1053)  BSA (Calculated - sq m): 1.76 sq meters (10/21 1053)  Pulse: 74 (10/21 1053)  BP: 152/93 (10/21 1053)  Temp: 97.3 °F (36.3 °C) (10/21 1053)  Do Not Use - Resp Rate: --  SpO2: 96 % (10/21 1053)      Performance Status:  ECOG 0: Fully active, able to carry on all pre-disease performance without restriction     Physical Examination:    Constitutional: Patient is alert and oriented x 3, not in acute distress.  Respiratory: Clear to auscultation and percussion. No rales.  No wheezes.  Cardiovascular: Regular rate and rhythm. No murmurs.  Gastrointestinal: Soft, non tender with good bowel sounds.  Musculoskeletal: No pitting edema. No calf tenderness. Mild right ankle swelling with minimal tenderness on the lateral right ankle. No bone tenderness.  Skin: No suspicious skin lesion, no rash, no ulceration.  Lymphatics: There is no palpable lymphadenopathy throughout in the cervical, supraclavicular, or axillary regions.  Psychiatric: The patient's mood is calm and appropriate for this visit.       Labs reviewed at this visit:     Lab Results   Component Value Date    WBC 7.1 10/18/2024    RBC 5.00 10/18/2024    HGB 14.3 10/18/2024    HCT 44.1 10/18/2024    MCV 88.2 10/18/2024    MCH 28.6 10/18/2024    MCHC 32.4 10/18/2024    RDW 16.3 10/18/2024    .0 10/18/2024     Lab Results   Component Value Date     10/18/2024    K 4.9 10/18/2024     10/18/2024    CO2 26.0 10/18/2024    BUN 13 10/18/2024    CREATSERUM 0.96 10/18/2024    GLU 91 10/18/2024    CA 9.9 10/18/2024    ALKPHO 69 10/18/2024    ALT 15 10/18/2024    AST 18 10/18/2024    BILT 0.4 10/18/2024    ALB 4.4 10/18/2024    TP 7.2 10/18/2024     Pathology from 3/20/2024:  Final Diagnosis:   KP   Excision, left sentinel lymph node #1:  -1 lymph node (0/1), negative for malignancy.     B.  Excision, left sentinel lymph node #2:  -1 lymph node (0/1), negative for malignancy.     C.  Excision, left sentinel lymph node #3:  -1 lymph node (0/1), negative for malignancy.     D.  Right mastectomy:  -Breast tissue with focal atypical lobular hyperplasia (ALH).  -Background breast tissue with fibrocystic changes including stromal fibrosis, usual ductal hyperplasia and cystic apocrine metaplasia.  -Unremarkable skin.  -Negative for malignancy.     E.  Left mastectomy:  -Invasive lobular carcinoma, grade 2, measuring approximately 90 mm (9 cm).        -Associated microcalcifications.  -Extensive lobular carcinoma in situ (LCIS).   -Invasive tumor is 3 mm (0.3 cm) from the closest deep margin of resection.  -No lymphovascular invasion is identified.   -Biopsy site changes are present.   -Unremarkable skin.   -Background breast tissue with fibrocystic changes including cystic apocrine metaplasia and fibroadenomatous changes.        Radiologic imaging reviewed at this visit:    Bone Scan on 1/15/2024:  FINDINGS:    IMAGED AREA:  Whole-body  ABNORMALITIES:  None.  OTHER:  Soft tissue nodule in the lateral aspect of the left breast is noted.  Degenerative changes in the shoulders and knees are noted.  There is scoliosis of the thoracolumbar spine..     Impression   CONCLUSION:    1. No evidence of osseous metastatic disease in the body.  2. Scoliosis of the thoracolumbar spine is noted.       CT CAP on 1/15/2024:  FINDINGS:       CHEST:    LUNGS:  No nodules or infiltrates.  No bronchiectasis.  MEDIASTINUM:  No adenopathy.  PENNY:  No adenopathy.  CARDIAC:  No pericardial effusion.  No significant coronary calcifications.  PLEURA:  No pleural effusion.  CHEST WALL:  There is a left axillary lymph node measuring 1.3 x 1.0 cm.  AORTA:  Normal caliber.  VASCULATURE:  Smooth tapering.     ABDOMEN/PELVIS:  LIVER:  Uniform  parenchyma.  BILIARY:  Nondistended gallbladder.  No biliary dilatation.  PANCREAS:  Uniform parenchyma.  No ductal dilatation.  SPLEEN:  Not enlarged.  KIDNEYS:  Normal anatomic positions.  No hydronephrosis or perinephric stranding.  Uniform parenchyma.  ADRENALS:  Not enlarged.  AORTA:  Smooth tapering.  Patent celiac artery, SMA and KRISTI.  Patent splenic vein and portal vein.  RETROPERITONEUM:  No adenopathy.  BOWEL/MESENTERY:  Normal bowel caliber.  Moderate stool in the colon.  No ascites.  ABDOMINAL WALL:  No hernia.  URINARY BLADDER:  Nondistended.  PELVIC NODES:  None enlarged.  PELVIC ORGANS:  No uterine or ovarian abnormality.  BONES:  Moderate to severe degenerative changes lower cervical spine and L4-L5 facet joints.  Milder changes elsewhere.  Moderate apex left thoracolumbar scoliosis.  Normal vertebral body heights.     Impression   CONCLUSION:    1. Borderline enlarged left axillary lymph node.  2. No additional metastatic findings in the chest, abdomen or pelvis.  3. Details as above.          Assessment/Plan:     Invasive lobular carcinoma of the overlapping quadrants of the left breast:  Clincial T3 disease with 11 cm of MRI abnormality  Clinical N0 disease  Grade II  %  %  Ki-67 12%  Her2 2+ IHC  Her2 not amplied FISH  Bilateral Mastectomy on 3/20/2024:  Right breast benign  Left breast with 9 cm of ILC  SLN 0/3  TC x 4 completed in 6/2024  Started tamoxifen in 8/2024     The patient has a Luminal breast cancer with strong ER/NE positive and low grade and low Ki-67. She has a very large breast cancer.     She has completed the radiation and hs has started tamoxifen. She is tolerating this reasonably well. Her last menstrual period was 6/9/2024. I again had a long discussion about adjuvant endocrine therapy. She has a T3 invasive lobular cancer that has high risk for recurrence. I would recommend OFS and endocrine therapy. I recommended that we start monthly goserelin and also start  Letrozole 2.5 mg daily. I then discussed the recent FDA approval for adjuvant Ribociclib. She has a T3 breast cancer and is consider eligible for this therapy. I recommended that she return in four weeks when she would be due for the second goserelin injection. We will then plan to start ribociclib at that time. She has had a baseline EKG that looks good. I discussed that we would follow the EKG for QT interval. I discussed the risk of low blood counts, fatigue and nausea. She will return in four weeks.      Deejay May MD

## 2024-10-23 DIAGNOSIS — Z17.0 MALIGNANT NEOPLASM OF OVERLAPPING SITES OF LEFT BREAST IN FEMALE, ESTROGEN RECEPTOR POSITIVE (HCC): Primary | ICD-10-CM

## 2024-10-23 DIAGNOSIS — C50.812 MALIGNANT NEOPLASM OF OVERLAPPING SITES OF LEFT BREAST IN FEMALE, ESTROGEN RECEPTOR POSITIVE (HCC): Primary | ICD-10-CM

## 2024-10-25 DIAGNOSIS — C50.812 MALIGNANT NEOPLASM OF OVERLAPPING SITES OF LEFT BREAST IN FEMALE, ESTROGEN RECEPTOR POSITIVE (HCC): ICD-10-CM

## 2024-10-25 DIAGNOSIS — Z17.0 MALIGNANT NEOPLASM OF OVERLAPPING SITES OF LEFT BREAST IN FEMALE, ESTROGEN RECEPTOR POSITIVE (HCC): ICD-10-CM

## 2024-11-04 ENCOUNTER — OFFICE VISIT (OUTPATIENT)
Dept: HEMATOLOGY/ONCOLOGY | Facility: HOSPITAL | Age: 49
End: 2024-11-04
Attending: SURGERY
Payer: COMMERCIAL

## 2024-11-04 VITALS
WEIGHT: 165.19 LBS | HEART RATE: 74 BPM | SYSTOLIC BLOOD PRESSURE: 154 MMHG | HEIGHT: 62.01 IN | TEMPERATURE: 98 F | OXYGEN SATURATION: 99 % | RESPIRATION RATE: 16 BRPM | DIASTOLIC BLOOD PRESSURE: 94 MMHG | BODY MASS INDEX: 30.02 KG/M2

## 2024-11-04 DIAGNOSIS — C50.812 MALIGNANT NEOPLASM OF OVERLAPPING SITES OF LEFT BREAST IN FEMALE, ESTROGEN RECEPTOR POSITIVE (HCC): Primary | ICD-10-CM

## 2024-11-04 DIAGNOSIS — Z17.0 MALIGNANT NEOPLASM OF OVERLAPPING SITES OF LEFT BREAST IN FEMALE, ESTROGEN RECEPTOR POSITIVE (HCC): Primary | ICD-10-CM

## 2024-11-04 LAB
ALBUMIN SERPL-MCNC: 4 G/DL (ref 3.2–4.8)
ALBUMIN SERPL-MCNC: 4 G/DL (ref 3.2–4.8)
ALBUMIN/GLOB SERPL: 1.4 {RATIO} (ref 1–2)
ALP LIVER SERPL-CCNC: 69 U/L
ALP LIVER SERPL-CCNC: 69 U/L
ALT SERPL-CCNC: 11 U/L
ALT SERPL-CCNC: 11 U/L
ANION GAP SERPL CALC-SCNC: 6 MMOL/L (ref 0–18)
AST SERPL-CCNC: 18 U/L (ref ?–34)
AST SERPL-CCNC: 18 U/L (ref ?–34)
BASOPHILS # BLD AUTO: 0.06 X10(3) UL (ref 0–0.2)
BASOPHILS NFR BLD AUTO: 0.7 %
BILIRUB DIRECT SERPL-MCNC: <0.1 MG/DL (ref ?–0.3)
BILIRUB SERPL-MCNC: 0.2 MG/DL (ref 0.3–1.2)
BILIRUB SERPL-MCNC: 0.2 MG/DL (ref 0.3–1.2)
BUN BLD-MCNC: 10 MG/DL (ref 9–23)
CALCIUM BLD-MCNC: 9.7 MG/DL (ref 8.7–10.4)
CHLORIDE SERPL-SCNC: 106 MMOL/L (ref 98–112)
CHOLEST SERPL-MCNC: 165 MG/DL (ref ?–200)
CO2 SERPL-SCNC: 26 MMOL/L (ref 21–32)
CREAT BLD-MCNC: 1.01 MG/DL
EGFRCR SERPLBLD CKD-EPI 2021: 69 ML/MIN/1.73M2 (ref 60–?)
EOSINOPHIL # BLD AUTO: 0.59 X10(3) UL (ref 0–0.7)
EOSINOPHIL NFR BLD AUTO: 7.2 %
ERYTHROCYTE [DISTWIDTH] IN BLOOD BY AUTOMATED COUNT: 15.6 %
GLOBULIN PLAS-MCNC: 2.8 G/DL (ref 2–3.5)
GLUCOSE BLD-MCNC: 99 MG/DL (ref 70–99)
HCT VFR BLD AUTO: 40.8 %
HDLC SERPL-MCNC: 60 MG/DL (ref 40–59)
HGB BLD-MCNC: 13.7 G/DL
IMM GRANULOCYTES # BLD AUTO: 0.05 X10(3) UL (ref 0–1)
IMM GRANULOCYTES NFR BLD: 0.6 %
LDLC SERPL CALC-MCNC: 79 MG/DL (ref ?–100)
LYMPHOCYTES # BLD AUTO: 1.63 X10(3) UL (ref 1–4)
LYMPHOCYTES NFR BLD AUTO: 20 %
MAGNESIUM SERPL-MCNC: 1.6 MG/DL (ref 1.6–2.6)
MCH RBC QN AUTO: 29 PG (ref 26–34)
MCHC RBC AUTO-ENTMCNC: 33.6 G/DL (ref 31–37)
MCV RBC AUTO: 86.4 FL
MONOCYTES # BLD AUTO: 0.58 X10(3) UL (ref 0.1–1)
MONOCYTES NFR BLD AUTO: 7.1 %
NEUTROPHILS # BLD AUTO: 5.24 X10 (3) UL (ref 1.5–7.7)
NEUTROPHILS # BLD AUTO: 5.24 X10(3) UL (ref 1.5–7.7)
NEUTROPHILS NFR BLD AUTO: 64.4 %
NONHDLC SERPL-MCNC: 105 MG/DL (ref ?–130)
OSMOLALITY SERPL CALC.SUM OF ELEC: 285 MOSM/KG (ref 275–295)
PHOSPHATE SERPL-MCNC: 3.1 MG/DL (ref 2.4–5.1)
PLATELET # BLD AUTO: 275 10(3)UL (ref 150–450)
POTASSIUM SERPL-SCNC: 4 MMOL/L (ref 3.5–5.1)
PROT SERPL-MCNC: 6.8 G/DL (ref 5.7–8.2)
PROT SERPL-MCNC: 6.8 G/DL (ref 5.7–8.2)
RBC # BLD AUTO: 4.72 X10(6)UL
SODIUM SERPL-SCNC: 138 MMOL/L (ref 136–145)
TRIGL SERPL-MCNC: 152 MG/DL (ref 30–149)
VLDLC SERPL CALC-MCNC: 24 MG/DL (ref 0–30)
WBC # BLD AUTO: 8.2 X10(3) UL (ref 4–11)

## 2024-11-04 PROCEDURE — 84100 ASSAY OF PHOSPHORUS: CPT

## 2024-11-04 PROCEDURE — 36415 COLL VENOUS BLD VENIPUNCTURE: CPT

## 2024-11-04 PROCEDURE — 80061 LIPID PANEL: CPT

## 2024-11-04 PROCEDURE — 82248 BILIRUBIN DIRECT: CPT

## 2024-11-04 PROCEDURE — 96402 CHEMO HORMON ANTINEOPL SQ/IM: CPT

## 2024-11-04 PROCEDURE — 80053 COMPREHEN METABOLIC PANEL: CPT

## 2024-11-04 PROCEDURE — 83735 ASSAY OF MAGNESIUM: CPT

## 2024-11-04 PROCEDURE — 85025 COMPLETE CBC W/AUTO DIFF WBC: CPT

## 2024-11-04 NOTE — PROGRESS NOTES
Education Record    Learner:  Patient    Disease / Diagnosis: Breast cancer    Barriers / Limitations:  None   Comments:    Method:  Brief focused and Reinforcement   Comments:    General Topics:  Plan of care reviewed   Comments:    Outcome:  Shows understanding   Comments:    Patient tolerated Zoladex injection and discharged in stable condition.

## 2024-11-13 DIAGNOSIS — F41.9 ANXIETY: ICD-10-CM

## 2024-11-13 RX ORDER — ALPRAZOLAM 0.25 MG/1
0.25 TABLET ORAL 2 TIMES DAILY PRN
Qty: 30 TABLET | Refills: 1 | Status: SHIPPED | OUTPATIENT
Start: 2024-11-13

## 2024-11-18 ENCOUNTER — APPOINTMENT (OUTPATIENT)
Dept: HEMATOLOGY/ONCOLOGY | Facility: HOSPITAL | Age: 49
End: 2024-11-18
Attending: SURGERY
Payer: COMMERCIAL

## 2024-11-22 ENCOUNTER — TELEPHONE (OUTPATIENT)
Dept: RADIATION ONCOLOGY | Facility: HOSPITAL | Age: 49
End: 2024-11-22

## 2024-12-02 ENCOUNTER — OFFICE VISIT (OUTPATIENT)
Dept: HEMATOLOGY/ONCOLOGY | Facility: HOSPITAL | Age: 49
End: 2024-12-02
Attending: SURGERY
Payer: COMMERCIAL

## 2024-12-02 VITALS
BODY MASS INDEX: 29.98 KG/M2 | TEMPERATURE: 97 F | OXYGEN SATURATION: 97 % | WEIGHT: 165 LBS | HEART RATE: 65 BPM | HEIGHT: 62.01 IN | DIASTOLIC BLOOD PRESSURE: 97 MMHG | RESPIRATION RATE: 14 BRPM | SYSTOLIC BLOOD PRESSURE: 141 MMHG

## 2024-12-02 DIAGNOSIS — C50.812 MALIGNANT NEOPLASM OF OVERLAPPING SITES OF LEFT BREAST IN FEMALE, ESTROGEN RECEPTOR POSITIVE (HCC): Primary | ICD-10-CM

## 2024-12-02 DIAGNOSIS — M54.50 LOW BACK PAIN AT MULTIPLE SITES: ICD-10-CM

## 2024-12-02 DIAGNOSIS — Z17.0 MALIGNANT NEOPLASM OF OVERLAPPING SITES OF LEFT BREAST IN FEMALE, ESTROGEN RECEPTOR POSITIVE (HCC): Primary | ICD-10-CM

## 2024-12-02 DIAGNOSIS — Z51.81 ENCOUNTER FOR MONITORING CARDIOTOXIC DRUG THERAPY: ICD-10-CM

## 2024-12-02 DIAGNOSIS — Z79.899 ENCOUNTER FOR MONITORING CARDIOTOXIC DRUG THERAPY: ICD-10-CM

## 2024-12-02 LAB
ALBUMIN SERPL-MCNC: 4.4 G/DL (ref 3.2–4.8)
ALBUMIN SERPL-MCNC: 4.4 G/DL (ref 3.2–4.8)
ALBUMIN/GLOB SERPL: 1.8 {RATIO} (ref 1–2)
ALP LIVER SERPL-CCNC: 72 U/L
ALP LIVER SERPL-CCNC: 72 U/L
ALT SERPL-CCNC: 15 U/L
ALT SERPL-CCNC: 15 U/L
ANION GAP SERPL CALC-SCNC: 10 MMOL/L (ref 0–18)
AST SERPL-CCNC: 18 U/L (ref ?–34)
AST SERPL-CCNC: 18 U/L (ref ?–34)
BASOPHILS # BLD AUTO: 0.05 X10(3) UL (ref 0–0.2)
BASOPHILS NFR BLD AUTO: 0.8 %
BILIRUB DIRECT SERPL-MCNC: 0.1 MG/DL (ref ?–0.3)
BILIRUB SERPL-MCNC: 0.5 MG/DL (ref 0.3–1.2)
BILIRUB SERPL-MCNC: 0.5 MG/DL (ref 0.3–1.2)
BUN BLD-MCNC: 12 MG/DL (ref 9–23)
CALCIUM BLD-MCNC: 9.5 MG/DL (ref 8.7–10.4)
CHLORIDE SERPL-SCNC: 106 MMOL/L (ref 98–112)
CO2 SERPL-SCNC: 25 MMOL/L (ref 21–32)
CREAT BLD-MCNC: 0.99 MG/DL
EGFRCR SERPLBLD CKD-EPI 2021: 71 ML/MIN/1.73M2 (ref 60–?)
EOSINOPHIL # BLD AUTO: 0.41 X10(3) UL (ref 0–0.7)
EOSINOPHIL NFR BLD AUTO: 6.5 %
ERYTHROCYTE [DISTWIDTH] IN BLOOD BY AUTOMATED COUNT: 14.7 %
GLOBULIN PLAS-MCNC: 2.4 G/DL (ref 2–3.5)
GLUCOSE BLD-MCNC: 89 MG/DL (ref 70–99)
HCT VFR BLD AUTO: 40.2 %
HGB BLD-MCNC: 13.9 G/DL
IMM GRANULOCYTES # BLD AUTO: 0.03 X10(3) UL (ref 0–1)
IMM GRANULOCYTES NFR BLD: 0.5 %
LYMPHOCYTES # BLD AUTO: 1.26 X10(3) UL (ref 1–4)
LYMPHOCYTES NFR BLD AUTO: 20.1 %
MAGNESIUM SERPL-MCNC: 1.7 MG/DL (ref 1.6–2.6)
MCH RBC QN AUTO: 29.8 PG (ref 26–34)
MCHC RBC AUTO-ENTMCNC: 34.6 G/DL (ref 31–37)
MCV RBC AUTO: 86.3 FL
MONOCYTES # BLD AUTO: 0.59 X10(3) UL (ref 0.1–1)
MONOCYTES NFR BLD AUTO: 9.4 %
NEUTROPHILS # BLD AUTO: 3.92 X10 (3) UL (ref 1.5–7.7)
NEUTROPHILS # BLD AUTO: 3.92 X10(3) UL (ref 1.5–7.7)
NEUTROPHILS NFR BLD AUTO: 62.7 %
OSMOLALITY SERPL CALC.SUM OF ELEC: 291 MOSM/KG (ref 275–295)
PHOSPHATE SERPL-MCNC: 4.1 MG/DL (ref 2.4–5.1)
PLATELET # BLD AUTO: 262 10(3)UL (ref 150–450)
POTASSIUM SERPL-SCNC: 4 MMOL/L (ref 3.5–5.1)
PROT SERPL-MCNC: 6.8 G/DL (ref 5.7–8.2)
PROT SERPL-MCNC: 6.8 G/DL (ref 5.7–8.2)
RBC # BLD AUTO: 4.66 X10(6)UL
SODIUM SERPL-SCNC: 141 MMOL/L (ref 136–145)
WBC # BLD AUTO: 6.3 X10(3) UL (ref 4–11)

## 2024-12-02 PROCEDURE — 99214 OFFICE O/P EST MOD 30 MIN: CPT | Performed by: INTERNAL MEDICINE

## 2024-12-02 PROCEDURE — 96402 CHEMO HORMON ANTINEOPL SQ/IM: CPT

## 2024-12-02 NOTE — PROGRESS NOTES
Patient here for zoladex injection and 1 month f/u. Patient states she has pain in her shoulders and upper back that she rates a 7/10 that started over a week ago. Patient states the pain prevented her from getting up from laying down to sitting up. Patient states she has not taken any medication for the pain.     Education Record    Learner:  Patient    Disease / Diagnosis: breast cancer    Barriers / Limitations:  None   Comments:    Method:  Discussion   Comments:    General Topics:  Medication, Side effects and symptom management, and Plan of care reviewed   Comments:    Outcome:  Shows understanding   Comments:

## 2024-12-02 NOTE — PROGRESS NOTES
Cancer Center Progress Note    Problem List:      Patient Active Problem List   Diagnosis    Fibroadenoma of left breast    Varicose vein    Arthritis    Malignant neoplasm of upper-outer quadrant of left breast in female, estrogen receptor positive (HCC)    Esophageal obstruction due to food impaction    Hematoma of left breast    Absence of breast, bilateral    Malignant neoplasm of overlapping sites of left breast in female, estrogen receptor positive (HCC)       Interim History:    Harleen Euceda presents today for evaluation and management of a diagnosis of left breast cancer.    Since last visit she has had bilateral low back pain. This has gradually increased with time. She has had pain at night over the last couple of days. She has no other pain. She has been on letrozole 2.5 mg daily. The pain started after starting the AI. She has no dyspnea or cough. She has no fever or sweats.    Since last visit she had a fourth cycle of TC chemotherapy in 7/2024. She completed radiation on 9/9/2024.       Oncology Hx:  She had bilateral mastectomy by Dr. Proctor. The right breast was benign. The left breast had a 90 mm area of invasive lobular carcinoma. The margins were negative. The SLN procedure had 0/3 nodes.She had a negative metastatic work up with CT of the CAP and bone scan.     She initially had a screening mammogram on 11/27/2023 that showed asymmetry in the left breast. She had diagnostic mammogram and US on 12/11/2023 that showed a 5 x 4 x 5 mm nodule. She had US biopsy on 12/22/2023 that showed invasive lobular carcinoma, grade II with %, %, Ki-67 12% and Her2 2+ IHC; FISH not amplified. She had an MRI of the breasts on 1/4/2024 that showed more extensive abnormality in the left breast with an 11 cm area of enhancement. There were no abnormal appearing axillary nodes.       Review of Systems:   Constitutional: Negative for anorexia, fatigue, fevers, chills, night sweats and weight loss.  Eyes:  Negative for visual disturbance, irritation and redness.  Respiratory: Negative for cough, hemoptysis, chest pain, or dyspnea.  Cardiovascular: Negative for angina, orthopnea or palpitations.  Gastrointestinal: Negative for nausea, vomiting, change in bowel habits, diarrhea, constipation and abdominal pain.  Integument/breast: Negative for rash, skin lesions, and pruritus.  Hematologic/lymphatic: Negative for easy bruising, bleeding, and lymphadenopathy.  Genitourinary: Negative for dysuria or hematuria  Neurological: Negative for headaches, dizziness, speech problems, gait problems and focal weakness.  Psychiatric: The patient's mood was calm and appropriate for this visit.  The pertinent positives and negatives were described. All other systems were negative.    PMH/PSH:  Past Medical History:    Anxiety state    Back problem    Breast cancer (HCC)    Esophageal obstruction due to food impaction    Fibroadenoma of left breast    Hx of motion sickness    PONV (postoperative nausea and vomiting)    with last  over sedated and o2 sat dropped    Varicose vein    Visual impairment       Past Surgical History:   Procedure Laterality Date      ,,    Extraction erupted tooth/exr      wisdom teeth    Insert intrauterine device      Mirena insertion    Mastectomy left  2024    Mastectomy right  2024    Other surgical history Bilateral 2018    RLTCS with bilateral Salpingectomies    Remove intrauterine device      Mirena removal    Skin surgery         Family History Reviewed:  Family History   Problem Relation Age of Onset    Hypertension Father     Diabetes Father     Heart Disorder Father 52        quad bypass    Other (hypothyroid) Father     Breast Cancer Mother 46    Hypertension Mother     Psychiatric Mother         depression    Breast Cancer Maternal Grandmother 63    Other (Cholangiocarinoma) Brother 36       Allergies:     Allergies   Allergen Reactions     Other SHORTNESS OF BREATH     Peonies and lemongrass.         Medications:   ALPRAZolam 0.25 MG Oral Tab Take 1 tablet (0.25 mg total) by mouth 2 (two) times daily as needed. 30 tablet 1    Ribociclib Succ, 400 MG Dose, 200 MG Oral Tablet Therapy Pack Take 400 mg by mouth daily. Take 2 tablets daily on day 1 through 21, based upon a 28 day cycle. 42 each 5    letrozole 2.5 MG Oral Tab Take 1 tablet (2.5 mg total) by mouth daily. 90 tablet 3         Vital Signs:      Height: 157.5 cm (5' 2.01\") (12/02 1023)  Weight: 74.8 kg (165 lb) (12/02 1023)  BSA (Calculated - sq m): 1.76 sq meters (12/02 1023)  Pulse: 65 (12/02 1023)  BP: 141/97 (12/02 1023)  Temp: 96.9 °F (36.1 °C) (12/02 1023)  Do Not Use - Resp Rate: --  SpO2: 97 % (12/02 1023)      Performance Status:  ECOG 0: Fully active, able to carry on all pre-disease performance without restriction     Physical Examination:    Constitutional: Patient is alert and oriented x 3, not in acute distress.  Respiratory: Clear to auscultation and percussion. No rales.  No wheezes.  Cardiovascular: Regular rate and rhythm. No murmurs.  Gastrointestinal: Soft, non tender with good bowel sounds.  Musculoskeletal: No pitting edema. No calf tenderness. Mild right ankle swelling with minimal tenderness on the lateral right ankle. No bone tenderness.  Skin: No suspicious skin lesion, no rash, no ulceration.  Lymphatics: There is no palpable lymphadenopathy throughout in the cervical, supraclavicular, or axillary regions.  Psychiatric: The patient's mood is calm and appropriate for this visit.       Labs reviewed at this visit:     Lab Results   Component Value Date    WBC 6.3 12/02/2024    RBC 4.66 12/02/2024    HGB 13.9 12/02/2024    HCT 40.2 12/02/2024    MCV 86.3 12/02/2024    MCH 29.8 12/02/2024    MCHC 34.6 12/02/2024    RDW 14.7 12/02/2024    .0 12/02/2024     Lab Results   Component Value Date     12/02/2024    K 4.0 12/02/2024     12/02/2024    CO2 25.0  12/02/2024    BUN 12 12/02/2024    CREATSERUM 0.99 12/02/2024    GLU 89 12/02/2024    CA 9.5 12/02/2024    ALKPHO 72 12/02/2024    ALKPHO 72 12/02/2024    ALT 15 12/02/2024    ALT 15 12/02/2024    AST 18 12/02/2024    AST 18 12/02/2024    BILT 0.5 12/02/2024    BILT 0.5 12/02/2024    ALB 4.4 12/02/2024    ALB 4.4 12/02/2024    TP 6.8 12/02/2024    TP 6.8 12/02/2024     Pathology from 3/20/2024:  Final Diagnosis:   A.  Excision, left sentinel lymph node #1:  -1 lymph node (0/1), negative for malignancy.     B.  Excision, left sentinel lymph node #2:  -1 lymph node (0/1), negative for malignancy.     C.  Excision, left sentinel lymph node #3:  -1 lymph node (0/1), negative for malignancy.     D.  Right mastectomy:  -Breast tissue with focal atypical lobular hyperplasia (ALH).  -Background breast tissue with fibrocystic changes including stromal fibrosis, usual ductal hyperplasia and cystic apocrine metaplasia.  -Unremarkable skin.  -Negative for malignancy.     E.  Left mastectomy:  -Invasive lobular carcinoma, grade 2, measuring approximately 90 mm (9 cm).        -Associated microcalcifications.  -Extensive lobular carcinoma in situ (LCIS).   -Invasive tumor is 3 mm (0.3 cm) from the closest deep margin of resection.  -No lymphovascular invasion is identified.   -Biopsy site changes are present.   -Unremarkable skin.   -Background breast tissue with fibrocystic changes including cystic apocrine metaplasia and fibroadenomatous changes.        Radiologic imaging reviewed at this visit:    Bone Scan on 1/15/2024:  FINDINGS:    IMAGED AREA:  Whole-body  ABNORMALITIES:  None.  OTHER:  Soft tissue nodule in the lateral aspect of the left breast is noted.  Degenerative changes in the shoulders and knees are noted.  There is scoliosis of the thoracolumbar spine..     Impression   CONCLUSION:    1. No evidence of osseous metastatic disease in the body.  2. Scoliosis of the thoracolumbar spine is noted.       CT CAP on  1/15/2024:  FINDINGS:       CHEST:    LUNGS:  No nodules or infiltrates.  No bronchiectasis.  MEDIASTINUM:  No adenopathy.  PENNY:  No adenopathy.  CARDIAC:  No pericardial effusion.  No significant coronary calcifications.  PLEURA:  No pleural effusion.  CHEST WALL:  There is a left axillary lymph node measuring 1.3 x 1.0 cm.  AORTA:  Normal caliber.  VASCULATURE:  Smooth tapering.     ABDOMEN/PELVIS:  LIVER:  Uniform parenchyma.  BILIARY:  Nondistended gallbladder.  No biliary dilatation.  PANCREAS:  Uniform parenchyma.  No ductal dilatation.  SPLEEN:  Not enlarged.  KIDNEYS:  Normal anatomic positions.  No hydronephrosis or perinephric stranding.  Uniform parenchyma.  ADRENALS:  Not enlarged.  AORTA:  Smooth tapering.  Patent celiac artery, SMA and KRISTI.  Patent splenic vein and portal vein.  RETROPERITONEUM:  No adenopathy.  BOWEL/MESENTERY:  Normal bowel caliber.  Moderate stool in the colon.  No ascites.  ABDOMINAL WALL:  No hernia.  URINARY BLADDER:  Nondistended.  PELVIC NODES:  None enlarged.  PELVIC ORGANS:  No uterine or ovarian abnormality.  BONES:  Moderate to severe degenerative changes lower cervical spine and L4-L5 facet joints.  Milder changes elsewhere.  Moderate apex left thoracolumbar scoliosis.  Normal vertebral body heights.     Impression   CONCLUSION:    1. Borderline enlarged left axillary lymph node.  2. No additional metastatic findings in the chest, abdomen or pelvis.  3. Details as above.          Assessment/Plan:     Invasive lobular carcinoma of the overlapping quadrants of the left breast:  Clincial T3 disease with 11 cm of MRI abnormality  Clinical N0 disease  Grade II  %  %  Ki-67 12%  Her2 2+ IHC  Her2 not amplied FISH  Bilateral Mastectomy on 3/20/2024:  Right breast benign  Left breast with 9 cm of ILC  SLN 0/3  TC x 4 completed in 6/2024  Started tamoxifen in 8/2024     The patient has a Luminal breast cancer with strong ER/MI positive and low grade and low Ki-67. She  has a very large breast cancer.     She has completed the radiation and hs has started tamoxifen. She will continue the OFS and endocrine therapy. She continues on the monthly goserelin. She is on Letrozole 2.5 mg daily. She has some low back pain that could be from the AI. I recommended that she first start with physical therapy. We will reassess this at the next visit in four weeks.    I recommended that she start adjuvant Ribociclib. She has a T3 breast cancer and is consider eligible for this therapy. She will get an EKG and labs in 2 weeks and four weeks. I will see her in four weeks.      Deejay May MD

## 2024-12-02 NOTE — PROGRESS NOTES
Education Record    Learner:  Patient    Pt here for: Zoladex injection    Barriers / Limitations:  None    Method:  Brief focused, printed material and  reinforcement    General Topics:  Plan of care reviewed    Outcome: Pt tolerated injection with no c/o. Appt  in 1 month for MD ANA LILIA, inj requested. Left in stable condition.

## 2024-12-16 ENCOUNTER — EKG ENCOUNTER (OUTPATIENT)
Dept: LAB | Age: 49
End: 2024-12-16
Attending: INTERNAL MEDICINE
Payer: COMMERCIAL

## 2024-12-16 DIAGNOSIS — Z51.81 ENCOUNTER FOR MONITORING CARDIOTOXIC DRUG THERAPY: ICD-10-CM

## 2024-12-16 DIAGNOSIS — Z79.899 ENCOUNTER FOR MONITORING CARDIOTOXIC DRUG THERAPY: ICD-10-CM

## 2024-12-16 DIAGNOSIS — C50.812 MALIGNANT NEOPLASM OF OVERLAPPING SITES OF LEFT BREAST IN FEMALE, ESTROGEN RECEPTOR POSITIVE (HCC): ICD-10-CM

## 2024-12-16 DIAGNOSIS — Z17.0 MALIGNANT NEOPLASM OF OVERLAPPING SITES OF LEFT BREAST IN FEMALE, ESTROGEN RECEPTOR POSITIVE (HCC): ICD-10-CM

## 2024-12-16 LAB
ALBUMIN SERPL-MCNC: 4.4 G/DL (ref 3.2–4.8)
ALBUMIN/GLOB SERPL: 1.8 {RATIO} (ref 1–2)
ALP LIVER SERPL-CCNC: 75 U/L
ALT SERPL-CCNC: 13 U/L
ANION GAP SERPL CALC-SCNC: 4 MMOL/L (ref 0–18)
AST SERPL-CCNC: 15 U/L (ref ?–34)
ATRIAL RATE: 62 BPM
BASOPHILS # BLD AUTO: 0.04 X10(3) UL (ref 0–0.2)
BASOPHILS NFR BLD AUTO: 0.9 %
BILIRUB SERPL-MCNC: 0.4 MG/DL (ref 0.3–1.2)
BUN BLD-MCNC: 14 MG/DL (ref 9–23)
CALCIUM BLD-MCNC: 9.9 MG/DL (ref 8.7–10.4)
CHLORIDE SERPL-SCNC: 108 MMOL/L (ref 98–112)
CO2 SERPL-SCNC: 28 MMOL/L (ref 21–32)
CREAT BLD-MCNC: 1.27 MG/DL
EGFRCR SERPLBLD CKD-EPI 2021: 52 ML/MIN/1.73M2 (ref 60–?)
EOSINOPHIL # BLD AUTO: 0.14 X10(3) UL (ref 0–0.7)
EOSINOPHIL NFR BLD AUTO: 3.2 %
ERYTHROCYTE [DISTWIDTH] IN BLOOD BY AUTOMATED COUNT: 14 %
FASTING STATUS PATIENT QL REPORTED: YES
GLOBULIN PLAS-MCNC: 2.4 G/DL (ref 2–3.5)
GLUCOSE BLD-MCNC: 92 MG/DL (ref 70–99)
HCT VFR BLD AUTO: 38.8 %
HGB BLD-MCNC: 13.3 G/DL
IMM GRANULOCYTES # BLD AUTO: 0.02 X10(3) UL (ref 0–1)
IMM GRANULOCYTES NFR BLD: 0.5 %
LYMPHOCYTES # BLD AUTO: 1.12 X10(3) UL (ref 1–4)
LYMPHOCYTES NFR BLD AUTO: 25.3 %
MAGNESIUM SERPL-MCNC: 1.7 MG/DL (ref 1.6–2.6)
MCH RBC QN AUTO: 30.9 PG (ref 26–34)
MCHC RBC AUTO-ENTMCNC: 34.3 G/DL (ref 31–37)
MCV RBC AUTO: 90 FL
MONOCYTES # BLD AUTO: 0.35 X10(3) UL (ref 0.1–1)
MONOCYTES NFR BLD AUTO: 7.9 %
NEUTROPHILS # BLD AUTO: 2.75 X10 (3) UL (ref 1.5–7.7)
NEUTROPHILS # BLD AUTO: 2.75 X10(3) UL (ref 1.5–7.7)
NEUTROPHILS NFR BLD AUTO: 62.2 %
OSMOLALITY SERPL CALC.SUM OF ELEC: 290 MOSM/KG (ref 275–295)
P AXIS: 50 DEGREES
P-R INTERVAL: 130 MS
PHOSPHATE SERPL-MCNC: 4.1 MG/DL (ref 2.4–5.1)
PLATELET # BLD AUTO: 300 10(3)UL (ref 150–450)
POTASSIUM SERPL-SCNC: 4.4 MMOL/L (ref 3.5–5.1)
PROT SERPL-MCNC: 6.8 G/DL (ref 5.7–8.2)
Q-T INTERVAL: 402 MS
QRS DURATION: 80 MS
QTC CALCULATION (BEZET): 408 MS
R AXIS: 41 DEGREES
RBC # BLD AUTO: 4.31 X10(6)UL
SODIUM SERPL-SCNC: 140 MMOL/L (ref 136–145)
T AXIS: 42 DEGREES
VENTRICULAR RATE: 62 BPM
WBC # BLD AUTO: 4.4 X10(3) UL (ref 4–11)

## 2024-12-16 PROCEDURE — 85025 COMPLETE CBC W/AUTO DIFF WBC: CPT

## 2024-12-16 PROCEDURE — 84100 ASSAY OF PHOSPHORUS: CPT

## 2024-12-16 PROCEDURE — 83735 ASSAY OF MAGNESIUM: CPT

## 2024-12-16 PROCEDURE — 80053 COMPREHEN METABOLIC PANEL: CPT

## 2024-12-16 PROCEDURE — 36415 COLL VENOUS BLD VENIPUNCTURE: CPT

## 2024-12-16 PROCEDURE — 93010 ELECTROCARDIOGRAM REPORT: CPT | Performed by: INTERNAL MEDICINE

## 2024-12-16 PROCEDURE — 93005 ELECTROCARDIOGRAM TRACING: CPT

## 2024-12-30 ENCOUNTER — APPOINTMENT (OUTPATIENT)
Age: 49
End: 2024-12-30
Attending: SURGERY
Payer: COMMERCIAL

## 2024-12-30 ENCOUNTER — OFFICE VISIT (OUTPATIENT)
Age: 49
End: 2024-12-30
Attending: SURGERY
Payer: COMMERCIAL

## 2024-12-30 VITALS
DIASTOLIC BLOOD PRESSURE: 89 MMHG | BODY MASS INDEX: 30.1 KG/M2 | HEIGHT: 62.01 IN | TEMPERATURE: 98 F | HEART RATE: 73 BPM | WEIGHT: 165.63 LBS | SYSTOLIC BLOOD PRESSURE: 146 MMHG | RESPIRATION RATE: 16 BRPM

## 2024-12-30 DIAGNOSIS — Z17.0 MALIGNANT NEOPLASM OF OVERLAPPING SITES OF LEFT BREAST IN FEMALE, ESTROGEN RECEPTOR POSITIVE (HCC): Primary | ICD-10-CM

## 2024-12-30 DIAGNOSIS — Z51.81 ENCOUNTER FOR MONITORING CARDIOTOXIC DRUG THERAPY: ICD-10-CM

## 2024-12-30 DIAGNOSIS — Z78.0 ASYMPTOMATIC MENOPAUSE: ICD-10-CM

## 2024-12-30 DIAGNOSIS — C50.812 MALIGNANT NEOPLASM OF OVERLAPPING SITES OF LEFT BREAST IN FEMALE, ESTROGEN RECEPTOR POSITIVE (HCC): Primary | ICD-10-CM

## 2024-12-30 DIAGNOSIS — Z79.899 ENCOUNTER FOR MONITORING CARDIOTOXIC DRUG THERAPY: ICD-10-CM

## 2024-12-30 LAB
ALBUMIN SERPL-MCNC: 4.2 G/DL (ref 3.2–4.8)
ALBUMIN/GLOB SERPL: 1.6 {RATIO} (ref 1–2)
ALP LIVER SERPL-CCNC: 86 U/L
ALT SERPL-CCNC: 13 U/L
ANION GAP SERPL CALC-SCNC: 9 MMOL/L (ref 0–18)
AST SERPL-CCNC: 17 U/L (ref ?–34)
BASOPHILS # BLD AUTO: 0.05 X10(3) UL (ref 0–0.2)
BASOPHILS NFR BLD AUTO: 1.3 %
BILIRUB SERPL-MCNC: 0.4 MG/DL (ref 0.3–1.2)
BUN BLD-MCNC: 11 MG/DL (ref 9–23)
CALCIUM BLD-MCNC: 9.5 MG/DL (ref 8.7–10.4)
CHLORIDE SERPL-SCNC: 108 MMOL/L (ref 98–112)
CO2 SERPL-SCNC: 24 MMOL/L (ref 21–32)
CREAT BLD-MCNC: 1.03 MG/DL
EGFRCR SERPLBLD CKD-EPI 2021: 67 ML/MIN/1.73M2 (ref 60–?)
EOSINOPHIL # BLD AUTO: 0.11 X10(3) UL (ref 0–0.7)
EOSINOPHIL NFR BLD AUTO: 3 %
ERYTHROCYTE [DISTWIDTH] IN BLOOD BY AUTOMATED COUNT: 15 %
GLOBULIN PLAS-MCNC: 2.6 G/DL (ref 2–3.5)
GLUCOSE BLD-MCNC: 84 MG/DL (ref 70–99)
HCT VFR BLD AUTO: 40.3 %
HGB BLD-MCNC: 13.6 G/DL
IMM GRANULOCYTES # BLD AUTO: 0.02 X10(3) UL (ref 0–1)
IMM GRANULOCYTES NFR BLD: 0.5 %
LYMPHOCYTES # BLD AUTO: 1.1 X10(3) UL (ref 1–4)
LYMPHOCYTES NFR BLD AUTO: 29.6 %
MCH RBC QN AUTO: 30.6 PG (ref 26–34)
MCHC RBC AUTO-ENTMCNC: 33.7 G/DL (ref 31–37)
MCV RBC AUTO: 90.8 FL
MONOCYTES # BLD AUTO: 0.39 X10(3) UL (ref 0.1–1)
MONOCYTES NFR BLD AUTO: 10.5 %
NEUTROPHILS # BLD AUTO: 2.04 X10 (3) UL (ref 1.5–7.7)
NEUTROPHILS # BLD AUTO: 2.04 X10(3) UL (ref 1.5–7.7)
NEUTROPHILS NFR BLD AUTO: 55.1 %
OSMOLALITY SERPL CALC.SUM OF ELEC: 291 MOSM/KG (ref 275–295)
PLATELET # BLD AUTO: 271 10(3)UL (ref 150–450)
POTASSIUM SERPL-SCNC: 4.2 MMOL/L (ref 3.5–5.1)
PROT SERPL-MCNC: 6.8 G/DL (ref 5.7–8.2)
RBC # BLD AUTO: 4.44 X10(6)UL
SODIUM SERPL-SCNC: 141 MMOL/L (ref 136–145)
WBC # BLD AUTO: 3.7 X10(3) UL (ref 4–11)

## 2024-12-30 NOTE — PROGRESS NOTES
Patient here for f/u for breast cancer and zoladex injection. Patient is taking Kisqali 400 mg daily 3 weeks on 1 week off. Patient on C2D1 today. Patient denies n/v/d. Patient taking letrozole with c/o night sweats. Patient states that she noticed bruising on her inner right thing that started last week and a bruise on her shoulder. Patient denies injury, dyspnea, chest pain or any bleeding issues.    Education Record    Learner:  Patient    Disease / Diagnosis: breast cancer    Barriers / Limitations:  None   Comments:    Method:  Discussion   Comments:    General Topics:  Medication, Pain, Side effects and symptom management, and Plan of care reviewed   Comments:    Outcome:  Shows understanding   Comments:

## 2024-12-30 NOTE — PROGRESS NOTES
Cancer Center Progress Note    Problem List:      Patient Active Problem List   Diagnosis    Fibroadenoma of left breast    Varicose vein    Arthritis    Malignant neoplasm of upper-outer quadrant of left breast in female, estrogen receptor positive (HCC)    Esophageal obstruction due to food impaction    Hematoma of left breast    Absence of breast, bilateral    Malignant neoplasm of overlapping sites of left breast in female, estrogen receptor positive (HCC)       Interim History:    Harleen Euceda presents today for evaluation and management of a diagnosis of left breast cancer.    Since last visit the patient has started ribociclib 400 mg daily 21/28. She has tolerated this well. She has no nausea. She has no fever or sweats. She has no dyspnea or cough. She has been on letrozole 2.5 mg daily.     She has completed four cycles of TC chemotherapy in 7/2024. She completed radiation on 9/9/2024.       Oncology Hx:  She had bilateral mastectomy by Dr. Proctor. The right breast was benign. The left breast had a 90 mm area of invasive lobular carcinoma. The margins were negative. The SLN procedure had 0/3 nodes.She had a negative metastatic work up with CT of the CAP and bone scan.     She initially had a screening mammogram on 11/27/2023 that showed asymmetry in the left breast. She had diagnostic mammogram and US on 12/11/2023 that showed a 5 x 4 x 5 mm nodule. She had US biopsy on 12/22/2023 that showed invasive lobular carcinoma, grade II with %, %, Ki-67 12% and Her2 2+ IHC; FISH not amplified. She had an MRI of the breasts on 1/4/2024 that showed more extensive abnormality in the left breast with an 11 cm area of enhancement. There were no abnormal appearing axillary nodes.       Review of Systems:   Constitutional: Negative for anorexia, fatigue, fevers, chills, night sweats and weight loss.  Eyes: Negative for visual disturbance, irritation and redness.  Respiratory: Negative for cough, hemoptysis,  chest pain, or dyspnea.  Cardiovascular: Negative for angina, orthopnea or palpitations.  Gastrointestinal: Negative for nausea, vomiting, change in bowel habits, diarrhea, constipation and abdominal pain.  Integument/breast: Negative for rash, skin lesions, and pruritus.  Hematologic/lymphatic: Negative for easy bruising, bleeding, and lymphadenopathy.  Genitourinary: Negative for dysuria or hematuria  Neurological: Negative for headaches, dizziness, speech problems, gait problems and focal weakness.  Psychiatric: The patient's mood was calm and appropriate for this visit.  The pertinent positives and negatives were described. All other systems were negative.    PMH/PSH:  Past Medical History:    Anxiety state    Back problem    Breast cancer (HCC)    Esophageal obstruction due to food impaction    Fibroadenoma of left breast    Hx of motion sickness    PONV (postoperative nausea and vomiting)    with last  over sedated and o2 sat dropped    Varicose vein    Visual impairment       Past Surgical History:   Procedure Laterality Date      ,,    Extraction erupted tooth/exr      wisdom teeth    Insert intrauterine device      Mirena insertion    Mastectomy left  2024    Mastectomy right  2024    Other surgical history Bilateral 2018    RLTCS with bilateral Salpingectomies    Remove intrauterine device      Mirena removal    Skin surgery         Family History Reviewed:  Family History   Problem Relation Age of Onset    Hypertension Father     Diabetes Father     Heart Disorder Father 52        quad bypass    Other (hypothyroid) Father     Breast Cancer Mother 46    Hypertension Mother     Psychiatric Mother         depression    Breast Cancer Maternal Grandmother 63    Other (Cholangiocarinoma) Brother 36       Allergies:     Allergies   Allergen Reactions    Other SHORTNESS OF BREATH     Peonies and lemongrass.         Medications:   ALPRAZolam 0.25 MG Oral Tab  Take 1 tablet (0.25 mg total) by mouth 2 (two) times daily as needed. 30 tablet 1    Ribociclib Succ, 400 MG Dose, 200 MG Oral Tablet Therapy Pack Take 400 mg by mouth daily. Take 2 tablets daily on day 1 through 21, based upon a 28 day cycle. 42 each 5    letrozole 2.5 MG Oral Tab Take 1 tablet (2.5 mg total) by mouth daily. 90 tablet 3         Vital Signs:      Height: 157.5 cm (5' 2.01\") (12/30 0953)  Weight: 75.1 kg (165 lb 9.6 oz) (12/30 0953)  BSA (Calculated - sq m): 1.76 sq meters (12/30 0953)  Pulse: 73 (12/30 0953)  BP: 146/89 (12/30 0953)  Temp: 98 °F (36.7 °C) (12/30 0953)  Do Not Use - Resp Rate: --  SpO2: --      Performance Status:  ECOG 0: Fully active, able to carry on all pre-disease performance without restriction     Physical Examination:    Constitutional: Patient is alert and oriented x 3, not in acute distress.  Respiratory: Clear to auscultation and percussion. No rales.  No wheezes.  Cardiovascular: Regular rate and rhythm. No murmurs.  Gastrointestinal: Soft, non tender with good bowel sounds.  Musculoskeletal: No pitting edema. No calf tenderness. Mild right ankle swelling with minimal tenderness on the lateral right ankle. No bone tenderness.  Skin: No suspicious skin lesion, no rash, no ulceration.  Lymphatics: There is no palpable lymphadenopathy throughout in the cervical, supraclavicular, or axillary regions.  Psychiatric: The patient's mood is calm and appropriate for this visit.       Labs reviewed at this visit:     Lab Results   Component Value Date    WBC 3.7 (L) 12/30/2024    RBC 4.44 12/30/2024    HGB 13.6 12/30/2024    HCT 40.3 12/30/2024    MCV 90.8 12/30/2024    MCH 30.6 12/30/2024    MCHC 33.7 12/30/2024    RDW 15.0 12/30/2024    .0 12/30/2024     Lab Results   Component Value Date     12/30/2024    K 4.2 12/30/2024     12/30/2024    CO2 24.0 12/30/2024    BUN 11 12/30/2024    CREATSERUM 1.03 (H) 12/30/2024    GLU 84 12/30/2024    CA 9.5 12/30/2024     ALKPHO 86 12/30/2024    ALT 13 12/30/2024    AST 17 12/30/2024    BILT 0.4 12/30/2024    ALB 4.2 12/30/2024    TP 6.8 12/30/2024     Pathology from 3/20/2024:  Final Diagnosis:   A.  Excision, left sentinel lymph node #1:  -1 lymph node (0/1), negative for malignancy.     B.  Excision, left sentinel lymph node #2:  -1 lymph node (0/1), negative for malignancy.     C.  Excision, left sentinel lymph node #3:  -1 lymph node (0/1), negative for malignancy.     D.  Right mastectomy:  -Breast tissue with focal atypical lobular hyperplasia (ALH).  -Background breast tissue with fibrocystic changes including stromal fibrosis, usual ductal hyperplasia and cystic apocrine metaplasia.  -Unremarkable skin.  -Negative for malignancy.     E.  Left mastectomy:  -Invasive lobular carcinoma, grade 2, measuring approximately 90 mm (9 cm).        -Associated microcalcifications.  -Extensive lobular carcinoma in situ (LCIS).   -Invasive tumor is 3 mm (0.3 cm) from the closest deep margin of resection.  -No lymphovascular invasion is identified.   -Biopsy site changes are present.   -Unremarkable skin.   -Background breast tissue with fibrocystic changes including cystic apocrine metaplasia and fibroadenomatous changes.        Radiologic imaging reviewed at this visit:    Bone Scan on 1/15/2024:  FINDINGS:    IMAGED AREA:  Whole-body  ABNORMALITIES:  None.  OTHER:  Soft tissue nodule in the lateral aspect of the left breast is noted.  Degenerative changes in the shoulders and knees are noted.  There is scoliosis of the thoracolumbar spine..     Impression   CONCLUSION:    1. No evidence of osseous metastatic disease in the body.  2. Scoliosis of the thoracolumbar spine is noted.       CT CAP on 1/15/2024:  FINDINGS:       CHEST:    LUNGS:  No nodules or infiltrates.  No bronchiectasis.  MEDIASTINUM:  No adenopathy.  PENNY:  No adenopathy.  CARDIAC:  No pericardial effusion.  No significant coronary calcifications.  PLEURA:  No pleural  effusion.  CHEST WALL:  There is a left axillary lymph node measuring 1.3 x 1.0 cm.  AORTA:  Normal caliber.  VASCULATURE:  Smooth tapering.     ABDOMEN/PELVIS:  LIVER:  Uniform parenchyma.  BILIARY:  Nondistended gallbladder.  No biliary dilatation.  PANCREAS:  Uniform parenchyma.  No ductal dilatation.  SPLEEN:  Not enlarged.  KIDNEYS:  Normal anatomic positions.  No hydronephrosis or perinephric stranding.  Uniform parenchyma.  ADRENALS:  Not enlarged.  AORTA:  Smooth tapering.  Patent celiac artery, SMA and KRISTI.  Patent splenic vein and portal vein.  RETROPERITONEUM:  No adenopathy.  BOWEL/MESENTERY:  Normal bowel caliber.  Moderate stool in the colon.  No ascites.  ABDOMINAL WALL:  No hernia.  URINARY BLADDER:  Nondistended.  PELVIC NODES:  None enlarged.  PELVIC ORGANS:  No uterine or ovarian abnormality.  BONES:  Moderate to severe degenerative changes lower cervical spine and L4-L5 facet joints.  Milder changes elsewhere.  Moderate apex left thoracolumbar scoliosis.  Normal vertebral body heights.     Impression   CONCLUSION:    1. Borderline enlarged left axillary lymph node.  2. No additional metastatic findings in the chest, abdomen or pelvis.  3. Details as above.          Assessment/Plan:     Invasive lobular carcinoma of the overlapping quadrants of the left breast:  Clincial T3 disease with 11 cm of MRI abnormality  Clinical N0 disease  Grade II  %  %  Ki-67 12%  Her2 2+ IHC  Her2 not amplied FISH  Bilateral Mastectomy on 3/20/2024:  Right breast benign  Left breast with 9 cm of ILC  SLN 0/3  TC x 4 completed in 6/2024  Started tamoxifen in 8/2024     The patient has a Luminal breast cancer with strong ER/VT positive and low grade and low Ki-67. She has a very large breast cancer.     She continues on the OFS and endocrine therapy with letrozole 2.5 mg daily. She is getting monthly goserelin. She will continue with the second month of ribociclib. Her EKG had no QT interval changes. She  will has normal ANC. I will have her repeat a CBC in four weeks and then she will return to see me in 8 weeks with repeat labs. She was comfortable with this plan. We will also follow her chemistry panel and LFT's.      Deejay May MD

## 2025-01-24 ENCOUNTER — HOSPITAL ENCOUNTER (OUTPATIENT)
Dept: BONE DENSITY | Age: 50
Discharge: HOME | End: 2025-01-24
Attending: INTERNAL MEDICINE
Payer: COMMERCIAL

## 2025-01-24 DIAGNOSIS — Z17.0 MALIGNANT NEOPLASM OF OVERLAPPING SITES OF LEFT BREAST IN FEMALE, ESTROGEN RECEPTOR POSITIVE (HCC): ICD-10-CM

## 2025-01-24 DIAGNOSIS — Z78.0 ASYMPTOMATIC MENOPAUSE: ICD-10-CM

## 2025-01-24 DIAGNOSIS — C50.812 MALIGNANT NEOPLASM OF OVERLAPPING SITES OF LEFT BREAST IN FEMALE, ESTROGEN RECEPTOR POSITIVE (HCC): ICD-10-CM

## 2025-01-24 PROCEDURE — 77080 DXA BONE DENSITY AXIAL: CPT | Performed by: INTERNAL MEDICINE

## 2025-01-27 ENCOUNTER — OFFICE VISIT (OUTPATIENT)
Age: 50
End: 2025-01-27
Attending: SURGERY
Payer: COMMERCIAL

## 2025-01-27 VITALS
WEIGHT: 166 LBS | TEMPERATURE: 97 F | HEIGHT: 62.01 IN | OXYGEN SATURATION: 100 % | SYSTOLIC BLOOD PRESSURE: 149 MMHG | DIASTOLIC BLOOD PRESSURE: 98 MMHG | BODY MASS INDEX: 30.16 KG/M2 | HEART RATE: 72 BPM | RESPIRATION RATE: 16 BRPM

## 2025-01-27 DIAGNOSIS — C50.812 MALIGNANT NEOPLASM OF OVERLAPPING SITES OF LEFT BREAST IN FEMALE, ESTROGEN RECEPTOR POSITIVE (HCC): Primary | ICD-10-CM

## 2025-01-27 DIAGNOSIS — Z79.899 ENCOUNTER FOR MONITORING CARDIOTOXIC DRUG THERAPY: ICD-10-CM

## 2025-01-27 DIAGNOSIS — Z17.0 MALIGNANT NEOPLASM OF OVERLAPPING SITES OF LEFT BREAST IN FEMALE, ESTROGEN RECEPTOR POSITIVE (HCC): Primary | ICD-10-CM

## 2025-01-27 DIAGNOSIS — Z51.81 ENCOUNTER FOR MONITORING CARDIOTOXIC DRUG THERAPY: ICD-10-CM

## 2025-01-27 LAB
BASOPHILS # BLD AUTO: 0.08 X10(3) UL (ref 0–0.2)
BASOPHILS NFR BLD AUTO: 1.7 %
EOSINOPHIL # BLD AUTO: 0.21 X10(3) UL (ref 0–0.7)
EOSINOPHIL NFR BLD AUTO: 4.6 %
ERYTHROCYTE [DISTWIDTH] IN BLOOD BY AUTOMATED COUNT: 15.5 %
HCT VFR BLD AUTO: 40.7 %
HGB BLD-MCNC: 14 G/DL
IMM GRANULOCYTES # BLD AUTO: 0.02 X10(3) UL (ref 0–1)
IMM GRANULOCYTES NFR BLD: 0.4 %
LYMPHOCYTES # BLD AUTO: 1.28 X10(3) UL (ref 1–4)
LYMPHOCYTES NFR BLD AUTO: 27.8 %
MCH RBC QN AUTO: 32.6 PG (ref 26–34)
MCHC RBC AUTO-ENTMCNC: 34.4 G/DL (ref 31–37)
MCV RBC AUTO: 94.7 FL
MONOCYTES # BLD AUTO: 0.66 X10(3) UL (ref 0.1–1)
MONOCYTES NFR BLD AUTO: 14.3 %
NEUTROPHILS # BLD AUTO: 2.35 X10 (3) UL (ref 1.5–7.7)
NEUTROPHILS # BLD AUTO: 2.35 X10(3) UL (ref 1.5–7.7)
NEUTROPHILS NFR BLD AUTO: 51.2 %
PLATELET # BLD AUTO: 253 10(3)UL (ref 150–450)
RBC # BLD AUTO: 4.3 X10(6)UL
WBC # BLD AUTO: 4.6 X10(3) UL (ref 4–11)

## 2025-01-27 NOTE — PROGRESS NOTES
Pt arrived amb for Zoladex inj.  Given per MD order.  Pt nilesh well.  Future appointment scheduled.  Pt left amb without c/o.

## 2025-01-30 NOTE — PROGRESS NOTES
Harleen Euceda is a 49 year old female who presents today in follow-up after undergoing bilateral mastectomy and Wise pattern skin envelope reduction in March 2024.  She completed postmastectomy radiation therapy to the left chest wall in September. She complains of volume deficiency at the central aspect of her breast.    Physical Examination:  Breasts: Breast show well-healed Wise pattern scars.  The left breast is noted to have moderate hyperpigmentation and fibrosis consistent with radiation change.  Significant contour abnormalities noted at the central aspect I will suggest  of bilateral breast.  Abdomen: Moderate upper abdominal and flank lipodystrophy is noted.  A reducible 1 cm umbilical hernia is noted.    Assessment and Plan:  Treatment options reviewed with the patient.  These include expectant management, fat grafting, or conversion to autologous reconstruction.  The patient wishes to proceed with fat grafting to bilateral reconstructed breast.  The nature of the seizure was reviewed.  Given the degree of contour abnormality, we discussed the likely need for multiple rounds of fat grafting.  We discussed the risk of surgery including but not limited to bleeding, infection, scarring, delayed wound healing, injury to intra-abdominal structures, cysts or calcifications requiring biopsy, and need for further surgery.  We discussed the expected postoperative course including need for activity limitation and compression.  Multiple questions were answered to the patient's satisfaction.  No guarantees as to outcome were offered.  The patient expresses understanding and wishes to proceed. A total of 20 minutes was spent reviewing the patient's history and diagnostic testing, examining the patient, reviewing reconstructive options, and coordinating the patient's care.

## 2025-01-31 ENCOUNTER — OFFICE VISIT (OUTPATIENT)
Dept: SURGERY | Facility: CLINIC | Age: 50
End: 2025-01-31
Payer: COMMERCIAL

## 2025-01-31 DIAGNOSIS — Z90.13 ABSENCE OF BREAST, BILATERAL: Primary | ICD-10-CM

## 2025-01-31 PROCEDURE — 99213 OFFICE O/P EST LOW 20 MIN: CPT | Performed by: SURGERY

## 2025-01-31 NOTE — PATIENT INSTRUCTIONS
Surgeon:         Dr. Brandon Alonzo                                        Tel:         [phone]                                  Fax:        [phone]    Surgery/Procedure: Autologous fat grafting to the bilateral reconstructed breasts, 1.5 hours, general anesthesia, outpatient     Dx Code: Z90.13    Hospital:  [facility]: [address] [place] [address]           [phone]  [facility]: [address] [place] [address]               [phone]    1. Someone will need to drive you to and from the hospital if your procedure is outpatient.    2.Do not drink alcohol or smoke 24 hours prior to your procedure.    3. Bring a picture ID and your insurance card.    4. You will be contacted by the hospital the day before to confirm the procedure time and location.     5. Do not take any herbal supplements or blood thinners at least one week before your procedure/surgery. This includes NSAID's (aspirin, baby aspirin, Motrin, Ibuprofen, Aleve, Advil, Naproxen, etc), Fish oil, vitamin E, turmeric, CoQ10, or green tea supplements, etc. *TYLENOL or acetaminophen is ok to take*    6. PRE-OPERATIVE TESTING: History and physical with medical clearance is REQUIRED within 30 days of the surgery date and is mandatory per Dr. Alonzo. *If this is not done, your surgery will be postponed*  MEDICAL CLEARANCE WITH DR. Jones  CBC  CMP  EKG (within 90 days)    7. If you take Coumadin, Plavix, Xarelto, or Eliquis, please contact your prescribing physician for special instructions on how long to hold. If you take insulin, contact your primary care physician for special instructions.     8. Please inform us if you start or change any medications at least one week before surgery (ex: blood thinners, weight loss medications, diabetic medications, herbal supplements, etc)    9. Does patient have diagnosis of sleep apnea?    [   ] Yes     [  x ]  No    Consent obtained  Photos taken on 1/31/2025

## 2025-02-03 ENCOUNTER — TELEPHONE (OUTPATIENT)
Dept: SURGERY | Facility: CLINIC | Age: 50
End: 2025-02-03

## 2025-02-03 DIAGNOSIS — Z90.13 ABSENCE OF BREAST, BILATERAL: Primary | ICD-10-CM

## 2025-02-03 NOTE — TELEPHONE ENCOUNTER
Calling pt in regards to scheduling surgery.  Informed pt that I have 10/01/2025 available at [facility] with Dr. Alonzo.  Pt verbalized understanding and in agreement with date and location.  All questions answered.   Encouraged pt to call or [employer] message office with any other questions or concerns.

## 2025-02-07 NOTE — PROGRESS NOTES
Nursing Follow-Up Note    Patient: Harleen Euceda  YOB: 1975  Age: 49 year old  Radiation Oncologist: Dr. Alise Davis  Referring Physician: No ref. provider found  Chief Complaint:   Chief Complaint   Patient presents with    Follow - Up     Date: 2/7/2025    Toxicities: n/a    Vital Signs: BP (!) 126/93 (BP Location: Right arm, Patient Position: Sitting, Cuff Size: adult)   Pulse 86   Temp 97.4 °F (36.3 °C) (Tympanic)   Resp 18   Wt 73.1 kg (161 lb 3.2 oz)   LMP 02/04/2024 (Exact Date)   SpO2 98%   BMI 29.48 kg/m² ,   Wt Readings from Last 6 Encounters:   02/10/25 73.1 kg (161 lb 3.2 oz)   01/27/25 75.3 kg (166 lb)   12/30/24 75.1 kg (165 lb 9.6 oz)   12/02/24 74.8 kg (165 lb)   11/04/24 74.9 kg (165 lb 3.2 oz)   10/21/24 74.4 kg (164 lb)       Allergies:  Allergies[1]    Nursing Note: Hx of ILC of L breast, grade 2, ER/%, Ki67 12%, HER2 2+, S/p bilateral mastectomy 3/20/24, R breast benign, L breast with 9cm of ILC, SLN 0/3. Completed adjuvant chemo 6/2024. Started tamoxifen August 2024. Completed RT to L CW 9/9/24.Then began ribociclib. Here for follow up today. Pt states she had difficulty with ROM of L arm in Dec. 2024. Pt did exercises at home with relief. Still feels \"tight\" to L CW area. Reports skin healed well. Reports some swelling to upper outer area of L CW. Continues on letrozole. Last Zoladex 1/27/25. Plan for reconstruction surgery in Oct.            [1]   Allergies  Allergen Reactions    Other SHORTNESS OF BREATH     Peonies and lemongrass.

## 2025-02-10 ENCOUNTER — HOSPITAL ENCOUNTER (OUTPATIENT)
Dept: RADIATION ONCOLOGY | Facility: HOSPITAL | Age: 50
End: 2025-02-10
Attending: RADIOLOGY
Payer: COMMERCIAL

## 2025-02-10 VITALS
RESPIRATION RATE: 18 BRPM | TEMPERATURE: 97 F | OXYGEN SATURATION: 98 % | BODY MASS INDEX: 29 KG/M2 | WEIGHT: 161.19 LBS | SYSTOLIC BLOOD PRESSURE: 126 MMHG | HEART RATE: 86 BPM | DIASTOLIC BLOOD PRESSURE: 93 MMHG

## 2025-02-10 DIAGNOSIS — Z17.0 MALIGNANT NEOPLASM OF OVERLAPPING SITES OF LEFT BREAST IN FEMALE, ESTROGEN RECEPTOR POSITIVE (HCC): Primary | ICD-10-CM

## 2025-02-10 DIAGNOSIS — C50.812 MALIGNANT NEOPLASM OF OVERLAPPING SITES OF LEFT BREAST IN FEMALE, ESTROGEN RECEPTOR POSITIVE (HCC): Primary | ICD-10-CM

## 2025-02-10 PROCEDURE — 99213 OFFICE O/P EST LOW 20 MIN: CPT

## 2025-02-10 NOTE — PROGRESS NOTES
[facility]  RADIATION ONCOLOGY   FOLLOW UP     Harleen Euceda  9/30/1975    DIAGNOSIS: Left breast cancer     CANCER HISTORY   -left mastectomy with SNbx for 9 cm grade 2 ILC, no LVI, margin negative, nodes negative,   Ki 12%, Her2 2+/FISH neg.  -TC x 4  -Left chest wall only RT (node negative, favorable biology except size), 50.4 Gy, 28 fractions, 9/2024    INTERIM HISTORY   Doing well. A little tightness of arm/chest. No hand edema. ROM ok. Could be more diligent about arm exercises. No pain meds. Plans fat grafting rather than implant or flap.     EXAM   No adenopathy  No suspicious skin/chest wall nodules  Some pigmentation in RT field  No hand edema    IMPRESSION/PLAN   5 months out from adjuvant chest wall only RT  RHINA on systemic Rx  Discussed PT, will contact me if she wants to pursue, ok right now  Plan fat grafts rather than implant or flap    F/u 1 year    Alise Davis MD  Radiation Oncology    15 minutes were spent with the patient, more than 50 percent on counseling/coordination of care (discuss disease status, management of any side effects, future follow up plans)

## 2025-02-10 NOTE — PATIENT INSTRUCTIONS
- WE WILL CALL TO SCHEDULE YOUR FOLLOW-UP APPOINTMENT WITH DR. DAVID IN 1 YEAR        - CALL THE NURSING STATION AT [phone] IF YOU HAVE ANY QUESTIONS/CONCERNS REGARDING RADIATION THERAPY

## 2025-02-24 ENCOUNTER — OFFICE VISIT (OUTPATIENT)
Age: 50
End: 2025-02-24
Attending: SURGERY
Payer: COMMERCIAL

## 2025-02-24 VITALS
TEMPERATURE: 97 F | DIASTOLIC BLOOD PRESSURE: 97 MMHG | HEART RATE: 70 BPM | BODY MASS INDEX: 30 KG/M2 | OXYGEN SATURATION: 99 % | RESPIRATION RATE: 16 BRPM | WEIGHT: 164.63 LBS | SYSTOLIC BLOOD PRESSURE: 142 MMHG

## 2025-02-24 DIAGNOSIS — Z79.899 ENCOUNTER FOR MONITORING CARDIOTOXIC DRUG THERAPY: ICD-10-CM

## 2025-02-24 DIAGNOSIS — Z17.0 MALIGNANT NEOPLASM OF OVERLAPPING SITES OF LEFT BREAST IN FEMALE, ESTROGEN RECEPTOR POSITIVE (HCC): Primary | ICD-10-CM

## 2025-02-24 DIAGNOSIS — C50.812 MALIGNANT NEOPLASM OF OVERLAPPING SITES OF LEFT BREAST IN FEMALE, ESTROGEN RECEPTOR POSITIVE (HCC): Primary | ICD-10-CM

## 2025-02-24 DIAGNOSIS — H53.9 VISION CHANGES: ICD-10-CM

## 2025-02-24 DIAGNOSIS — Z51.81 ENCOUNTER FOR MONITORING CARDIOTOXIC DRUG THERAPY: ICD-10-CM

## 2025-02-24 DIAGNOSIS — R23.2 HOT FLASHES: ICD-10-CM

## 2025-02-24 LAB
ALBUMIN SERPL-MCNC: 4.6 G/DL (ref 3.2–4.8)
ALBUMIN/GLOB SERPL: 1.7 {RATIO} (ref 1–2)
ALP LIVER SERPL-CCNC: 86 U/L
ALT SERPL-CCNC: 12 U/L
ANION GAP SERPL CALC-SCNC: 6 MMOL/L (ref 0–18)
AST SERPL-CCNC: 15 U/L (ref ?–34)
BASOPHILS # BLD AUTO: 0.07 X10(3) UL (ref 0–0.2)
BASOPHILS NFR BLD AUTO: 1.3 %
BILIRUB SERPL-MCNC: 0.3 MG/DL (ref 0.3–1.2)
BUN BLD-MCNC: 11 MG/DL (ref 9–23)
CALCIUM BLD-MCNC: 9.5 MG/DL (ref 8.7–10.6)
CHLORIDE SERPL-SCNC: 104 MMOL/L (ref 98–112)
CO2 SERPL-SCNC: 28 MMOL/L (ref 21–32)
CREAT BLD-MCNC: 1.14 MG/DL
EGFRCR SERPLBLD CKD-EPI 2021: 60 ML/MIN/1.73M2 (ref 60–?)
EOSINOPHIL # BLD AUTO: 0.24 X10(3) UL (ref 0–0.7)
EOSINOPHIL NFR BLD AUTO: 4.6 %
ERYTHROCYTE [DISTWIDTH] IN BLOOD BY AUTOMATED COUNT: 14.3 %
FASTING STATUS PATIENT QL REPORTED: NO
GLOBULIN PLAS-MCNC: 2.7 G/DL (ref 2–3.5)
GLUCOSE BLD-MCNC: 98 MG/DL (ref 70–99)
HCT VFR BLD AUTO: 39.6 %
HGB BLD-MCNC: 14.1 G/DL
IMM GRANULOCYTES # BLD AUTO: 0.02 X10(3) UL (ref 0–1)
IMM GRANULOCYTES NFR BLD: 0.4 %
LDH SERPL L TO P-CCNC: 195 U/L
LYMPHOCYTES # BLD AUTO: 1.27 X10(3) UL (ref 1–4)
LYMPHOCYTES NFR BLD AUTO: 24.4 %
MAGNESIUM SERPL-MCNC: 1.8 MG/DL (ref 1.6–2.6)
MCH RBC QN AUTO: 33.3 PG (ref 26–34)
MCHC RBC AUTO-ENTMCNC: 35.6 G/DL (ref 31–37)
MCV RBC AUTO: 93.6 FL
MONOCYTES # BLD AUTO: 0.64 X10(3) UL (ref 0.1–1)
MONOCYTES NFR BLD AUTO: 12.3 %
NEUTROPHILS # BLD AUTO: 2.96 X10 (3) UL (ref 1.5–7.7)
NEUTROPHILS # BLD AUTO: 2.96 X10(3) UL (ref 1.5–7.7)
NEUTROPHILS NFR BLD AUTO: 57 %
OSMOLALITY SERPL CALC.SUM OF ELEC: 285 MOSM/KG (ref 275–295)
PHOSPHATE SERPL-MCNC: 3.6 MG/DL (ref 2.4–5.1)
PLATELET # BLD AUTO: 240 10(3)UL (ref 150–450)
POTASSIUM SERPL-SCNC: 3.7 MMOL/L (ref 3.5–5.1)
PROT SERPL-MCNC: 7.3 G/DL (ref 5.7–8.2)
RBC # BLD AUTO: 4.23 X10(6)UL
SODIUM SERPL-SCNC: 138 MMOL/L (ref 136–145)
WBC # BLD AUTO: 5.2 X10(3) UL (ref 4–11)

## 2025-02-24 NOTE — PROGRESS NOTES
Education Record    Learner:  Patient    Disease / Diagnosis: breast ca    Barriers / Limitations:  None   Comments:    Method:  Discussion   Comments:    General Topics:  Medication, Side effects and symptom management, Plan of care reviewed, and Fall risk and prevention   Comments:    Outcome:  Shows understanding   Comments:    Zoladex inj administered per order. Tolerated well. Next appts scheduled - pt states she will view on MyChart. Discharged ambulatory in stable condition.

## 2025-02-24 NOTE — PROGRESS NOTES
Cancer Center Progress Note    Problem List:      Patient Active Problem List   Diagnosis    Fibroadenoma of left breast    Varicose vein    Arthritis    Malignant neoplasm of upper-outer quadrant of left breast in female, estrogen receptor positive (HCC)    Esophageal obstruction due to food impaction    Hematoma of left breast    Absence of breast, bilateral    Malignant neoplasm of overlapping sites of left breast in female, estrogen receptor positive (HCC)         History of Present Illness  Harleen Euceda is a 49 year old female with left breast cancer who presents for continued management.    She is currently undergoing treatment for left breast cancer. Her regimen includes ribociclib 400 mg, administered three weeks on and one week off, letrozole 2.5 mg daily, and monthly goserelin injections for ovarian function suppression. She completed four cycles of Taxotere and cyclophosphamide in July 2024, followed by radiation therapy in September 2024. A bilateral mastectomy was performed in March 2024 for a 9 cm invasive lobular cancer with three negative sentinel lymph nodes. Initially started on tamoxifen in August 2024, she was switched to her current regimen following ovarian function suppression.    She experiences regular hot flashes and night sweats, occurring more than once daily, which occasionally disturb her sleep. She is unsure if these symptoms are the direct cause of her waking. She occasionally takes Xanax before bed if she has had several nights of poor sleep, which she finds helpful.    She experiences intermittent pain, particularly when on her feet for extended periods, but it does not significantly impact her daily life. She is not taking any medication for pain relief, such as ibuprofen.    She reports a recent decline in her eyesight over the past month to month and a half, although she has not yet scheduled a vision exam.      Review of Systems:   Constitutional: Negative for anorexia,  fatigue, fevers, chills, night sweats and weight loss.  Eyes: Negative for visual disturbance, irritation and redness.  Respiratory: Negative for cough, hemoptysis, chest pain, or dyspnea.  Cardiovascular: Negative for angina, orthopnea or palpitations.  Gastrointestinal: Negative for nausea, vomiting, change in bowel habits, diarrhea, constipation and abdominal pain.  Integument/breast: Negative for rash, skin lesions, and pruritus.  Hematologic/lymphatic: Negative for easy bruising, bleeding, and lymphadenopathy.  Musculoskeletal: Negative for myalgias, arthralgias, muscle weakness.  Genitourinary: Negative for dysuria or hematuria  Neurological: Negative for headaches, dizziness, speech problems, gait problems and focal weakness.  Psychiatric: The patient's mood was calm and appropriate for this visit.  The pertinent positives and negatives were described. All other systems were negative.    PMH/PSH:  Past Medical History:    Anxiety state    Back problem    Breast cancer (HCC)    Esophageal obstruction due to food impaction    Fibroadenoma of left breast    Hx of motion sickness    PONV (postoperative nausea and vomiting)    with last  over sedated and o2 sat dropped    Varicose vein    Visual impairment       Past Surgical History:   Procedure Laterality Date      ,,    Extraction erupted tooth/exr      wisdom teeth    Insert intrauterine device      Mirena insertion    Mastectomy left  2024    Mastectomy right  2024    Other surgical history Bilateral 2018    RLTCS with bilateral Salpingectomies    Remove intrauterine device      Mirena removal    Skin surgery         Family History Reviewed:  Family History   Problem Relation Age of Onset    Hypertension Father     Diabetes Father     Heart Disorder Father 52        quad bypass    Other (hypothyroid) Father     Breast Cancer Mother 46    Hypertension Mother     Psychiatric Mother         depression     Breast Cancer Maternal Grandmother 63    Other (Cholangiocarinoma) Brother 36       Allergies:     Allergies[1]    Medications:   ALPRAZolam 0.25 MG Oral Tab Take 1 tablet (0.25 mg total) by mouth 2 (two) times daily as needed. 30 tablet 1    Ribociclib Succ, 400 MG Dose, 200 MG Oral Tablet Therapy Pack Take 400 mg by mouth daily. Take 2 tablets daily on day 1 through 21, based upon a 28 day cycle. 42 each 5    letrozole 2.5 MG Oral Tab Take 1 tablet (2.5 mg total) by mouth daily. 90 tablet 3         Vital Signs:      Height: --  Weight: 74.7 kg (164 lb 9.6 oz) (02/24 1455)  BSA (Calculated - sq m): --  Pulse: 70 (02/24 1455)  BP: 142/97 (02/24 1455)  Temp: 97.3 °F (36.3 °C) (02/24 1455)  Do Not Use - Resp Rate: --  SpO2: 99 % (02/24 1455)      Performance Status:  ECOG 0: Fully active, able to carry on all pre-disease performance without restriction     Physical Examination:      Constitutional: Patient is alert and oriented x 3, not in acute distress.  Eyes: Anicteric sclera, pink conjunctiva.  HEENT:  Oropharynx is clear. Neck is supple.  Respiratory: Clear to auscultation and percussion. No rales.  No wheezes.  Cardiovascular: Regular rate and rhythm. No murmurs.  Gastrointestinal: Soft, non tender with good bowel sounds.  Musculoskeletal: No edema. No calf tenderness.  Neurological: Grossly intact without focal motor or sensory deficit.  Skin: No suspicious skin lesion, no rash, no ulceration.  Lymphatics: There is no palpable lymphadenopathy throughout in the cervical, supraclavicular, or axillary regions.  Psychiatric: The patient's mood is calm and appropriate for this visit.         Results reviewed at this visit:     Lab Results   Component Value Date    WBC 5.2 02/24/2025    RBC 4.23 02/24/2025    HGB 14.1 02/24/2025    HCT 39.6 02/24/2025    MCV 93.6 02/24/2025    MCH 33.3 02/24/2025    MCHC 35.6 02/24/2025    RDW 14.3 02/24/2025    .0 02/24/2025     Lab Results   Component Value Date      02/24/2025    K 3.7 02/24/2025     02/24/2025    CO2 28.0 02/24/2025    BUN 11 02/24/2025    CREATSERUM 1.14 (H) 02/24/2025    GLU 98 02/24/2025    CA 9.5 02/24/2025    ALKPHO 86 02/24/2025    ALT 12 02/24/2025    AST 15 02/24/2025    BILT 0.3 02/24/2025    ALB 4.6 02/24/2025    TP 7.3 02/24/2025       Results  LABS  CBC: normal (02/24/2025)  Chemistry: normal (02/24/2025)  Liver enzymes: normal (02/24/2025)  LDH: normal (02/24/2025)    RADIOLOGY  Bone density: normal lumbar spine, normal total hip, normal femoral neck         Assessment & Plan  Left Breast Cancer  Continuing management of left breast cancer. She is on ribociclib 400 mg (3 weeks on, 1 week off), letrozole 2.5 mg daily, and monthly goserelin injections for ovarian function suppression. Completed 4 cycles of Taxotere and cyclophosphamide in July 2024, radiation in September 2024, and bilateral mastectomy in March 2024. Initial diagnosis was a 9 cm invasive lobular cancer with 3 negative sentinel lymph nodes. Switched from tamoxifen to current regimen in August 2024. No new pains or dyspnea reported. Labs show normal CBC, chemistry, liver enzymes, and LDH protein. Bone density is normal.  - Continue ribociclib 400 mg (3 weeks on, 1 week off)  - Continue letrozole 2.5 mg daily  - Administer monthly goserelin injections  - Repeat labs in 3 months    Hot Flashes and Night Sweats  Reports daily hot flashes and night sweats, occasionally disturbing sleep. Manages with occasional Xanax for sleep. Discussed oxybutynin and Effexor. Oxybutynin preferred due to as-needed use and lower side effect profile. Oxybutynin shows 80% benefit in reducing hot flashes at doses of 2.5 mg to 5 mg, while Effexor shows 60% benefit but requires weaning off.  - Consider oxybutynin 2.5 mg to 5 mg at night or twice a day if hot flashes become bothersome  - Contact if she decides to start medication    Vision Changes  Reports worsening vision over the past month to month and  a half. No recent vision exam. Possible post-chemotherapy effects such as changes in tear production.  - Schedule vision exam    General Health Maintenance  Bone density is normal.  - Follow-up bone density scan in two years.           The following individual(s) verbally consented to be recorded using ambient AI listening technology and understand that they can each withdraw their consent to this listening technology at any point by asking the clinician to turn off or pause the recording:    Patient name: Harleen May MD          [1]   Allergies  Allergen Reactions    Other SHORTNESS OF BREATH     Peonies and lemongrass.

## 2025-02-24 NOTE — PROGRESS NOTES
Patient here for f/u and zoladex injections. Paitnet taking Kisqali 400 mg C3D1, 21 days on 7 off. Patient taking letrozole with c/o of night sweats, hot flashes, and minimal joint pain. Patient completed dexa scan on 1/24. Patient denies n/v/d, chest pain, dyspnea, cough or fevers.     Education Record    Learner:  Patient    Disease / Diagnosis: breast cancer     Barriers / Limitations:  None   Comments:    Method:  Discussion   Comments:    General Topics:  Medication, Side effects and symptom management, and Plan of care reviewed   Comments:    Outcome:  Shows understanding   Comments:

## 2025-03-24 ENCOUNTER — OFFICE VISIT (OUTPATIENT)
Age: 50
End: 2025-03-24
Attending: SURGERY
Payer: COMMERCIAL

## 2025-03-24 VITALS
RESPIRATION RATE: 16 BRPM | TEMPERATURE: 98 F | BODY MASS INDEX: 29.76 KG/M2 | WEIGHT: 163.81 LBS | OXYGEN SATURATION: 100 % | SYSTOLIC BLOOD PRESSURE: 139 MMHG | HEART RATE: 60 BPM | HEIGHT: 62.01 IN | DIASTOLIC BLOOD PRESSURE: 92 MMHG

## 2025-03-24 DIAGNOSIS — C50.812 MALIGNANT NEOPLASM OF OVERLAPPING SITES OF LEFT BREAST IN FEMALE, ESTROGEN RECEPTOR POSITIVE (HCC): Primary | ICD-10-CM

## 2025-03-24 DIAGNOSIS — Z17.0 MALIGNANT NEOPLASM OF OVERLAPPING SITES OF LEFT BREAST IN FEMALE, ESTROGEN RECEPTOR POSITIVE (HCC): Primary | ICD-10-CM

## 2025-03-24 NOTE — PROGRESS NOTES
Education Record    Learner:  Patient    Disease / Diagnosis: breast ca    Barriers / Limitations:  None   Comments:    Method:  Discussion   Comments:    General Topics:  Medication, Plan of care reviewed, and Fall risk and prevention   Comments:    Outcome:  Shows understanding   Comments:    Zoladex inj administered per order. Tolerated well. Discharged ambulatory in stable condition.

## 2025-04-21 ENCOUNTER — OFFICE VISIT (OUTPATIENT)
Age: 50
End: 2025-04-21
Attending: INTERNAL MEDICINE
Payer: COMMERCIAL

## 2025-04-21 DIAGNOSIS — Z17.0 MALIGNANT NEOPLASM OF OVERLAPPING SITES OF LEFT BREAST IN FEMALE, ESTROGEN RECEPTOR POSITIVE (HCC): ICD-10-CM

## 2025-04-21 DIAGNOSIS — Z17.0 MALIGNANT NEOPLASM OF OVERLAPPING SITES OF LEFT BREAST IN FEMALE, ESTROGEN RECEPTOR POSITIVE (HCC): Primary | ICD-10-CM

## 2025-04-21 DIAGNOSIS — C50.812 MALIGNANT NEOPLASM OF OVERLAPPING SITES OF LEFT BREAST IN FEMALE, ESTROGEN RECEPTOR POSITIVE (HCC): Primary | ICD-10-CM

## 2025-04-21 DIAGNOSIS — C50.812 MALIGNANT NEOPLASM OF OVERLAPPING SITES OF LEFT BREAST IN FEMALE, ESTROGEN RECEPTOR POSITIVE (HCC): ICD-10-CM

## 2025-04-22 RX ORDER — RIBOCICLIB 200 MG/1
TABLET, FILM COATED ORAL
Qty: 60 EACH | Refills: 0 | Status: SHIPPED | OUTPATIENT
Start: 2025-04-22 | End: 2025-05-16

## 2025-05-16 DIAGNOSIS — C50.812 MALIGNANT NEOPLASM OF OVERLAPPING SITES OF LEFT BREAST IN FEMALE, ESTROGEN RECEPTOR POSITIVE (HCC): ICD-10-CM

## 2025-05-16 DIAGNOSIS — Z17.0 MALIGNANT NEOPLASM OF OVERLAPPING SITES OF LEFT BREAST IN FEMALE, ESTROGEN RECEPTOR POSITIVE (HCC): ICD-10-CM

## 2025-05-16 RX ORDER — RIBOCICLIB 200 MG/1
TABLET, FILM COATED ORAL
Qty: 1 EACH | Refills: 0 | Status: SHIPPED | OUTPATIENT
Start: 2025-05-16 | End: 2025-06-23

## 2025-05-19 ENCOUNTER — OFFICE VISIT (OUTPATIENT)
Age: 50
End: 2025-05-19
Attending: SURGERY
Payer: COMMERCIAL

## 2025-05-19 VITALS
HEART RATE: 95 BPM | HEIGHT: 62.01 IN | OXYGEN SATURATION: 96 % | RESPIRATION RATE: 16 BRPM | DIASTOLIC BLOOD PRESSURE: 81 MMHG | SYSTOLIC BLOOD PRESSURE: 115 MMHG | WEIGHT: 164 LBS | BODY MASS INDEX: 29.8 KG/M2 | TEMPERATURE: 99 F

## 2025-05-19 DIAGNOSIS — C50.812 MALIGNANT NEOPLASM OF OVERLAPPING SITES OF LEFT BREAST IN FEMALE, ESTROGEN RECEPTOR POSITIVE (HCC): Primary | ICD-10-CM

## 2025-05-19 DIAGNOSIS — Z17.0 MALIGNANT NEOPLASM OF OVERLAPPING SITES OF LEFT BREAST IN FEMALE, ESTROGEN RECEPTOR POSITIVE (HCC): ICD-10-CM

## 2025-05-19 DIAGNOSIS — Z17.0 MALIGNANT NEOPLASM OF OVERLAPPING SITES OF LEFT BREAST IN FEMALE, ESTROGEN RECEPTOR POSITIVE (HCC): Primary | ICD-10-CM

## 2025-05-19 DIAGNOSIS — Z79.899 ENCOUNTER FOR MONITORING CARDIOTOXIC DRUG THERAPY: ICD-10-CM

## 2025-05-19 DIAGNOSIS — Z51.81 ENCOUNTER FOR MONITORING CARDIOTOXIC DRUG THERAPY: ICD-10-CM

## 2025-05-19 DIAGNOSIS — C50.812 MALIGNANT NEOPLASM OF OVERLAPPING SITES OF LEFT BREAST IN FEMALE, ESTROGEN RECEPTOR POSITIVE (HCC): ICD-10-CM

## 2025-05-19 LAB
ALBUMIN SERPL-MCNC: 4.8 G/DL (ref 3.2–4.8)
ALBUMIN/GLOB SERPL: 1.9 {RATIO} (ref 1–2)
ALP LIVER SERPL-CCNC: 88 U/L (ref 39–100)
ALT SERPL-CCNC: 15 U/L (ref 10–49)
ANION GAP SERPL CALC-SCNC: 11 MMOL/L (ref 0–18)
AST SERPL-CCNC: 17 U/L (ref ?–34)
BASOPHILS # BLD AUTO: 0.07 X10(3) UL (ref 0–0.2)
BASOPHILS NFR BLD AUTO: 1.7 %
BILIRUB SERPL-MCNC: 0.3 MG/DL (ref 0.3–1.2)
BUN BLD-MCNC: 7 MG/DL (ref 9–23)
CALCIUM BLD-MCNC: 9.7 MG/DL (ref 8.7–10.6)
CHLORIDE SERPL-SCNC: 108 MMOL/L (ref 98–112)
CO2 SERPL-SCNC: 24 MMOL/L (ref 21–32)
CREAT BLD-MCNC: 1.11 MG/DL (ref 0.55–1.02)
EGFRCR SERPLBLD CKD-EPI 2021: 62 ML/MIN/1.73M2 (ref 60–?)
EOSINOPHIL # BLD AUTO: 0.22 X10(3) UL (ref 0–0.7)
EOSINOPHIL NFR BLD AUTO: 5.2 %
ERYTHROCYTE [DISTWIDTH] IN BLOOD BY AUTOMATED COUNT: 12.6 %
GLOBULIN PLAS-MCNC: 2.5 G/DL (ref 2–3.5)
GLUCOSE BLD-MCNC: 97 MG/DL (ref 70–99)
HCT VFR BLD AUTO: 39.5 % (ref 35–48)
HGB BLD-MCNC: 14.2 G/DL (ref 12–16)
IMM GRANULOCYTES # BLD AUTO: 0.02 X10(3) UL (ref 0–1)
IMM GRANULOCYTES NFR BLD: 0.5 %
LDH SERPL L TO P-CCNC: 206 U/L (ref 120–246)
LYMPHOCYTES # BLD AUTO: 1.26 X10(3) UL (ref 1–4)
LYMPHOCYTES NFR BLD AUTO: 29.7 %
MAGNESIUM SERPL-MCNC: 1.8 MG/DL (ref 1.6–2.6)
MCH RBC QN AUTO: 34.6 PG (ref 26–34)
MCHC RBC AUTO-ENTMCNC: 35.9 G/DL (ref 31–37)
MCV RBC AUTO: 96.3 FL (ref 80–100)
MONOCYTES # BLD AUTO: 0.53 X10(3) UL (ref 0.1–1)
MONOCYTES NFR BLD AUTO: 12.5 %
NEUTROPHILS # BLD AUTO: 2.14 X10 (3) UL (ref 1.5–7.7)
NEUTROPHILS # BLD AUTO: 2.14 X10(3) UL (ref 1.5–7.7)
NEUTROPHILS NFR BLD AUTO: 50.4 %
OSMOLALITY SERPL CALC.SUM OF ELEC: 294 MOSM/KG (ref 275–295)
PHOSPHATE SERPL-MCNC: 3 MG/DL (ref 2.4–5.1)
PLATELET # BLD AUTO: 261 10(3)UL (ref 150–450)
POTASSIUM SERPL-SCNC: 3.6 MMOL/L (ref 3.5–5.1)
PROT SERPL-MCNC: 7.3 G/DL (ref 5.7–8.2)
RBC # BLD AUTO: 4.1 X10(6)UL (ref 3.8–5.3)
SODIUM SERPL-SCNC: 143 MMOL/L (ref 136–145)
WBC # BLD AUTO: 4.2 X10(3) UL (ref 4–11)

## 2025-05-19 NOTE — PROGRESS NOTES
Cancer Center Progress Note    Problem List:      Problem List[1]      History of Present Illness  Harleen Euceda is a 49 year old female with left breast cancer who presents for continued management.    She is currently undergoing treatment for left breast cancer with ribociclib, letrozole, and monthly goserelin injections. Her recent laboratory results, including CBC, electrolytes, kidney function, and liver enzymes, are within normal limits, indicating good tolerance to the treatment regimen.    She experiences a 'weird ache' on the right side, described as an internal sensation rather than muscular. This ache has been more noticeable over the past week, radiating to the collarbone and down the arm. It is not associated with any specific activity and occurs more frequently at rest. No back pain, hip pain, or leg pain is present.    She has a history of a left breast hematoma following her initial mastectomy a year ago. She has not undergone any further plastic surgery procedures since then but has a planned fat transfer procedure from the abdomen to the breast on October 1st.      Review of Systems:   Constitutional: Negative for anorexia, fatigue, fevers, chills, night sweats and weight loss.  Eyes: Negative for visual disturbance, irritation and redness.  Respiratory: Negative for cough, hemoptysis, chest pain, or dyspnea.  Cardiovascular: Negative for angina, orthopnea or palpitations.  Gastrointestinal: Negative for nausea, vomiting, change in bowel habits, diarrhea, constipation and abdominal pain.  Integument/breast: Negative for rash, skin lesions, and pruritus.  Hematologic/lymphatic: Negative for easy bruising, bleeding, and lymphadenopathy.  Genitourinary: Negative for dysuria or hematuria  Neurological: Negative for headaches, dizziness, speech problems, gait problems and focal weakness.  Psychiatric: The patient's mood was calm and appropriate for this visit.  The pertinent positives and negatives  were described. All other systems were negative.    PMH/PSH:  Past Medical History[2]    Past Surgical History[3]    Family History Reviewed:  Family History[4]    Allergies:     Allergies[5]    Medications:  Current Medications[6]       Vital Signs:      Height: 157.5 cm (5' 2.01\") (05/19 1504)  Weight: 74.4 kg (164 lb) (05/19 1504)  BSA (Calculated - sq m): 1.76 sq meters (05/19 1504)  Pulse: 95 (05/19 1504)  BP: 115/81 (05/19 1504)  Temp: 98.7 °F (37.1 °C) (05/19 1504)  Do Not Use - Resp Rate: --  SpO2: 96 % (05/19 1504)      Performance Status:  ECOG 0: Fully active, able to carry on all pre-disease performance without restriction     Physical Examination:      Constitutional: Patient is alert and oriented x 3, not in acute distress.  Eyes: Anicteric sclera, pink conjunctiva.  HEENT:  Oropharynx is clear. Neck is supple.  Respiratory: Clear to auscultation and percussion. No rales.  No wheezes.  Cardiovascular: Regular rate and rhythm. No murmurs.  Gastrointestinal: Soft, non tender with good bowel sounds.  Musculoskeletal: No edema. No calf tenderness.  Neurological: Grossly intact without focal motor or sensory deficit.  Skin: No suspicious skin lesion, no rash, no ulceration.  Lymphatics: There is no palpable lymphadenopathy throughout in the cervical, supraclavicular, or axillary regions.  Psychiatric: The patient's mood is calm and appropriate for this visit.         Results reviewed at this visit:     Lab Results   Component Value Date    WBC 4.2 05/19/2025    RBC 4.10 05/19/2025    HGB 14.2 05/19/2025    HCT 39.5 05/19/2025    MCV 96.3 05/19/2025    MCH 34.6 (H) 05/19/2025    MCHC 35.9 05/19/2025    RDW 12.6 05/19/2025    .0 05/19/2025     Lab Results   Component Value Date     05/19/2025    K 3.6 05/19/2025     05/19/2025    CO2 24.0 05/19/2025    BUN 7 (L) 05/19/2025    CREATSERUM 1.11 (H) 05/19/2025    GLU 97 05/19/2025    CA 9.7 05/19/2025    ALKPHO 88 05/19/2025    ALT 15  05/19/2025    AST 17 05/19/2025    BILT 0.3 05/19/2025    ALB 4.8 05/19/2025    TP 7.3 05/19/2025       Results  LABS  CBC: WBC normal, Hb 14.2, , Neutrophils 2.14 (05/19/2025)  Creatinine: upper limits of normal (05/19/2025)  Liver enzymes: normal (05/19/2025)  LDH: normal (05/19/2025)  Total protein: normal (05/19/2025)  Magnesium: normal (05/19/2025)  Phosphorus: normal (05/19/2025)         Assessment & Plan  Invasive lobular carcinoma of the overlapping quadrants of the left breast:  Clincial T3 disease with 11 cm of MRI abnormality  Clinical N0 disease  Grade II  %  %  Ki-67 12%  Her2 2+ IHC  Her2 not amplied FISH  Bilateral Mastectomy on 3/20/2024:  Right breast benign  Left breast with 9 cm of ILC  SLN 0/3  TC x 4 completed in 6/2024  Started tamoxifen in 8/2024 then switched to letrozole    Continued management with ribociclib, letrozole, and monthly goserelin injections. She tolerates the medications well with no significant side effects. Blood counts and liver enzymes are within normal limits, indicating good tolerance to ribociclib. Reports a new right-sided ache, atypical for cancer recurrence and not concerning at this time. No evidence of recurrence or metastasis based on current symptoms and examination. Plan to pause ribociclib around the time of the planned surgery on October 1st, depending on blood counts and recovery. Encouraged use of the right arm and range of motion exercises to prevent shoulder stiffness.  - Continue ribociclib, letrozole, and monthly goserelin injections.  - Encourage use of right arm and range of motion exercises to prevent shoulder stiffness.  - Plan to pause ribociclib around the time of the planned surgery on October 1st, depending on blood counts and recovery.  - Schedule follow-up appointment in three months.           The following individual(s) verbally consented to be recorded using ambient AI listening technology and understand that they can each  withdraw their consent to this listening technology at any point by asking the clinician to turn off or pause the recording:    Patient name: Harleen Euceda        Deejay May MD          [1]   Patient Active Problem List  Diagnosis    Fibroadenoma of left breast    Varicose vein    Arthritis    Malignant neoplasm of upper-outer quadrant of left breast in female, estrogen receptor positive (HCC)    Esophageal obstruction due to food impaction    Hematoma of left breast    Absence of breast, bilateral    Malignant neoplasm of overlapping sites of left breast in female, estrogen receptor positive (HCC)   [2]   Past Medical History:   Anxiety state    Back problem    Breast cancer (HCC)    Esophageal obstruction due to food impaction    Fibroadenoma of left breast    Hx of motion sickness    PONV (postoperative nausea and vomiting)    with last  over sedated and o2 sat dropped    Varicose vein    Visual impairment   [3]   Past Surgical History:  Procedure Laterality Date      ,,    Extraction, erupted tooth or exposed root      wisdom teeth    Insert intrauterine device      Mirena insertion    Mastectomy left  2024    Mastectomy right  2024    Other surgical history Bilateral 2018    RLTCS with bilateral Salpingectomies    Remove intrauterine device      Mirena removal    Skin surgery     [4]   Family History  Problem Relation Age of Onset    Hypertension Father     Diabetes Father     Heart Disorder Father 52        quad bypass    Other (hypothyroid) Father     Breast Cancer Mother 46    Hypertension Mother     Psychiatric Mother         depression    Breast Cancer Maternal Grandmother 63    Other (Cholangiocarinoma) Brother 36   [5]   Allergies  Allergen Reactions    Other SHORTNESS OF BREATH     Peonies and lemongrass.     [6]    Ribociclib Succ, 400 MG Dose, (KISQALI, 400 MG DOSE,) 200 MG Oral Tablet Therapy Pack TAKE 2 TABLETS DAILY ON DAYS 1 THROUGH 21,  BASED ON A 28 DAY CYCLE 1 each 0    ALPRAZolam 0.25 MG Oral Tab Take 1 tablet (0.25 mg total) by mouth 2 (two) times daily as needed. 30 tablet 1    letrozole 2.5 MG Oral Tab Take 1 tablet (2.5 mg total) by mouth daily. 90 tablet 3

## 2025-05-19 NOTE — PROGRESS NOTES
Patient here for f/u and zoladex injection. Patient taking letrozole as directed with c/o mild hot flashes, night sweats and joint pain. Patient states they side effects are manageable at home. Patient taking Kisqali 400 mg as directed. Patient states that she has experienced some right shoulder pain that she rates a 4-10 that started 2 weeks ago. Patient denies injury to area. Patient has no further concerns or complaints at this time.     Education Record    Learner:  Patient    Disease / Diagnosis: breast cancer      Barriers / Limitations:  None   Comments:    Method:  Discussion   Comments:    General Topics:  Medication, Side effects and symptom management, and Plan of care reviewed   Comments:    Outcome:  Shows understanding   Comments:

## 2025-06-16 ENCOUNTER — OFFICE VISIT (OUTPATIENT)
Age: 50
End: 2025-06-16
Attending: INTERNAL MEDICINE
Payer: COMMERCIAL

## 2025-06-16 DIAGNOSIS — Z17.0 MALIGNANT NEOPLASM OF OVERLAPPING SITES OF LEFT BREAST IN FEMALE, ESTROGEN RECEPTOR POSITIVE (HCC): Primary | ICD-10-CM

## 2025-06-16 DIAGNOSIS — C50.812 MALIGNANT NEOPLASM OF OVERLAPPING SITES OF LEFT BREAST IN FEMALE, ESTROGEN RECEPTOR POSITIVE (HCC): Primary | ICD-10-CM

## 2025-06-16 NOTE — PROGRESS NOTES
Education Record    Learner:  Patient    Disease / Diagnosis: breast ca    Barriers / Limitations:  None   Comments:    Method:  Discussion   Comments:    General Topics:  Medication, Side effects and symptom management, Plan of care reviewed, and Fall risk and prevention   Comments:    Outcome:  Shows understanding   Comments:    Zoladex inj administered per order. Tolerated well. Pt discharged ambulatory in stable condition.

## 2025-07-14 ENCOUNTER — OFFICE VISIT (OUTPATIENT)
Age: 50
End: 2025-07-14
Attending: INTERNAL MEDICINE
Payer: COMMERCIAL

## 2025-07-14 VITALS
RESPIRATION RATE: 16 BRPM | TEMPERATURE: 98 F | DIASTOLIC BLOOD PRESSURE: 79 MMHG | HEART RATE: 76 BPM | OXYGEN SATURATION: 100 % | SYSTOLIC BLOOD PRESSURE: 119 MMHG

## 2025-07-14 DIAGNOSIS — C50.812 MALIGNANT NEOPLASM OF OVERLAPPING SITES OF LEFT BREAST IN FEMALE, ESTROGEN RECEPTOR POSITIVE (HCC): Primary | ICD-10-CM

## 2025-07-14 DIAGNOSIS — Z17.0 MALIGNANT NEOPLASM OF OVERLAPPING SITES OF LEFT BREAST IN FEMALE, ESTROGEN RECEPTOR POSITIVE (HCC): Primary | ICD-10-CM

## 2025-07-28 ENCOUNTER — OFFICE VISIT (OUTPATIENT)
Facility: CLINIC | Age: 50
End: 2025-07-28
Payer: COMMERCIAL

## 2025-07-28 VITALS
BODY MASS INDEX: 31.25 KG/M2 | WEIGHT: 169.81 LBS | HEIGHT: 62 IN | SYSTOLIC BLOOD PRESSURE: 130 MMHG | DIASTOLIC BLOOD PRESSURE: 82 MMHG

## 2025-07-28 DIAGNOSIS — Z17.0 ESTROGEN RECEPTOR POSITIVE NEOPLASM: ICD-10-CM

## 2025-07-28 DIAGNOSIS — Z01.419 ENCOUNTER FOR WELL WOMAN EXAM WITH ROUTINE GYNECOLOGICAL EXAM: ICD-10-CM

## 2025-07-28 DIAGNOSIS — D49.9 ESTROGEN RECEPTOR POSITIVE NEOPLASM: ICD-10-CM

## 2025-07-28 DIAGNOSIS — Z00.00 ANNUAL PHYSICAL EXAM: ICD-10-CM

## 2025-07-28 DIAGNOSIS — Z11.3 SCREENING FOR STDS (SEXUALLY TRANSMITTED DISEASES): Primary | ICD-10-CM

## 2025-07-28 PROBLEM — Z90.79 STATUS POST BILATERAL SALPINGECTOMY: Status: ACTIVE | Noted: 2025-07-28

## 2025-07-28 PROBLEM — Z98.891 HISTORY OF 3 CESAREAN SECTIONS: Status: ACTIVE | Noted: 2018-01-22

## 2025-07-28 PROCEDURE — 87624 HPV HI-RISK TYP POOLED RSLT: CPT | Performed by: OBSTETRICS & GYNECOLOGY

## 2025-07-28 PROCEDURE — 88175 CYTOPATH C/V AUTO FLUID REDO: CPT | Performed by: OBSTETRICS & GYNECOLOGY

## 2025-07-28 PROCEDURE — 87491 CHLMYD TRACH DNA AMP PROBE: CPT | Performed by: OBSTETRICS & GYNECOLOGY

## 2025-07-28 PROCEDURE — 87591 N.GONORRHOEAE DNA AMP PROB: CPT | Performed by: OBSTETRICS & GYNECOLOGY

## 2025-07-28 NOTE — PROGRESS NOTES
GYN H&P     Genetic questionnaire reviewed with the patient and she will be referred for genetic counseling if the questionnaire had any positive results.    The [facility] intake form was also reviewed regarding contraception, menstrual periods, urinary health, and vaginal / sexual health    The patient was offered a medical chaperone during the visit    2025  1:29 PM    Chief Complaint   Patient presents with    Physical     Would like to discuss oophorectomy, recent diagnosis of breast cancer but has not had period since chemo treatment       HPI: Harleen is a 49 year old  Patient's last menstrual period was 2024.  (contraception: TL done during C/S ) here for her annual gyn exam.      Menses have been absent since the start of her chemotherapy. Denies any pelvic or breast complaints.      Pt diagnosed with estrogen receptor + breast cancer 2023. All genetic testing negative.     S/P Bilateral mastectomy     Currently on Ribociclib, letrozole, and monthly goserelin injections.     Wants recs on if bilateral oophorectomy would be more beneficial vs taking the goserelin injections monthly.    Has breast reconstruction scheduled for 10/2025.    She also has some hot flashes, night sweats daily. Discussed Veozah     Onc wants another Pap    Previous encounters and chart reviewed.               23  Progress Notes  Dale Martinez MD (Physician)  OBSTETRICS & GYNECOLOGY  Harleen Euceda is a 47 year old female  Patient's last menstrual period was 2023 (exact date). (contraception:  TL)      HPI:   Patient presents with:  Problem: Pt is here stating she started spotted  last week and has been since briefly nauseous as well. LMP       Patient comes in complaining of her recent episodes of irregular periods occurring every 4 to 6 weeks.  She also notes some occasional hot flashes and some occasional nausea.  Last menstrual period that she remembers was in July  which was light although she reports the her last period to the nurse is being      We explained the various changes that can occur between 47 and 57 and the treatment options that will exist should her symptoms become more offensive.     We discussed hormone replacement therapy we discussed low-dose birth control pills to try to mitigate some of the symptoms should it be necessary     She has a family history of breast cancer and so we put that in context and feel that if she does need HRT that Duavee would be the most appropriate choice         OB History    Para Term  AB Living   3 3 3   3   SAB IAB Ectopic Multiple Live Births      0 3      # Outcome Date GA Lbr Neville/2nd Weight Sex Type Anes PTL Lv   3 Term 18 37w3d  6 lb 1.5 oz (2.765 kg) F CS-LTranv Spinal N DOV      Birth Comments: Edward- passed hearing and heart      Complications: Group B beta strep +   2 Term 09 39w0d  6 lb 5 oz (2.863 kg) M Caesarean   DOV   1 Term 08 39w0d  6 lb 12 oz (3.062 kg) M Caesarean   DOV      Complications: Arrest of dilatation      Obstetric Comments   Menarche: 15 y/o   1st child at age 32   LMP: 24   OCP use x 20 yrs   Breastfeed: NO          GYN hx:   Menarche: 14  Period Cycle (Days): no period since finishing chemo  Period Duration (Days): 3  Use of Birth Control (if yes, specify type): Bilateral Salpingectomy  Pap Date: 23  Pap Result Notes: 23 neg/neg; 20 NEG/NEG, 16 neg,neg  Follow Up Recommendation: due       Past Medical History[1]  Past Surgical History[2]  Allergies[3]  Medications Ordered Prior to Encounter[4]  Family History[5]  Social History     Socioeconomic History    Marital status:      Spouse name: Not on file    Number of children: 3    Years of education: Not on file    Highest education level: Not on file   Occupational History    Not on file   Tobacco Use    Smoking status: Never    Smokeless tobacco: Never   Vaping Use     Vaping status: Never Used   Substance and Sexual Activity    Alcohol use: Yes     Alcohol/week: 3.0 standard drinks of alcohol     Types: 3 Standard drinks or equivalent per week    Drug use: No    Sexual activity: Yes   Other Topics Concern     Service Not Asked    Blood Transfusions Not Asked    Caffeine Concern Yes     Comment: 3 cups of coffee daily    Occupational Exposure Not Asked    Hobby Hazards Not Asked    Sleep Concern Not Asked    Stress Concern Not Asked    Weight Concern Not Asked    Special Diet Not Asked    Back Care Not Asked    Exercise No    Bike Helmet Not Asked    Seat Belt Not Asked    Self-Exams Not Asked   Social History Narrative    , lives at home with 2 sons and 1 daughter    Works as      Social Drivers of Health     Food Insecurity: No Food Insecurity (2025)    NCSS - Food Insecurity     Worried About Running Out of Food in the Last Year: No     Ran Out of Food in the Last Year: No   Transportation Needs: No Transportation Needs (2025)    NCSS - Transportation     Lack of Transportation: No   Stress: No Stress Concern Present (2024)    Stress     Feeling of Stress : No   Housing Stability: Not At Risk (2025)    NCSS - Housing/Utilities     Has Housing: Yes     Worried About Losing Housing: No     Unable to Get Utilities: No       ROS:     Review of Systems:  General: denies fevers, chills, fatigue and malaise.   Eyes: no visual changes, denies headaches  ENT: no complaints, denies earaches, runny nose, epistaxis, throat pain or sore throat  Respiratory: denies SOB, dyspnea, cough or wheezing  Cardiovascular: denies chest pain, palpitations, exercise intolerance   GI: denies abdominal pain, diarrhea, constipation  : no complaints, denies dysuria, increased urinary frequency. Menses absent since chemo, no dysmenorrhea, no menorrhagia, no dyspareunia   Hematological/lymphatic: denies history of excessive bleeding or  bruising, denies dizziness, lightheadedness.   Breast: denies rashes, skin changes, pain, lumps or discharge   Psychiatric: denies depression, changes in sleep patterns, anxiety  Endocrine: denies hot or cold intolerance, mood changes   Neurological: denies changes in sight, smell, hearing or taste. Denies seizures or tremors  Immunological: denies allergies, denies anaphylaxis, or swollen lymph nodes  Musculoskeletal: denies joint pain, morning stiffness, decreased range of motion         O /82   Ht 62\"   Wt 169 lb 12.8 oz (77 kg)   LMP 06/09/2024   BMI 31.06 kg/m²         Wt Readings from Last 6 Encounters:   07/28/25 169 lb 12.8 oz (77 kg)   05/19/25 164 lb (74.4 kg)   03/24/25 163 lb 12.8 oz (74.3 kg)   02/24/25 164 lb 9.6 oz (74.7 kg)   02/10/25 161 lb 3.2 oz (73.1 kg)   01/27/25 166 lb (75.3 kg)     Exam:   GENERAL: well developed, well nourished, in no apparent distress, oriented.  SKIN: no rashes, no suspicious lesions  HEENT: normal  NECK: supple; no thyromegaly, no adenopathy  LUNGS: clear to auscultation  CARDIOVASCULAR: normal S1, S2, RRR  BREASTS: bilateral mastectomies   ABDOMEN: pfannenstiel scar well healed,  soft, non distended; non tender, no masses  PELVIC: External Genitalia: Normal appearing, no lesions.    Vagina: normal pink mucosa, no lesions, normal clear discharge.    Bladder well supported.  No  anterior or posterior hernias    Cervix: nulliparous, no lesions , No CMT     Uterus: AVAF, mobile, non tender, normal size    Adnexa: non tender, no masses, normal size    Rectal: deferred  EXTREMITIES:  non tender without edema             Patient counseled on:    Diet/exercise.      Self Breast Exams     Safe sex practices / and living environment     Vaccines:  Annual Flu, Tdap +/- Gardasil not recommended (up to 45 yrs).      Pneumococcal at 65 yrs old, Shingles at 60 yrs old          Pap: neg/neg - Year:  2023, repeated today per oncologist  GC/Chlamydia:  ordered  Mammogram:  na    Dexa:  na  Colonoscopy: stressed   Lipid / Cholesterol:    Lipid Panel [[id number]] (Abnormal)  Resulted: 11/04/24 1355, Result status: Final result  Ordering provider: Deejay May MD  11/04/24 1301 Resulting lab: [facility] ([facility])     Specimen Information    ID Type Source Collected On   [id number] Blood -- 11/04/24 1302     Components    Component Value Reference Range Flag   Cholesterol, Total 165 <200 mg/dL --   Comment: Desirable  <200 mg/dL  Borderline  200-239 mg/dL  High      >=240 mg/dL     HDL Cholesterol 60 40 - 59 mg/dL H High    Comment:      Interpretive Information:  An HDL cholesterol <40 mg/dL is low and constitutes a coronary heart disease risk factor. An HDL cholesterol >60 mg/dL is a negative risk factor for coronary heart disease.     Triglycerides 152 30 - 149 mg/dL H High    Comment:      Reference interval for fasting triglycerides  Desirable: <150 mg/dL  Borderline: 150-199 mg/dL  High: 200-499 mg/dL  Very High: >=500 mg/dL       LDL Cholesterol 79 <100 mg/dL --   Comment:      Optimal            <100 mg/dL  Near/Above OptimaL 100-129 mg/dL  Borderline High    130-159 mg/dL  High               160-189 mg/dL    Very High          >190 mg/dL     VLDL 24 0 - 30 mg/dL --   Non HDL Chol 105 <130 mg/dL --   Comment:      Desirable  <130 mg/dL  Borderline  130-159 mg/dL  High        160-189 mg/dL      Very high >=190 mg/dL     Patient Fasting for Lipid? Patient not present --         A/P: Patient is 49 year old female with no complaints. Here for well woman exam.     Meds This Visit:    Requested Prescriptions      No prescriptions requested or ordered in this encounter       1. Screening for STDs (sexually transmitted diseases)  - Chlamydia/Gc Amplification; Future  - Chlamydia/Gc Amplification    2. Encounter for well woman exam with routine gynecological exam  - ThinPrep PAP Smear B; Future  - Hpv High Risk , Thin Prep Collect; Future  - ThinPrep PAP Smear B  -  Hpv High Risk , Thin Prep Collect    3. Annual physical exam    4. Estrogen receptor positive neoplasm    Patient wants to stop monthly shots and have ovaries removed.  Discussed the risks and complications and answered all questions.       Return in about 6 weeks (around 2025) for Post. Op..    Simba Martinez MD   2025  1:29 PM       This note was created by [employer] voice recognition. Errors in content may be related to improper recognition by the system; efforts to review and correct have been done but errors may still exist. Please contact me with any questions.    Note to patient and family   The  Cent[employer] Cures Act makes medical notes available to patients in the interest of transparency.  However, please be advised that this is a medical document.  It is intended as fcts-nd-dbbu communication.  It is written in medical language which may contain abbreviations or verbiage that are technical and unfamiliar.  It may appear blunt or direct.  Medical documents are intended to carry relevant information, facts as evident, and the clinical opinion of the practitioner.           [1]   Past Medical History:   Abnormal uterine bleeding    Allergic rhinitis    Anxiety    Anxiety state    Arthritis    Back problem    Breast cancer (HCC)    Esophageal obstruction due to food impaction    Fibroadenoma of left breast    Hx of motion sickness    Lobular carcinoma in situ of breast    PONV (postoperative nausea and vomiting)    with last  over sedated and o2 sat dropped    Varicose vein    Visual impairment   [2]   Past Surgical History:  Procedure Laterality Date    Breast biopsy  23      ,,    Extraction, erupted tooth or exposed root      wisdom teeth    Insert intrauterine device      Mirena insertion    Mastectomy left  2024    Mastectomy right  2024    Needle biopsy left  23    Other surgical history Bilateral 2018     RLTCS with bilateral Salpingectomies    Remove intrauterine device  2015    Mirena removal    Skin surgery      Tubal ligation  1/22/2018 1/22/2018   [3]   Allergies  Allergen Reactions    Other SHORTNESS OF BREATH     Peonies and lemongrass.     [4]   Current Outpatient Medications on File Prior to Visit   Medication Sig Dispense Refill    Ribociclib Succ, 400 MG Dose, (KISQALI, 400 MG DOSE,) 200 MG Oral Tablet Therapy Pack TAKE 2 TABLETS DAILY ON DAYS 1 THROUGH 21, BASED ON A 28 DAY CYCLE 42 each 0    ALPRAZolam 0.25 MG Oral Tab Take 1 tablet (0.25 mg total) by mouth 2 (two) times daily as needed. 30 tablet 1    letrozole 2.5 MG Oral Tab Take 1 tablet (2.5 mg total) by mouth daily. 90 tablet 3     No current facility-administered medications on file prior to visit.   [5]   Family History  Problem Relation Age of Onset    Hypertension Father     Diabetes Father     Heart Disorder Father 52        quad bypass    Other (hypothyroid) Father     Obesity Father     Heart Attack Father     Breast Cancer Mother 46    Hypertension Mother     Psychiatric Mother         depression    Cancer Mother     Breast Cancer Maternal Grandmother 63    Cancer Maternal Grandmother     Other (Cholangiocarinoma) Brother 36    Cancer Brother

## 2025-07-29 LAB
C TRACH DNA SPEC QL NAA+PROBE: NEGATIVE
HPV E6+E7 MRNA CVX QL NAA+PROBE: POSITIVE
N GONORRHOEA DNA SPEC QL NAA+PROBE: NEGATIVE

## 2025-07-30 LAB — LAST PAP RESULT: NORMAL

## 2025-08-11 ENCOUNTER — NURSE ONLY (OUTPATIENT)
Facility: LOCATION | Age: 50
End: 2025-08-11
Attending: INTERNAL MEDICINE

## 2025-08-11 ENCOUNTER — OFFICE VISIT (OUTPATIENT)
Facility: LOCATION | Age: 50
End: 2025-08-11
Attending: INTERNAL MEDICINE

## 2025-08-11 VITALS
SYSTOLIC BLOOD PRESSURE: 132 MMHG | DIASTOLIC BLOOD PRESSURE: 89 MMHG | HEIGHT: 62.01 IN | BODY MASS INDEX: 30.78 KG/M2 | RESPIRATION RATE: 16 BRPM | HEART RATE: 93 BPM | OXYGEN SATURATION: 95 % | WEIGHT: 169.38 LBS | TEMPERATURE: 98 F

## 2025-08-11 DIAGNOSIS — Z17.0 MALIGNANT NEOPLASM OF OVERLAPPING SITES OF LEFT BREAST IN FEMALE, ESTROGEN RECEPTOR POSITIVE (HCC): ICD-10-CM

## 2025-08-11 DIAGNOSIS — C50.812 MALIGNANT NEOPLASM OF OVERLAPPING SITES OF LEFT BREAST IN FEMALE, ESTROGEN RECEPTOR POSITIVE (HCC): ICD-10-CM

## 2025-08-11 DIAGNOSIS — Z79.899 ENCOUNTER FOR MONITORING CARDIOTOXIC DRUG THERAPY: ICD-10-CM

## 2025-08-11 DIAGNOSIS — Z51.81 ENCOUNTER FOR MONITORING CARDIOTOXIC DRUG THERAPY: ICD-10-CM

## 2025-08-11 DIAGNOSIS — C50.812 MALIGNANT NEOPLASM OF OVERLAPPING SITES OF LEFT BREAST IN FEMALE, ESTROGEN RECEPTOR POSITIVE (HCC): Primary | ICD-10-CM

## 2025-08-11 DIAGNOSIS — Z17.0 MALIGNANT NEOPLASM OF OVERLAPPING SITES OF LEFT BREAST IN FEMALE, ESTROGEN RECEPTOR POSITIVE (HCC): Primary | ICD-10-CM

## 2025-08-11 LAB
ALBUMIN SERPL-MCNC: 4.6 G/DL (ref 3.2–4.8)
ALBUMIN/GLOB SERPL: 1.7 (ref 1–2)
ALP LIVER SERPL-CCNC: 97 U/L (ref 39–100)
ALT SERPL-CCNC: 10 U/L (ref 10–49)
ANION GAP SERPL CALC-SCNC: 13 MMOL/L (ref 0–18)
AST SERPL-CCNC: 19 U/L (ref ?–34)
BASOPHILS # BLD AUTO: 0.06 X10(3) UL (ref 0–0.2)
BASOPHILS NFR BLD AUTO: 1.3 %
BILIRUB SERPL-MCNC: 0.4 MG/DL (ref 0.3–1.2)
BUN BLD-MCNC: 11 MG/DL (ref 9–23)
CALCIUM BLD-MCNC: 9.7 MG/DL (ref 8.7–10.6)
CHLORIDE SERPL-SCNC: 106 MMOL/L (ref 98–112)
CO2 SERPL-SCNC: 22 MMOL/L (ref 21–32)
CREAT BLD-MCNC: 1.16 MG/DL (ref 0.55–1.02)
EGFRCR SERPLBLD CKD-EPI 2021: 59 ML/MIN/1.73M2 (ref 60–?)
EOSINOPHIL # BLD AUTO: 0 X10(3) UL (ref 0–0.7)
EOSINOPHIL NFR BLD AUTO: 0 %
ERYTHROCYTE [DISTWIDTH] IN BLOOD BY AUTOMATED COUNT: 12.6 %
GLOBULIN PLAS-MCNC: 2.7 G/DL (ref 2–3.5)
GLUCOSE BLD-MCNC: 102 MG/DL (ref 70–99)
HCT VFR BLD AUTO: 38.4 % (ref 35–48)
HGB BLD-MCNC: 14.1 G/DL (ref 12–16)
IMM GRANULOCYTES # BLD AUTO: 0.02 X10(3) UL (ref 0–1)
IMM GRANULOCYTES NFR BLD: 0.4 %
LDH SERPL L TO P-CCNC: 186 U/L (ref 120–246)
LYMPHOCYTES # BLD AUTO: 1.44 X10(3) UL (ref 1–4)
LYMPHOCYTES NFR BLD AUTO: 30.1 %
MAGNESIUM SERPL-MCNC: 1.8 MG/DL (ref 1.6–2.6)
MCH RBC QN AUTO: 34.6 PG (ref 26–34)
MCHC RBC AUTO-ENTMCNC: 36.7 G/DL (ref 31–37)
MCV RBC AUTO: 94.1 FL (ref 80–100)
MONOCYTES # BLD AUTO: 0.64 X10(3) UL (ref 0.1–1)
MONOCYTES NFR BLD AUTO: 13.4 %
NEUTROPHILS # BLD AUTO: 2.63 X10 (3) UL (ref 1.5–7.7)
NEUTROPHILS # BLD AUTO: 2.63 X10(3) UL (ref 1.5–7.7)
NEUTROPHILS NFR BLD AUTO: 54.8 %
OSMOLALITY SERPL CALC.SUM OF ELEC: 292 MOSM/KG (ref 275–295)
PHOSPHATE SERPL-MCNC: 3.6 MG/DL (ref 2.4–5.1)
PLATELET # BLD AUTO: 272 10(3)UL (ref 150–450)
POTASSIUM SERPL-SCNC: 3.8 MMOL/L (ref 3.5–5.1)
PROT SERPL-MCNC: 7.3 G/DL (ref 5.7–8.2)
RBC # BLD AUTO: 4.08 X10(6)UL (ref 3.8–5.3)
SODIUM SERPL-SCNC: 141 MMOL/L (ref 136–145)
WBC # BLD AUTO: 4.8 X10(3) UL (ref 4–11)

## (undated) DEVICE — SOLUTION PREP 4OZ 10% POVIDONE IOD SCR TOP

## (undated) DEVICE — DRAPE,TOWEL,LARGE,INVISISHIELD: Brand: MEDLINE

## (undated) DEVICE — 3M™ IOBAN™ 2 ANTIMICROBIAL INCISE DRAPE 6648EZ: Brand: IOBAN™ 2

## (undated) DEVICE — Device

## (undated) DEVICE — GLOVE SUR 6.5 SENSICARE PI PIP CRM PWD F

## (undated) DEVICE — ZZ-DISC-SUB-301304-SUT ETHLN 3-0 18IN FS-1 NABSRB BLK 24MM 3/8 C

## (undated) DEVICE — AEGIS 1" DISK 4MM HOLE, PEEL OPEN: Brand: MEDLINE

## (undated) DEVICE — SUTURE MCRYL SZ 4-0 L27IN ABSRB UD L19MM PS-2

## (undated) DEVICE — EVACUATOR SUR 100CC SIL BLB WND

## (undated) DEVICE — ANTIBACTERIAL UNDYED BRAIDED (POLYGLACTIN 910), SYNTHETIC ABSORBABLE SUTURE: Brand: COATED VICRYL

## (undated) DEVICE — BREAST-HERNIA-PORT CDS-LF: Brand: MEDLINE INDUSTRIES, INC.

## (undated) DEVICE — CANNULA NSL AD W/ FLTR 14FT O2/CO2

## (undated) DEVICE — SUT PDS II 2-0 27IN SH ABSRB VLT L26MM 1/2

## (undated) DEVICE — E-Z CLEAN, NON-STICK, PTFE COATED, ELECTROSURGICAL BLADE ELECTRODE, MODIFIED EXTENDED INSULATION, 4 INCH (10.2 CM): Brand: MEGADYNE

## (undated) DEVICE — SUT MCRYL 4-0 18IN PS-2 ABSRB UD 19MM 3/8 CIR

## (undated) DEVICE — 4-WAY HIGH FLOW STOPCOCK W/ROTATING LUER: Brand: ICU MEDICAL

## (undated) DEVICE — BITEBLOCK ENDOSCP 60FR MAXI STRP

## (undated) DEVICE — PROXIMATE SKIN STAPLERS (35 WIDE) CONTAINS 35 STAINLESS STEEL STAPLES (FIXED HEAD): Brand: PROXIMATE

## (undated) DEVICE — PROVE COVER: Brand: UNBRANDED

## (undated) DEVICE — ELECTRODE MONIT RED DOT 3PK

## (undated) DEVICE — GLOVE SUR 7.5 SENSICARE PI PIP CRM PWD F

## (undated) DEVICE — SUT ETHBND XL 0 30IN CT-1 NABSRB GRN 36MM 1/2

## (undated) DEVICE — VIOLET BRAIDED (POLYGLACTIN 910), SYNTHETIC ABSORBABLE SUTURE: Brand: COATED VICRYL

## (undated) DEVICE — KIT CUSTOM ENDOPROCEDURE STERIS

## (undated) DEVICE — SLEEVE COMPR MD KNEE LEN SGL USE KENDALL SCD

## (undated) DEVICE — SYRINGE MED 50ML LL TIP DISP

## (undated) DEVICE — CANISTER SUCT 1200CC LID BLU HRD ADPT AUTO

## (undated) DEVICE — GLOVE,SURG,SENSICARE SLT,LF,PF,7.5: Brand: MEDLINE

## (undated) DEVICE — KIT VLV 5 PC AIR H2O SUCT BX ENDOGATOR CONN

## (undated) DEVICE — MARKER SKIN PREP-RESISTANT ST: Brand: MEDLINE INDUSTRIES, INC.

## (undated) DEVICE — ELECTRODE ES 2.75IN PTFE BLDE MOD E-Z CLN

## (undated) DEVICE — 3M™ IOBAN™ 2 ANTIMICROBIAL INCISE DRAPE 6651EZ: Brand: IOBAN™ 2

## (undated) DEVICE — ZZ-DISC-SUB-405166-SUT COAT VCRL 3-0 27IN SH ABSRB UD 26MM 1/2

## (undated) DEVICE — SOLUTION IRRIG 1000ML 0.9% NACL USP BTL

## (undated) DEVICE — 3M™ STERI-STRIP™ REINFORCED ADHESIVE SKIN CLOSURES, R1548, 1 IN X 5 IN (25 MM X 125 MM), 4 STRIPS/ENVELOPE: Brand: 3M™ STERI-STRIP™

## (undated) DEVICE — COVER,LIGHT,CAMERA,HARD,1/PK,STRL: Brand: MEDLINE

## (undated) DEVICE — SUT PROL 2-0 30IN SH NABSRB BLU L26MM 1/2 CIR

## (undated) DEVICE — GLOVE SUR 7 SENSICARE PI PIP CRM PWD F

## (undated) DEVICE — BANDAGE,GAUZE,BULKEE II,4.5"X4.1YD,STRL: Brand: MEDLINE

## (undated) DEVICE — 40580 - THE PINK PAD - ADVANCED TRENDELENBURG POSITIONING KIT: Brand: 40580 - THE PINK PAD - ADVANCED TRENDELENBURG POSITIONING KIT

## (undated) DEVICE — 3M™ TEGADERM™ TRANSPARENT FILM DRESSING FRAME STYLE, 1624W, 2-3/8 IN X 2-3/4 IN (6 CM X 7 CM), 100/CT 4CT/CASE: Brand: 3M™ TEGADERM™

## (undated) DEVICE — LAPAROTOMY SPONGE - RF AND X-RAY DETECTABLE PRE-WASHED: Brand: SITUATE

## (undated) DEVICE — SLEEVE COMPR M KNEE LEN SGL USE KENDALL SCD

## (undated) DEVICE — LIGACLIP MCA MULTIPLE CLIP APPLIERS, 30 MEDIUM CLIPS: Brand: LIGACLIP

## (undated) DEVICE — GAUZE,SPONGE,FLUFF,6"X6.75",STRL,5/TRAY: Brand: MEDLINE

## (undated) DEVICE — DRAIN SUR 15FR L3/16IN DIA4.7MM SIL RND

## (undated) DEVICE — INSORB SKIN STAPLER

## (undated) DEVICE — PACK CDS PLASTIC BREAST

## (undated) DEVICE — SYRINGE MED 10ML LL CTRL W/ FNGR GRP CLR BRL

## (undated) DEVICE — TRAY CATH 16FR F INCL BARDX IC COMPLT CARE

## (undated) DEVICE — SUT PERMA- 0 18IN FSL NABSRB BLK L30MM 3/8

## (undated) DEVICE — ADHESIVE SKIN TOP FOR WND CLSR DERMBND ADV

## (undated) DEVICE — INTERCEED

## (undated) NOTE — LETTER
[facility]  Shravanmoe Landerosgiselle 63 [address] [facility], [address] [place] [address]    Consent for Operation    Date: __________________    Time: _______________    1.  I authorize the performance upon Michelle Furnish the following operation:                              Sathish Clemens videotape. The [facility] will not be responsible for storage or maintenance of this tape. 6. For the purpose of advancing medical education, I consent to the admittance of observers to the Operating Room.     7. I authorize the use of any specimen, organs Signature of Patient:   ___________________________    When the patient is a minor or mentally incompetent to give consent:  Signature of person authorized to consent for patient: ___________________________   Relationship to patient: _____________________ · If I am allergic to anything or have had a reaction to anesthesia before. 3. I understand how the anesthesia medicine will help me (benefits). 4. I understand that with any type of anesthesia medicine there are risks:  a.  The most common risks are: their representative has agreed to have anesthesia services.     _____________________________________________________________________________  Witness        Date   Time  I have verified that the signature is that of the patient or patient’s representative

## (undated) NOTE — Clinical Note
Patient needs rad onc consultation for chest wall radiation. She is getting final cycle of TC today.

## (undated) NOTE — Clinical Note
On tamoxifen since late July, misses one dose/week due to erratic work schedule, she is working on improvements. Hot flashes only. Drinks daily, hopes to reduce. Sees you 10/21. thx

## (undated) NOTE — Clinical Note
Johnny, I ordered Ribociclib and sent this to [facility]. The plan will be to start this when she returns for the second Goserelin injection in about four weeks. Just a heads up to look for the insurance approval.

## (undated) NOTE — Clinical Note
I had the pleasure of seeing Harleen Euceda on 1/2/2024.  Please see my attached note.  Caro Nicole MD FACS [facility]--Surgery

## (undated) NOTE — LETTER
To Whom It May Concern:  This certifies that Harleen Euceda was seen in my office today accompanied by her parent. Please excuse ***, her parent from {em school/work:[id number]} today. Do not hesitate to call with any questions or concerns.        Sincerely,    No name on file  [facility]  [address] [place] [id number]  [phone]        Document generated by: Mariajose GONZALEZ RN

## (undated) NOTE — LETTER
04/02/24    To whom it may concern 0     This patient, Harleen Euceda ([birth year]) should refrain from pushing, pulling, or lifting during the work day with either arms for the next eight weeks (through 5/28/24).   Sincerely,   Joseph Proctor DO, FACOS

## (undated) NOTE — LETTER
[facility]  Gem Mazariegos 63 [address] [facility], [address] [place] [address]    Consent for Operation    Date: __________________    Time: _______________    1.  I authorize the performance upon Uli Myers the following operation:                              Sathish Ruiz procedure has been videotaped, the surgeon will obtain the original videotape. The hospital will not be responsible for storage or maintenance of this tape.     6. For the purpose of advancing medical education, I consent to the admittance of observers to t STATEMENTS REQUIRING INSERTION OR COMPLETION WERE FILLED IN.     Signature of Patient:   ___________________________    When the patient is a minor or mentally incompetent to give consent:  Signature of person authorized to consent for patient: ____________ · If I am allergic to anything or have had a reaction to anesthesia before. 3. I understand how the anesthesia medicine will help me (benefits). 4. I understand that with any type of anesthesia medicine there are risks:  a.  The most common risks are: their representative has agreed to have anesthesia services.     _____________________________________________________________________________  Witness        Date   Time  I have verified that the signature is that of the patient or patient’s representative

## (undated) NOTE — LETTER
[address]  [address]  Authorization for Surgical Operation and Procedure     Date:___________                                                                                                         Time:__________  I hereby authorize Surgeon(s):  Larry Salamanca MD, my physician and his/her assistants (if applicable), which may include medical students, residents, and/or fellows, to perform the following surgical operation/ procedure and administer such anesthesia as may be determined necessary by my physician:  Operation/Procedure name (s) Procedure(s):  ESOPHAGOGASTRODUODENOSCOPY (EGD) on Harleen Euceda   2.   I recognize that during the surgical operation/procedure, unforeseen conditions may necessitate additional or different procedures than those listed above.  I, therefore, further authorize and request that the above-named surgeon, assistants, or designees perform such procedures as are, in their judgment, necessary and desirable.    3.   My surgeon/physician has discussed prior to my surgery the potential benefits, risks and side effects of this procedure; the likelihood of achieving goals; and potential problems that might occur during recuperation.  They also discussed reasonable alternatives to the procedure, including risks, benefits, and side effects related to the alternatives and risks related to not receiving this procedure.  I have had all my questions answered and I acknowledge that no guarantee has been made as to the result that may be obtained.    4.   Should the need arise during my operation/procedure, which includes change of level of care prior to discharge, I also consent to the administration of blood and/or blood products.  Further, I understand that despite careful testing and screening of blood or blood products by collecting agencies, I may still be subject to ill effects as a result of receiving a blood transfusion and/or blood products.  The  following are some, but not all, of the potential risks that can occur: fever and allergic reactions, hemolytic reactions, transmission of diseases such as Hepatitis, AIDS and Cytomegalovirus (CMV) and fluid overload.  In the event that I wish to have an autologous transfusion of my own blood, or a directed donor transfusion, I will discuss this with my physician.  Check only if Refusing Blood or Blood Products  I understand refusal of blood or blood products as deemed necessary by my physician may have serious consequences to my condition to include possible death. I hereby assume responsibility for my refusal and release the hospital, its personnel, and my physicians from any responsibility for the consequences of my refusal.          o  Refuse      5.   I authorize the use of any specimen, organs, tissues, body parts or foreign objects that may be removed from my body during the operation/procedure for diagnosis, research or teaching purposes and their subsequent disposal by hospital authorities.  I also authorize the release of specimen test results and/or written reports to my treating physician on the hospital medical staff or other referring or consulting physicians involved in my care, at the discretion of the Pathologist or my treating physician.    6.   I consent to the photographing or videotaping of the operations or procedures to be performed, including appropriate portions of my body for medical, scientific, or educational purposes, provided my identity is not revealed by the pictures or by descriptive texts accompanying them.  If the procedure has been photographed/videotaped, the surgeon will obtain the original picture, image, videotape or CD.  The hospital will not be responsible for storage, release or maintenance of the picture, image, tape or CD.    7.   I consent to the presence of a  or observers in the operating room as deemed necessary by my physician or their designees.     8.   I recognize that in the event my procedure results in extended X-Ray/fluoroscopy time, I may develop a skin reaction.    9. If I have a Do Not Attempt Resuscitation (DNAR) order in place, that status will be suspended while in the operating room, procedural suite, and during the recovery period unless otherwise explicitly stated by me (or a person authorized to consent on my behalf). The surgeon or my attending physician will determine when the applicable recovery period ends for purposes of reinstating the DNAR order.  10. Patients having a sterilization procedure: I understand that if the procedure is successful the results will be permanent and it will therefore be impossible for me to inseminate, conceive, or bear children.  I also understand that the procedure is intended to result in sterility, although the result has not been guaranteed.   11. I acknowledge that my physician has explained sedation/analgesia administration to me including the risk and benefits I consent to the administration of sedation/analgesia as may be necessary or desirable in the judgment of my physician.    I CERTIFY THAT I HAVE READ AND FULLY UNDERSTAND THE ABOVE CONSENT TO OPERATION and/or OTHER PROCEDURE.    _________________________________________  __________________________________  Signature of Patient     Signature of Responsible Person         ___________________________________         Printed Name of Responsible Person           _________________________________                 Relationship to Patient  _________________________________________  ______________________________  Signature of Witness          Date  Time      Patient Name: Harleen Euceda     : 1975                 Printed: 2024     Medical Record #: [id number]                     Page [address] of [address] [place], [place] [address], [place]  [address]    Consent for Anesthesia    I, Harleen Euceda agree  to be cared for by an anesthesiologist, who is specially trained to monitor me and give me medicine to put me to sleep or keep me comfortable during my procedure    I understand that my anesthesiologist is not an employee or agent of [facility] or [employer]. He or she works for [employer].    As the patient asking for anesthesia services, I agree to:  Allow the anesthesiologist (anesthesia doctor) to give me medicine and do additional procedures as necessary. Some examples are: Starting or using an “IV” to give me medicine, fluids or blood during my procedure, and having a breathing tube placed to help me breathe when I’m asleep (intubation). In the event that my heart stops working properly, I understand that my anesthesiologist will make every effort to sustain my life, unless otherwise directed by [facility] Do Not Resuscitate documents.  Tell my anesthesia doctor before my procedure:  If I am pregnant.  The last time that I ate or drank.  All of the medicines I take (including prescriptions, herbal supplements, and pills I can buy without a prescription (including street drugs/illegal medications). Failure to inform my anesthesiologist about these medicines may increase my risk of anesthetic complications.  If I am allergic to anything or have had a reaction to anesthesia before.  I understand how the anesthesia medicine will help me (benefits).  I understand that with any type of anesthesia medicine there are risks:  The most common risks are: nausea, vomiting, sore throat, muscle soreness, damage to my eyes, mouth, or teeth (from breathing tube placement).  Rare risks include: remembering what happened during my procedure, allergic reactions to medications, injury to my airway, heart, lungs, vision, nerves, or muscles and in extremely rare instances death.  My doctor has explained to me other choices available to me for my care (alternatives).  Pregnant Patients  (“epidural”):  I understand that the risks of having an epidural (medicine given into my back to help control pain during labor), include itching, low blood pressure, difficulty urinating, headache or slowing of the baby’s heart. Very rare risks include infection, bleeding, seizure, irregular heart rhythms and nerve injury.  Regional Anesthesia (“spinal”, “epidural”, & “nerve blocks”):  I understand that rare but potential complications include headache, bleeding, infection, seizure, irregular heart rhythms, and nerve injury.    I can change my mind about having anesthesia services at any time before I get the medicine.    _____________________________________________________________________________  Patient (or Representative) Signature/Relationship to Patient  Date   Time    _____________________________________________________________________________   Name (if used)    Language/Organization   Time    _____________________________________________________________________________  Anesthesiologist Signature     Date   Time  I have discussed the procedure and information above with the patient (or patient’s representative) and answered their questions. The patient or their representative has agreed to have anesthesia services.    _____________________________________________________________________________  Witness        Date   Time  I have verified that the signature is that of the patient or patient’s representative, and that it was signed before the procedure  Patient Name: Harleen Euceda     : 1975                 Printed: 2024     Medical Record #: [id number]                     Page 2 of 2

## (undated) NOTE — LETTER
07/12/19        Grecia Castellanos  [facility]      Dear Ben Song,    This is just a reminder that you are due for you Mammogram. There is an order placed in the system for you.  You can schedule this test through Central Scheduling at